# Patient Record
Sex: FEMALE | Race: WHITE | NOT HISPANIC OR LATINO
[De-identification: names, ages, dates, MRNs, and addresses within clinical notes are randomized per-mention and may not be internally consistent; named-entity substitution may affect disease eponyms.]

---

## 2017-01-09 ENCOUNTER — APPOINTMENT (OUTPATIENT)
Dept: DERMATOLOGY | Facility: CLINIC | Age: 32
End: 2017-01-09

## 2017-02-27 ENCOUNTER — APPOINTMENT (OUTPATIENT)
Dept: DERMATOLOGY | Facility: CLINIC | Age: 32
End: 2017-02-27

## 2017-04-24 ENCOUNTER — APPOINTMENT (OUTPATIENT)
Dept: DERMATOLOGY | Facility: CLINIC | Age: 32
End: 2017-04-24

## 2017-04-24 VITALS
DIASTOLIC BLOOD PRESSURE: 78 MMHG | HEIGHT: 60 IN | WEIGHT: 25 LBS | SYSTOLIC BLOOD PRESSURE: 110 MMHG | BODY MASS INDEX: 4.91 KG/M2

## 2017-04-24 DIAGNOSIS — L70.0 ACNE VULGARIS: ICD-10-CM

## 2017-04-24 DIAGNOSIS — L81.4 OTHER MELANIN HYPERPIGMENTATION: ICD-10-CM

## 2017-08-25 ENCOUNTER — APPOINTMENT (OUTPATIENT)
Dept: DERMATOLOGY | Facility: CLINIC | Age: 32
End: 2017-08-25
Payer: COMMERCIAL

## 2017-08-25 VITALS — SYSTOLIC BLOOD PRESSURE: 108 MMHG | DIASTOLIC BLOOD PRESSURE: 64 MMHG

## 2017-08-25 DIAGNOSIS — L73.9 FOLLICULAR DISORDER, UNSPECIFIED: ICD-10-CM

## 2017-08-25 PROCEDURE — 99214 OFFICE O/P EST MOD 30 MIN: CPT

## 2017-11-08 ENCOUNTER — OTHER (OUTPATIENT)
Age: 32
End: 2017-11-08

## 2017-11-08 RX ORDER — METHYLPREDNISOLONE 4 MG/1
4 TABLET ORAL
Qty: 2 | Refills: 0 | Status: COMPLETED | COMMUNITY
Start: 2017-11-08 | End: 2017-11-08

## 2017-11-09 ENCOUNTER — TRANSCRIPTION ENCOUNTER (OUTPATIENT)
Age: 32
End: 2017-11-09

## 2017-12-07 ENCOUNTER — LABORATORY RESULT (OUTPATIENT)
Age: 32
End: 2017-12-07

## 2017-12-07 ENCOUNTER — APPOINTMENT (OUTPATIENT)
Dept: RHEUMATOLOGY | Facility: CLINIC | Age: 32
End: 2017-12-07
Payer: COMMERCIAL

## 2017-12-07 VITALS — OXYGEN SATURATION: 99 % | SYSTOLIC BLOOD PRESSURE: 127 MMHG | HEART RATE: 79 BPM | DIASTOLIC BLOOD PRESSURE: 81 MMHG

## 2017-12-07 DIAGNOSIS — D84.9 IMMUNODEFICIENCY, UNSPECIFIED: ICD-10-CM

## 2017-12-07 DIAGNOSIS — L30.9 DERMATITIS, UNSPECIFIED: ICD-10-CM

## 2017-12-07 PROCEDURE — 99205 OFFICE O/P NEW HI 60 MIN: CPT | Mod: 25

## 2017-12-07 PROCEDURE — 36415 COLL VENOUS BLD VENIPUNCTURE: CPT

## 2017-12-08 PROBLEM — D84.9 IMMUNODEFICIENCY: Status: ACTIVE | Noted: 2017-12-07

## 2017-12-11 LAB
ALBUMIN SERPL ELPH-MCNC: 4.8 G/DL
ALP BLD-CCNC: 41 U/L
ALT SERPL-CCNC: 28 U/L
ANION GAP SERPL CALC-SCNC: 24 MMOL/L
AST SERPL-CCNC: 25 U/L
BAKER'S YEAST AB QL: 44 UNITS
BAKER'S YEAST IGA QL IA: 21.9 UNITS
BAKER'S YEAST IGA QN IA: ABNORMAL
BAKER'S YEAST IGG QN IA: POSITIVE
BASOPHILS # BLD AUTO: 0.22 K/UL
BASOPHILS NFR BLD AUTO: 2.6 %
BILIRUB SERPL-MCNC: 0.3 MG/DL
BUN SERPL-MCNC: 14 MG/DL
C3 SERPL-MCNC: 98 MG/DL
C4 SERPL-MCNC: 20 MG/DL
CALCIUM SERPL-MCNC: 10.2 MG/DL
CHLORIDE SERPL-SCNC: 102 MMOL/L
CO2 SERPL-SCNC: 19 MMOL/L
CREAT SERPL-MCNC: 0.81 MG/DL
CRP SERPL-MCNC: <0.2 MG/DL
ENDOMYSIUM IGA SER QL: NEGATIVE
ENDOMYSIUM IGA TITR SER: NORMAL
EOSINOPHIL # BLD AUTO: 0.08 K/UL
EOSINOPHIL # BLD MANUAL: 190 /UL
EOSINOPHIL NFR BLD AUTO: 0.9
ERYTHROCYTE [SEDIMENTATION RATE] IN BLOOD BY WESTERGREN METHOD: 6 MM/HR
GLUCOSE SERPL-MCNC: 90 MG/DL
HCT VFR BLD CALC: 42.7 %
HGB BLD-MCNC: 14.2 G/DL
IGA SER QL IEP: 143 MG/DL
IGG SER QL IEP: 609 MG/DL
IGM SER QL IEP: 77 MG/DL
LYMPHOCYTES # BLD AUTO: 2.49 K/UL
LYMPHOCYTES NFR BLD AUTO: 28.9 %
MAN DIFF?: NORMAL
MCHC RBC-ENTMCNC: 29.6 PG
MCHC RBC-ENTMCNC: 33.3 GM/DL
MCV RBC AUTO: 89.1 FL
MONOCYTES # BLD AUTO: 0.6 K/UL
MONOCYTES NFR BLD AUTO: 7 %
NEUTROPHILS # BLD AUTO: 4.69 K/UL
NEUTROPHILS NFR BLD AUTO: 52.7 %
PLATELET # BLD AUTO: 262 K/UL
POTASSIUM SERPL-SCNC: 4.6 MMOL/L
PROT SERPL-MCNC: 7.1 G/DL
RBC # BLD: 4.79 M/UL
RBC # FLD: 13.7 %
RHEUMATOID FACT SER QL: 13 IU/ML
SODIUM SERPL-SCNC: 145 MMOL/L
TTG IGA SER IA-ACNC: <5 UNITS
TTG IGA SER-ACNC: NEGATIVE
WBC # FLD AUTO: 8.6 K/UL

## 2017-12-12 LAB — CH50 SERPL-MCNC: 50 U/ML

## 2017-12-14 LAB — ANTI IGA ANTIBODIES: NORMAL

## 2017-12-29 LAB
IGE RECEPTOR AB: NORMAL
IGG4 SER-MCNC: 10 MG/DL

## 2018-02-07 ENCOUNTER — APPOINTMENT (OUTPATIENT)
Dept: OPHTHALMOLOGY | Facility: CLINIC | Age: 33
End: 2018-02-07
Payer: COMMERCIAL

## 2018-02-07 DIAGNOSIS — D31.41 BENIGN NEOPLASM OF RIGHT CILIARY BODY: ICD-10-CM

## 2018-02-07 DIAGNOSIS — H52.12 MYOPIA, LEFT EYE: ICD-10-CM

## 2018-02-07 DIAGNOSIS — H52.11 MYOPIA, RIGHT EYE: ICD-10-CM

## 2018-02-07 DIAGNOSIS — D31.42 BENIGN NEOPLASM OF LEFT CILIARY BODY: ICD-10-CM

## 2018-02-07 PROCEDURE — 92014 COMPRE OPH EXAM EST PT 1/>: CPT

## 2018-02-20 ENCOUNTER — RX RENEWAL (OUTPATIENT)
Age: 33
End: 2018-02-20

## 2018-02-20 ENCOUNTER — APPOINTMENT (OUTPATIENT)
Dept: OTOLARYNGOLOGY | Facility: CLINIC | Age: 33
End: 2018-02-20
Payer: COMMERCIAL

## 2018-02-20 VITALS
BODY MASS INDEX: 20.49 KG/M2 | SYSTOLIC BLOOD PRESSURE: 105 MMHG | DIASTOLIC BLOOD PRESSURE: 68 MMHG | HEIGHT: 64 IN | WEIGHT: 120 LBS | TEMPERATURE: 98.7 F | HEART RATE: 71 BPM

## 2018-02-20 DIAGNOSIS — R04.0 EPISTAXIS: ICD-10-CM

## 2018-02-20 PROCEDURE — 31231 NASAL ENDOSCOPY DX: CPT

## 2018-02-20 PROCEDURE — 99214 OFFICE O/P EST MOD 30 MIN: CPT | Mod: 25

## 2018-02-20 RX ORDER — CLINDAMYCIN PHOSPHATE 10 MG/ML
1 LOTION TOPICAL TWICE DAILY
Qty: 1 | Refills: 3 | Status: COMPLETED | COMMUNITY
Start: 2017-08-25 | End: 2018-02-20

## 2018-02-20 RX ORDER — SPIRONOLACTONE 50 MG/1
50 TABLET ORAL
Qty: 60 | Refills: 3 | Status: COMPLETED | COMMUNITY
Start: 2017-04-24 | End: 2018-02-20

## 2018-02-20 RX ORDER — TRIAMCINOLONE ACETONIDE 1 MG/G
0.1 OINTMENT TOPICAL
Qty: 1 | Refills: 1 | Status: COMPLETED | COMMUNITY
Start: 2017-08-25 | End: 2018-02-20

## 2018-02-20 RX ORDER — METHYLPREDNISOLONE 4 MG/1
4 TABLET ORAL
Qty: 2 | Refills: 0 | Status: COMPLETED | COMMUNITY
Start: 2017-11-08 | End: 2018-02-20

## 2018-02-26 ENCOUNTER — APPOINTMENT (OUTPATIENT)
Dept: OTOLARYNGOLOGY | Facility: CLINIC | Age: 33
End: 2018-02-26

## 2018-03-11 ENCOUNTER — RESULT REVIEW (OUTPATIENT)
Age: 33
End: 2018-03-11

## 2018-03-18 ENCOUNTER — TRANSCRIPTION ENCOUNTER (OUTPATIENT)
Age: 33
End: 2018-03-18

## 2018-04-17 ENCOUNTER — LABORATORY RESULT (OUTPATIENT)
Age: 33
End: 2018-04-17

## 2018-04-17 ENCOUNTER — APPOINTMENT (OUTPATIENT)
Dept: INFECTIOUS DISEASE | Facility: CLINIC | Age: 33
End: 2018-04-17
Payer: COMMERCIAL

## 2018-04-17 VITALS
DIASTOLIC BLOOD PRESSURE: 74 MMHG | TEMPERATURE: 98.2 F | OXYGEN SATURATION: 98 % | SYSTOLIC BLOOD PRESSURE: 112 MMHG | BODY MASS INDEX: 21.34 KG/M2 | RESPIRATION RATE: 16 BRPM | HEART RATE: 73 BPM | WEIGHT: 125 LBS | HEIGHT: 64 IN

## 2018-04-17 DIAGNOSIS — R89.4 ABNORMAL IMMUNOLOGICAL FINDINGS IN SPECIMENS FROM OTHER ORGANS, SYSTEMS AND TISSUES: ICD-10-CM

## 2018-04-17 DIAGNOSIS — Z87.09 PERSONAL HISTORY OF OTHER DISEASES OF THE RESPIRATORY SYSTEM: ICD-10-CM

## 2018-04-17 DIAGNOSIS — B00.1 HERPESVIRAL VESICULAR DERMATITIS: ICD-10-CM

## 2018-04-17 PROCEDURE — 36415 COLL VENOUS BLD VENIPUNCTURE: CPT

## 2018-04-17 PROCEDURE — 99204 OFFICE O/P NEW MOD 45 MIN: CPT | Mod: 25

## 2018-04-17 RX ORDER — AMOXICILLIN 500 MG/1
500 TABLET, FILM COATED ORAL
Qty: 14 | Refills: 0 | Status: COMPLETED | COMMUNITY
Start: 2018-02-20 | End: 2018-04-17

## 2018-04-17 RX ORDER — FLUCONAZOLE 150 MG/1
150 TABLET ORAL
Qty: 13 | Refills: 2 | Status: COMPLETED | COMMUNITY
Start: 2017-08-24 | End: 2018-04-17

## 2018-04-17 RX ORDER — FLUCONAZOLE 150 MG/1
150 TABLET ORAL
Qty: 2 | Refills: 0 | Status: COMPLETED | COMMUNITY
Start: 2018-02-20 | End: 2018-04-17

## 2018-04-17 RX ORDER — VALACYCLOVIR HYDROCHLORIDE 1 G/1
1 TABLET, FILM COATED ORAL DAILY
Refills: 0 | Status: DISCONTINUED | COMMUNITY
End: 2018-04-17

## 2018-04-17 RX ORDER — PREDNISONE 10 MG/1
10 TABLET ORAL
Qty: 25 | Refills: 0 | Status: COMPLETED | COMMUNITY
Start: 2018-02-20 | End: 2018-04-17

## 2018-04-18 LAB
EBV EA AB SER IA-ACNC: <5 U/ML
EBV EA AB TITR SER IF: POSITIVE
EBV EA IGG SER QL IA: 484 U/ML
EBV EA IGG SER-ACNC: NEGATIVE
EBV EA IGM SER IA-ACNC: NEGATIVE
EBV PATRN SPEC IB-IMP: NORMAL
EBV VCA IGG SER IA-ACNC: 120 U/ML
EBV VCA IGM SER QL IA: <10 U/ML
EPSTEIN-BARR VIRUS CAPSID ANTIGEN IGG: POSITIVE

## 2018-04-20 ENCOUNTER — MOBILE ON CALL (OUTPATIENT)
Age: 33
End: 2018-04-20

## 2018-04-22 ENCOUNTER — MOBILE ON CALL (OUTPATIENT)
Age: 33
End: 2018-04-22

## 2018-08-29 ENCOUNTER — TRANSCRIPTION ENCOUNTER (OUTPATIENT)
Age: 33
End: 2018-08-29

## 2018-11-06 ENCOUNTER — RX RENEWAL (OUTPATIENT)
Age: 33
End: 2018-11-06

## 2018-11-06 RX ORDER — RANITIDINE HCL 150 MG
CAPSULE ORAL
Refills: 0 | Status: DISCONTINUED | COMMUNITY
End: 2018-11-06

## 2018-11-06 RX ORDER — FAMCICLOVIR 500 MG/1
500 TABLET, FILM COATED ORAL ONCE
Qty: 30 | Refills: 0 | Status: DISCONTINUED | COMMUNITY
Start: 2018-04-17 | End: 2018-11-06

## 2018-11-06 RX ORDER — MONTELUKAST SODIUM 10 MG/1
TABLET, FILM COATED ORAL
Refills: 0 | Status: DISCONTINUED | COMMUNITY
End: 2018-11-06

## 2019-01-17 ENCOUNTER — EMERGENCY (EMERGENCY)
Facility: HOSPITAL | Age: 34
LOS: 0 days | Discharge: HOME | End: 2019-01-18
Attending: EMERGENCY MEDICINE
Payer: MEDICARE

## 2019-01-17 VITALS
SYSTOLIC BLOOD PRESSURE: 121 MMHG | TEMPERATURE: 103 F | OXYGEN SATURATION: 99 % | HEIGHT: 64 IN | RESPIRATION RATE: 24 BRPM | WEIGHT: 125 LBS | HEART RATE: 120 BPM | DIASTOLIC BLOOD PRESSURE: 77 MMHG

## 2019-01-17 LAB
ALBUMIN SERPL ELPH-MCNC: 4.1 G/DL — SIGNIFICANT CHANGE UP (ref 3.3–5)
ALP SERPL-CCNC: 38 U/L — LOW (ref 40–120)
ALT FLD-CCNC: 16 U/L — SIGNIFICANT CHANGE UP (ref 12–78)
ANION GAP SERPL CALC-SCNC: 13 MMOL/L — SIGNIFICANT CHANGE UP (ref 5–17)
APPEARANCE UR: CLEAR — SIGNIFICANT CHANGE UP
APTT BLD: 27.8 SEC — SIGNIFICANT CHANGE UP (ref 27.5–36.3)
AST SERPL-CCNC: 16 U/L — SIGNIFICANT CHANGE UP (ref 15–37)
BASOPHILS # BLD AUTO: 0.05 K/UL — SIGNIFICANT CHANGE UP (ref 0–0.2)
BASOPHILS NFR BLD AUTO: 0.6 % — SIGNIFICANT CHANGE UP (ref 0–2)
BILIRUB SERPL-MCNC: 0.4 MG/DL — SIGNIFICANT CHANGE UP (ref 0.2–1.2)
BILIRUB UR-MCNC: NEGATIVE — SIGNIFICANT CHANGE UP
BUN SERPL-MCNC: 8 MG/DL — SIGNIFICANT CHANGE UP (ref 7–23)
CALCIUM SERPL-MCNC: 8.8 MG/DL — SIGNIFICANT CHANGE UP (ref 8.5–10.1)
CHLORIDE SERPL-SCNC: 105 MMOL/L — SIGNIFICANT CHANGE UP (ref 96–108)
CO2 SERPL-SCNC: 19 MMOL/L — LOW (ref 22–31)
COLOR SPEC: YELLOW — SIGNIFICANT CHANGE UP
CREAT SERPL-MCNC: 0.9 MG/DL — SIGNIFICANT CHANGE UP (ref 0.5–1.3)
DIFF PNL FLD: ABNORMAL
EOSINOPHIL # BLD AUTO: 0 K/UL — SIGNIFICANT CHANGE UP (ref 0–0.5)
EOSINOPHIL NFR BLD AUTO: 0 % — SIGNIFICANT CHANGE UP (ref 0–6)
GLUCOSE SERPL-MCNC: 111 MG/DL — HIGH (ref 70–99)
GLUCOSE UR QL: NEGATIVE MG/DL — SIGNIFICANT CHANGE UP
HCG SERPL-ACNC: <1 MIU/ML — SIGNIFICANT CHANGE UP
HCT VFR BLD CALC: 43.8 % — SIGNIFICANT CHANGE UP (ref 34.5–45)
HGB BLD-MCNC: 15 G/DL — SIGNIFICANT CHANGE UP (ref 11.5–15.5)
IMM GRANULOCYTES NFR BLD AUTO: 0.2 % — SIGNIFICANT CHANGE UP (ref 0–1.5)
INR BLD: 1.17 RATIO — HIGH (ref 0.88–1.16)
KETONES UR-MCNC: ABNORMAL
LACTATE SERPL-SCNC: 2 MMOL/L — SIGNIFICANT CHANGE UP (ref 0.7–2)
LEUKOCYTE ESTERASE UR-ACNC: ABNORMAL
LIDOCAIN IGE QN: 110 U/L — SIGNIFICANT CHANGE UP (ref 73–393)
LYMPHOCYTES # BLD AUTO: 0.52 K/UL — LOW (ref 1–3.3)
LYMPHOCYTES # BLD AUTO: 5.8 % — LOW (ref 13–44)
MCHC RBC-ENTMCNC: 28.8 PG — SIGNIFICANT CHANGE UP (ref 27–34)
MCHC RBC-ENTMCNC: 34.2 GM/DL — SIGNIFICANT CHANGE UP (ref 32–36)
MCV RBC AUTO: 84.1 FL — SIGNIFICANT CHANGE UP (ref 80–100)
MONOCYTES # BLD AUTO: 0.73 K/UL — SIGNIFICANT CHANGE UP (ref 0–0.9)
MONOCYTES NFR BLD AUTO: 8.1 % — SIGNIFICANT CHANGE UP (ref 2–14)
NEUTROPHILS # BLD AUTO: 7.68 K/UL — HIGH (ref 1.8–7.4)
NEUTROPHILS NFR BLD AUTO: 85.3 % — HIGH (ref 43–77)
NITRITE UR-MCNC: NEGATIVE — SIGNIFICANT CHANGE UP
NRBC # BLD: 0 /100 WBCS — SIGNIFICANT CHANGE UP (ref 0–0)
PH UR: 6 — SIGNIFICANT CHANGE UP (ref 5–8)
PLATELET # BLD AUTO: 228 K/UL — SIGNIFICANT CHANGE UP (ref 150–400)
POTASSIUM SERPL-MCNC: 3.4 MMOL/L — LOW (ref 3.5–5.3)
POTASSIUM SERPL-SCNC: 3.4 MMOL/L — LOW (ref 3.5–5.3)
PROT SERPL-MCNC: 7.3 GM/DL — SIGNIFICANT CHANGE UP (ref 6–8.3)
PROT UR-MCNC: NEGATIVE MG/DL — SIGNIFICANT CHANGE UP
PROTHROM AB SERPL-ACNC: 13.2 SEC — HIGH (ref 10–12.9)
RBC # BLD: 5.21 M/UL — HIGH (ref 3.8–5.2)
RBC # FLD: 12.1 % — SIGNIFICANT CHANGE UP (ref 10.3–14.5)
SODIUM SERPL-SCNC: 137 MMOL/L — SIGNIFICANT CHANGE UP (ref 135–145)
SP GR SPEC: 1.01 — SIGNIFICANT CHANGE UP (ref 1.01–1.02)
UROBILINOGEN FLD QL: NEGATIVE MG/DL — SIGNIFICANT CHANGE UP
WBC # BLD: 9 K/UL — SIGNIFICANT CHANGE UP (ref 3.8–10.5)
WBC # FLD AUTO: 9 K/UL — SIGNIFICANT CHANGE UP (ref 3.8–10.5)

## 2019-01-17 PROCEDURE — 71045 X-RAY EXAM CHEST 1 VIEW: CPT | Mod: 26

## 2019-01-17 PROCEDURE — 99284 EMERGENCY DEPT VISIT MOD MDM: CPT | Mod: 25

## 2019-01-17 RX ORDER — ACETAMINOPHEN 500 MG
650 TABLET ORAL ONCE
Qty: 0 | Refills: 0 | Status: COMPLETED | OUTPATIENT
Start: 2019-01-17 | End: 2019-01-17

## 2019-01-17 RX ORDER — AZITHROMYCIN 500 MG/1
500 TABLET, FILM COATED ORAL ONCE
Qty: 0 | Refills: 0 | Status: COMPLETED | OUTPATIENT
Start: 2019-01-17 | End: 2019-01-17

## 2019-01-17 RX ORDER — SODIUM CHLORIDE 9 MG/ML
1750 INJECTION INTRAMUSCULAR; INTRAVENOUS; SUBCUTANEOUS ONCE
Qty: 0 | Refills: 0 | Status: COMPLETED | OUTPATIENT
Start: 2019-01-17 | End: 2019-01-17

## 2019-01-17 RX ADMIN — AZITHROMYCIN 500 MILLIGRAM(S): 500 TABLET, FILM COATED ORAL at 23:55

## 2019-01-17 RX ADMIN — SODIUM CHLORIDE 1750 MILLILITER(S): 9 INJECTION INTRAMUSCULAR; INTRAVENOUS; SUBCUTANEOUS at 23:33

## 2019-01-17 RX ADMIN — Medication 650 MILLIGRAM(S): at 23:27

## 2019-01-17 RX ADMIN — SODIUM CHLORIDE 1750 MILLILITER(S): 9 INJECTION INTRAMUSCULAR; INTRAVENOUS; SUBCUTANEOUS at 22:33

## 2019-01-17 RX ADMIN — AZITHROMYCIN 255 MILLIGRAM(S): 500 TABLET, FILM COATED ORAL at 22:50

## 2019-01-17 NOTE — ED ADULT NURSE NOTE - PMH
Follow up call placed to patient's mother and a voicemail was left instructing her to call back.    Malignant melanoma, unspecified site  hx melanoma on left chest

## 2019-01-17 NOTE — ED ADULT NURSE NOTE - OBJECTIVE STATEMENT
Pt explains, [place].  While on her flight she was vomiting, coughing, had chills. Pt explains, she was hiking in the mountains yesterday.  She began to feel a fever.  While on her flight today, pt began vomiting, coughing, and had chills.   Pt on cardiac monitor, tachycardic, shaking, tachypneic.

## 2019-01-17 NOTE — ED ADULT TRIAGE NOTE - CHIEF COMPLAINT QUOTE
c/o fever/chills since yesterday c/o cough unproductive c/o difficulty breathing just got off 6 hour airplane flight c/o chest tightness

## 2019-01-17 NOTE — ED ADULT NURSE NOTE - NSIMPLEMENTINTERV_GEN_ALL_ED
Implemented All Universal Safety Interventions:  [place] to call system. Call bell, personal items and telephone within reach. Instruct patient to call for assistance. Room bathroom lighting operational. Non-slip footwear when patient is off stretcher. Physically safe environment: no spills, clutter or unnecessary equipment. Stretcher in lowest position, wheels locked, appropriate side rails in place.

## 2019-01-18 VITALS
TEMPERATURE: 99 F | OXYGEN SATURATION: 96 % | RESPIRATION RATE: 16 BRPM | DIASTOLIC BLOOD PRESSURE: 57 MMHG | SYSTOLIC BLOOD PRESSURE: 99 MMHG | HEART RATE: 88 BPM

## 2019-01-18 DIAGNOSIS — Z90.89 ACQUIRED ABSENCE OF OTHER ORGANS: Chronic | ICD-10-CM

## 2019-01-18 DIAGNOSIS — C43.9 MALIGNANT MELANOMA OF SKIN, UNSPECIFIED: Chronic | ICD-10-CM

## 2019-01-18 LAB
BACTERIA # UR AUTO: ABNORMAL
EPI CELLS # UR: ABNORMAL
WBC UR QL: ABNORMAL

## 2019-01-18 PROCEDURE — 71275 CT ANGIOGRAPHY CHEST: CPT | Mod: 26

## 2019-01-18 RX ORDER — FLUTICASONE PROPIONATE 50 MCG
2 SPRAY, SUSPENSION NASAL ONCE
Qty: 0 | Refills: 0 | Status: COMPLETED | OUTPATIENT
Start: 2019-01-18 | End: 2019-01-18

## 2019-01-18 RX ORDER — CEFTRIAXONE 500 MG/1
1 INJECTION, POWDER, FOR SOLUTION INTRAMUSCULAR; INTRAVENOUS ONCE
Qty: 0 | Refills: 0 | Status: COMPLETED | OUTPATIENT
Start: 2019-01-18 | End: 2019-01-18

## 2019-01-18 RX ORDER — KETOROLAC TROMETHAMINE 30 MG/ML
15 SYRINGE (ML) INJECTION ONCE
Qty: 0 | Refills: 0 | Status: DISCONTINUED | OUTPATIENT
Start: 2019-01-18 | End: 2019-01-18

## 2019-01-18 RX ADMIN — CEFTRIAXONE 100 GRAM(S): 500 INJECTION, POWDER, FOR SOLUTION INTRAMUSCULAR; INTRAVENOUS at 01:41

## 2019-01-18 RX ADMIN — Medication 15 MILLIGRAM(S): at 02:04

## 2019-01-18 RX ADMIN — CEFTRIAXONE 1 GRAM(S): 500 INJECTION, POWDER, FOR SOLUTION INTRAMUSCULAR; INTRAVENOUS at 02:11

## 2019-01-18 RX ADMIN — Medication 15 MILLIGRAM(S): at 01:43

## 2019-01-18 RX ADMIN — Medication 650 MILLIGRAM(S): at 00:27

## 2019-01-18 RX ADMIN — Medication 2 SPRAY(S): at 02:10

## 2019-01-18 NOTE — ED PROVIDER NOTE - ENMT, MLM
Airway patent, nasal congestion Mouth with normal mucosa. Throat has no vesicles, no oropharyngeal exudates and uvula is midline.

## 2019-01-18 NOTE — ED PROVIDER NOTE - MEDICAL DECISION MAKING DETAILS
labs and imaging reviewed. patient received ivfs, abx, toradol, tylenol. she currently feels well and wants to go home. patient discharged with care instructions. she will follow up with pmd. Discussed results and outcome of testing with the patient.  Patient advised to please follow up with their primary care doctor within the next 24 hours and return to the Emergency Department for worsening symptoms or any other concerns.  Patient advised that their doctor may call  to follow up on the specific results of the tests performed today in the emergency department.

## 2019-01-18 NOTE — ED PROVIDER NOTE - OBJECTIVE STATEMENT
Pertinent PMH/PSH/FHx/SHx and Review of Systems contained within:  32 yo f with pmh of chronic sinusitis presents in ED c/o nbnb vomitus associated with nasal congestion, sob and fever. Patient just returned from [place] where she was hiking and got worse on the plane. No fever/chills, No photophobia/eye pain/changes in vision, No ear pain/sore throat/dysphagia, No chest pain/palpitations, no wheeze/stridor, No abdominal pain, No D, no dysuria/frequency/discharge, No neck/back pain, no rash, no changes in neurological status/function.

## 2019-01-18 NOTE — ED PROVIDER NOTE - GASTROINTESTINAL, MLM
Addended by: MENDOZA ESTEVEZ on: 3/20/2018 04:20 PM     Modules accepted: Orders    
Abdomen soft, non-tender, no guarding.

## 2019-01-19 ENCOUNTER — TRANSCRIPTION ENCOUNTER (OUTPATIENT)
Age: 34
End: 2019-01-19

## 2019-01-19 DIAGNOSIS — R06.89 OTHER ABNORMALITIES OF BREATHING: ICD-10-CM

## 2019-01-19 DIAGNOSIS — Z88.5 ALLERGY STATUS TO NARCOTIC AGENT: ICD-10-CM

## 2019-01-19 DIAGNOSIS — R11.2 NAUSEA WITH VOMITING, UNSPECIFIED: ICD-10-CM

## 2019-01-19 DIAGNOSIS — J01.91 ACUTE RECURRENT SINUSITIS, UNSPECIFIED: ICD-10-CM

## 2019-01-19 LAB
CULTURE RESULTS: NO GROWTH — SIGNIFICANT CHANGE UP
SPECIMEN SOURCE: SIGNIFICANT CHANGE UP

## 2019-01-23 LAB
CULTURE RESULTS: SIGNIFICANT CHANGE UP
CULTURE RESULTS: SIGNIFICANT CHANGE UP
SPECIMEN SOURCE: SIGNIFICANT CHANGE UP
SPECIMEN SOURCE: SIGNIFICANT CHANGE UP

## 2019-02-08 PROBLEM — C43.9 MALIGNANT MELANOMA OF SKIN, UNSPECIFIED: Chronic | Status: ACTIVE | Noted: 2019-01-18

## 2019-02-19 ENCOUNTER — APPOINTMENT (OUTPATIENT)
Dept: FAMILY MEDICINE | Facility: CLINIC | Age: 34
End: 2019-02-19
Payer: COMMERCIAL

## 2019-02-19 VITALS
TEMPERATURE: 98.1 F | DIASTOLIC BLOOD PRESSURE: 70 MMHG | HEART RATE: 84 BPM | HEIGHT: 61 IN | BODY MASS INDEX: 23.6 KG/M2 | OXYGEN SATURATION: 99 % | RESPIRATION RATE: 14 BRPM | WEIGHT: 125 LBS | SYSTOLIC BLOOD PRESSURE: 100 MMHG

## 2019-02-19 PROCEDURE — 99385 PREV VISIT NEW AGE 18-39: CPT

## 2019-02-19 RX ORDER — CLINDAMYCIN PHOSPHATE 10 MG/ML
1 LOTION TOPICAL TWICE DAILY
Qty: 1 | Refills: 3 | Status: DISCONTINUED | COMMUNITY
Start: 2018-11-06 | End: 2019-02-19

## 2019-02-19 RX ORDER — CETIRIZINE HCL 10 MG
10 TABLET ORAL
Refills: 0 | Status: DISCONTINUED | COMMUNITY
End: 2019-02-19

## 2019-02-19 NOTE — PHYSICAL EXAM

## 2019-02-19 NOTE — HISTORY OF PRESENT ILLNESS
[de-identified] : 33 y.o. F with PMHx of melanoma s/p excision here as new pt to establish care. Pt has not seen a PCP in several years. A month ago was diagnosed and treated for flu. Felt better but then developed dry cough which is responding to mucinex. 2 days ago had body aches, subjective fevers, vomiting, and diarrhea which is also now improving. Pt has noticed she is getting sick a lot, having difficult to treat rashes on her face and in her ears, had an ear infxn with positive staph culture, recurrent yeast infections. Was evaluated by allergist/immunologist, infectious disease, and rheumatology. 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 Her total IgG is also low. \par

## 2019-02-20 ENCOUNTER — APPOINTMENT (OUTPATIENT)
Dept: PLASTIC SURGERY | Facility: CLINIC | Age: 34
End: 2019-02-20
Payer: COMMERCIAL

## 2019-02-20 VITALS — WEIGHT: 125 LBS | HEIGHT: 64 IN | BODY MASS INDEX: 21.34 KG/M2

## 2019-02-20 LAB
25(OH)D3 SERPL-MCNC: 23.6 NG/ML
ALBUMIN SERPL ELPH-MCNC: 4.6 G/DL
ALP BLD-CCNC: 38 U/L
ALT SERPL-CCNC: 14 U/L
ANION GAP SERPL CALC-SCNC: 20 MMOL/L
AST SERPL-CCNC: 15 U/L
BASOPHILS # BLD AUTO: 0.03 K/UL
BASOPHILS NFR BLD AUTO: 0.5 %
BILIRUB SERPL-MCNC: 0.2 MG/DL
BUN SERPL-MCNC: 15 MG/DL
CALCIUM SERPL-MCNC: 9.6 MG/DL
CHLORIDE SERPL-SCNC: 106 MMOL/L
CHOLEST SERPL-MCNC: 158 MG/DL
CHOLEST/HDLC SERPL: 2.3 RATIO
CO2 SERPL-SCNC: 19 MMOL/L
CREAT SERPL-MCNC: 0.81 MG/DL
EOSINOPHIL # BLD AUTO: 0.14 K/UL
EOSINOPHIL NFR BLD AUTO: 2.2 %
FOLATE SERPL-MCNC: 10.4 NG/ML
GLUCOSE SERPL-MCNC: 85 MG/DL
HBA1C MFR BLD HPLC: 5 %
HCT VFR BLD CALC: 42.9 %
HDLC SERPL-MCNC: 68 MG/DL
HGB BLD-MCNC: 14.2 G/DL
IMM GRANULOCYTES NFR BLD AUTO: 0.3 %
LDLC SERPL CALC-MCNC: 77 MG/DL
LYMPHOCYTES # BLD AUTO: 1.86 K/UL
LYMPHOCYTES NFR BLD AUTO: 29.1 %
MAN DIFF?: NORMAL
MCHC RBC-ENTMCNC: 29.2 PG
MCHC RBC-ENTMCNC: 33.1 GM/DL
MCV RBC AUTO: 88.3 FL
MONOCYTES # BLD AUTO: 0.94 K/UL
MONOCYTES NFR BLD AUTO: 14.7 %
NEUTROPHILS # BLD AUTO: 3.41 K/UL
NEUTROPHILS NFR BLD AUTO: 53.2 %
PLATELET # BLD AUTO: 242 K/UL
POTASSIUM SERPL-SCNC: 4.1 MMOL/L
PROT SERPL-MCNC: 6.8 G/DL
RBC # BLD: 4.86 M/UL
RBC # FLD: 12.7 %
SODIUM SERPL-SCNC: 145 MMOL/L
T3FREE SERPL-MCNC: 2.54 PG/ML
T4 FREE SERPL-MCNC: 1.2 NG/DL
TRIGL SERPL-MCNC: 65 MG/DL
TSH SERPL-ACNC: 1.18 UIU/ML
VIT B12 SERPL-MCNC: 645 PG/ML
WBC # FLD AUTO: 6.4 K/UL

## 2019-02-20 PROCEDURE — 99203 OFFICE O/P NEW LOW 30 MIN: CPT

## 2019-02-23 NOTE — REVIEW OF SYSTEMS
[Negative] : Musculoskeletal [de-identified] : hx of melanoma chest +vertical scar, implants in place

## 2019-02-23 NOTE — REASON FOR VISIT
[Initial Evaluation] : an initial evaluation [FreeTextEntry1] : Pt presents here for Consultation of Possible Removal of Bilateral Breast Implants. Pt states Implants were placed in 2008 following Excision of Melanoma on Chest in 2007. Pt states she is feeling tired, unfocused, experiencing recurring yeast infections, random body rashes, joint inflammation, night sweats, and severe pain.

## 2019-02-23 NOTE — PHYSICAL EXAM
[NI] : Normal [de-identified] : Implants soft bilaterally +vertical scar left chest [de-identified] : some rashes

## 2019-02-23 NOTE — HISTORY OF PRESENT ILLNESS
[FreeTextEntry1] : Pt here to discuss removal of bilateral breast implants.  Pt states the implants were placed in 2008 following excision of melanoma on the chest in 2007. She has had numerous problems since her implants were placed such as rashes, Ebstein Mcconnell, RF slightly elevated, etc. The patient does not know if the implants have anything to do with this but due to some information that is available feels it may be better to remove them.

## 2019-03-12 ENCOUNTER — APPOINTMENT (OUTPATIENT)
Dept: INTERNAL MEDICINE | Facility: CLINIC | Age: 34
End: 2019-03-12

## 2019-04-26 ENCOUNTER — APPOINTMENT (OUTPATIENT)
Dept: FAMILY MEDICINE | Facility: CLINIC | Age: 34
End: 2019-04-26
Payer: COMMERCIAL

## 2019-04-26 VITALS
SYSTOLIC BLOOD PRESSURE: 110 MMHG | HEIGHT: 64 IN | BODY MASS INDEX: 20.83 KG/M2 | WEIGHT: 122 LBS | RESPIRATION RATE: 14 BRPM | HEART RATE: 115 BPM | DIASTOLIC BLOOD PRESSURE: 62 MMHG | OXYGEN SATURATION: 98 % | TEMPERATURE: 98 F

## 2019-04-26 DIAGNOSIS — Z87.09 PERSONAL HISTORY OF OTHER DISEASES OF THE RESPIRATORY SYSTEM: ICD-10-CM

## 2019-04-26 DIAGNOSIS — B37.3 CANDIDIASIS OF VULVA AND VAGINA: ICD-10-CM

## 2019-04-26 PROCEDURE — 99213 OFFICE O/P EST LOW 20 MIN: CPT

## 2019-04-26 NOTE — HISTORY OF PRESENT ILLNESS
[FreeTextEntry8] : Pt presenting with runny nose, sore throat, PND, cough, fatigue, sinus congestion for 1 week. Pt feels like her nose is running but cannot get anything out when she blows. Has been using neti pot. Cannot use flonase because it gives her a cutaneous reaction around her mouth.

## 2019-04-26 NOTE — PHYSICAL EXAM
[Well Developed] : well developed [Well Nourished] : well nourished [No Acute Distress] : no acute distress [Well-Appearing] : well-appearing [Normal Sclera/Conjunctiva] : normal sclera/conjunctiva [PERRL] : pupils equal round and reactive to light [Normal Oropharynx] : the oropharynx was normal [EOMI] : extraocular movements intact [Normal Outer Ear/Nose] : the outer ears and nose were normal in appearance [Supple] : supple [No JVD] : no jugular venous distention [No Lymphadenopathy] : no lymphadenopathy [Thyroid Normal, No Nodules] : the thyroid was normal and there were no nodules present [No Respiratory Distress] : no respiratory distress  [Clear to Auscultation] : lungs were clear to auscultation bilaterally [No Accessory Muscle Use] : no accessory muscle use [Normal Rate] : normal rate  [Regular Rhythm] : with a regular rhythm [Normal S1, S2] : normal S1 and S2 [No Murmur] : no murmur heard [No Carotid Bruits] : no carotid bruits [No Abdominal Bruit] : a ~M bruit was not heard ~T in the abdomen [Pedal Pulses Present] : the pedal pulses are present [No Varicosities] : no varicosities [No Edema] : there was no peripheral edema [No Extremity Clubbing/Cyanosis] : no extremity clubbing/cyanosis [No Palpable Aorta] : no palpable aorta [Non-distended] : non-distended [Soft] : abdomen soft [Non Tender] : non-tender [No HSM] : no HSM [No Masses] : no abdominal mass palpated [Normal Bowel Sounds] : normal bowel sounds [Normal Anterior Cervical Nodes] : no anterior cervical lymphadenopathy [Normal Posterior Cervical Nodes] : no posterior cervical lymphadenopathy [No CVA Tenderness] : no CVA  tenderness [No Spinal Tenderness] : no spinal tenderness [No Joint Swelling] : no joint swelling [Normal Gait] : normal gait [No Rash] : no rash [Grossly Normal Strength/Tone] : grossly normal strength/tone [No Focal Deficits] : no focal deficits [Coordination Grossly Intact] : coordination grossly intact [Deep Tendon Reflexes (DTR)] : deep tendon reflexes were 2+ and symmetric [Normal Insight/Judgement] : insight and judgment were intact [Normal Affect] : the affect was normal

## 2019-06-03 ENCOUNTER — OUTPATIENT (OUTPATIENT)
Dept: OUTPATIENT SERVICES | Facility: HOSPITAL | Age: 34
LOS: 1 days | End: 2019-06-03
Payer: COMMERCIAL

## 2019-06-03 VITALS
RESPIRATION RATE: 16 BRPM | DIASTOLIC BLOOD PRESSURE: 68 MMHG | WEIGHT: 122.14 LBS | TEMPERATURE: 98 F | OXYGEN SATURATION: 100 % | HEIGHT: 63.5 IN | SYSTOLIC BLOOD PRESSURE: 101 MMHG | HEART RATE: 71 BPM

## 2019-06-03 DIAGNOSIS — T85.9XXA UNSPECIFIED COMPLICATION OF INTERNAL PROSTHETIC DEVICE, IMPLANT AND GRAFT, INITIAL ENCOUNTER: ICD-10-CM

## 2019-06-03 DIAGNOSIS — Z01.818 ENCOUNTER FOR OTHER PREPROCEDURAL EXAMINATION: ICD-10-CM

## 2019-06-03 DIAGNOSIS — C43.9 MALIGNANT MELANOMA OF SKIN, UNSPECIFIED: Chronic | ICD-10-CM

## 2019-06-03 DIAGNOSIS — Z90.89 ACQUIRED ABSENCE OF OTHER ORGANS: Chronic | ICD-10-CM

## 2019-06-03 DIAGNOSIS — Z98.890 OTHER SPECIFIED POSTPROCEDURAL STATES: Chronic | ICD-10-CM

## 2019-06-03 LAB
HCT VFR BLD CALC: 43.8 % — SIGNIFICANT CHANGE UP (ref 34.5–45)
HGB BLD-MCNC: 14.8 G/DL — SIGNIFICANT CHANGE UP (ref 11.5–15.5)
MCHC RBC-ENTMCNC: 29.7 PG — SIGNIFICANT CHANGE UP (ref 27–34)
MCHC RBC-ENTMCNC: 33.8 GM/DL — SIGNIFICANT CHANGE UP (ref 32–36)
MCV RBC AUTO: 88 FL — SIGNIFICANT CHANGE UP (ref 80–100)
NRBC # BLD: 0 /100 WBCS — SIGNIFICANT CHANGE UP (ref 0–0)
PLATELET # BLD AUTO: 254 K/UL — SIGNIFICANT CHANGE UP (ref 150–400)
RBC # BLD: 4.98 M/UL — SIGNIFICANT CHANGE UP (ref 3.8–5.2)
RBC # FLD: 12.6 % — SIGNIFICANT CHANGE UP (ref 10.3–14.5)
WBC # BLD: 8.77 K/UL — SIGNIFICANT CHANGE UP (ref 3.8–10.5)
WBC # FLD AUTO: 8.77 K/UL — SIGNIFICANT CHANGE UP (ref 3.8–10.5)

## 2019-06-03 PROCEDURE — G0463: CPT

## 2019-06-03 PROCEDURE — 36415 COLL VENOUS BLD VENIPUNCTURE: CPT

## 2019-06-03 PROCEDURE — 85027 COMPLETE CBC AUTOMATED: CPT

## 2019-06-03 NOTE — H&P PST ADULT - NSANTHOSAYNRD_GEN_A_CORE
13inches/No. ELIZABETH screening performed.  STOP BANG Legend: 0-2 = LOW Risk; 3-4 = INTERMEDIATE Risk; 5-8 = HIGH Risk

## 2019-06-03 NOTE — H&P PST ADULT - NSICDXPROBLEM_GEN_ALL_CORE_FT
PROBLEM DIAGNOSES  Problem: Unspecified complication of internal prosthetic device, implant and graft, initial encounter  Assessment and Plan: Removal Bilateral Breast Implants (No replacement) scheduled for 6/13/2019 PROBLEM DIAGNOSES  Problem: Unspecified complication of internal prosthetic device, implant and graft, initial encounter  Assessment and Plan: Removal Bilateral Breast Implants (No replacement) scheduled for 6/13/2019  Pre-op instructions given. Pt verbalized understanding  Pepcid given for GI prophylaxis  Chlorhexidine wash instructions given  UCG ordered STAT for day of procedure

## 2019-06-03 NOTE — H&P PST ADULT - NEGATIVE BREAST SYMPTOMS
no breast lump L/no breast lump R/no nipple discharge L/no nipple discharge R/no breast tenderness R/no breast tenderness L

## 2019-06-03 NOTE — H&P PST ADULT - HISTORY OF PRESENT ILLNESS
35yo female denies significant medical history reports 11years ago she had breast implants and now desire to have both removed. Pt presents today for presurgical testing for Removal Bilateral Breast Implants (No replacement) scheduled for 6/13/2019

## 2019-06-03 NOTE — H&P PST ADULT - BP NONINVASIVE DIASTOLIC (MM HG)
SUBJECTIVE:                                                    Alec Baires is a 21 year old male who presents to clinic today for the following health issues:        STD       Duration: recheck     Description (location/character/radiation): penis    Intensity:  mild    Accompanying signs and symptoms: chlamydia recheck      History (similar episodes/previous evaluation): None    Precipitating or alleviating factors:zpack    Therapies tried and outcome: zpack           Problem list and histories reviewed & adjusted, as indicated.  Additional history: as documented        ROS:  C: NEGATIVE for fever, chills, change in weight    OBJECTIVE:                                                    /72  Pulse 56  Temp 97.8  F (36.6  C)  Resp 17  Wt 170 lb (77.1 kg)  SpO2 98%  BMI 24.05 kg/m2  Body mass index is 24.05 kg/(m^2).   GENERAL: healthy, alert, well nourished, well hydrated, no distress         ASSESSMENT/PLAN:                                                    (A56.00) Chlamydial infection of lower genitourinary tract  (primary encounter diagnosis)  Comment: no sx nor GF   Plan: Chlamydia trachomatis PCR        Will recheck since last treatment did not work.  Will call with results. If positive - needs culture . Symptomatic treatment was discussed along when patient should call and/or come back into the clinic or go to ER/Urgent care. All questions answered.         See Patient Instructions    Matt Jimenez MD  [facility]       68

## 2019-06-03 NOTE — H&P PST ADULT - NSICDXPASTSURGICALHX_GEN_ALL_CORE_FT
PAST SURGICAL HISTORY:  History of augmentation of both breasts implants placed 11 years ago    History of tonsillectomy     Melanoma of skin 13 years ago

## 2019-06-13 ENCOUNTER — OUTPATIENT (OUTPATIENT)
Dept: OUTPATIENT SERVICES | Facility: HOSPITAL | Age: 34
LOS: 1 days | End: 2019-06-13
Payer: COMMERCIAL

## 2019-06-13 ENCOUNTER — RESULT REVIEW (OUTPATIENT)
Age: 34
End: 2019-06-13

## 2019-06-13 VITALS
TEMPERATURE: 98 F | OXYGEN SATURATION: 99 % | HEART RATE: 61 BPM | SYSTOLIC BLOOD PRESSURE: 97 MMHG | RESPIRATION RATE: 16 BRPM | DIASTOLIC BLOOD PRESSURE: 61 MMHG

## 2019-06-13 VITALS
DIASTOLIC BLOOD PRESSURE: 68 MMHG | WEIGHT: 122.14 LBS | SYSTOLIC BLOOD PRESSURE: 105 MMHG | RESPIRATION RATE: 18 BRPM | OXYGEN SATURATION: 99 % | HEIGHT: 63.5 IN | TEMPERATURE: 98 F | HEART RATE: 56 BPM

## 2019-06-13 DIAGNOSIS — C43.9 MALIGNANT MELANOMA OF SKIN, UNSPECIFIED: Chronic | ICD-10-CM

## 2019-06-13 DIAGNOSIS — Z98.890 OTHER SPECIFIED POSTPROCEDURAL STATES: Chronic | ICD-10-CM

## 2019-06-13 DIAGNOSIS — T85.9XXA UNSPECIFIED COMPLICATION OF INTERNAL PROSTHETIC DEVICE, IMPLANT AND GRAFT, INITIAL ENCOUNTER: ICD-10-CM

## 2019-06-13 DIAGNOSIS — Z90.89 ACQUIRED ABSENCE OF OTHER ORGANS: Chronic | ICD-10-CM

## 2019-06-13 PROCEDURE — 88305 TISSUE EXAM BY PATHOLOGIST: CPT

## 2019-06-13 PROCEDURE — 19328 RMVL INTACT BREAST IMPLANT: CPT | Mod: 50

## 2019-06-13 PROCEDURE — 88300 SURGICAL PATH GROSS: CPT | Mod: 26,59

## 2019-06-13 PROCEDURE — 19328 RMVL INTACT BREAST IMPLANT: CPT | Mod: LT

## 2019-06-13 PROCEDURE — 88304 TISSUE EXAM BY PATHOLOGIST: CPT

## 2019-06-13 PROCEDURE — 87070 CULTURE OTHR SPECIMN AEROBIC: CPT

## 2019-06-13 PROCEDURE — 88305 TISSUE EXAM BY PATHOLOGIST: CPT | Mod: 26

## 2019-06-13 PROCEDURE — 19101 BIOPSY OF BREAST OPEN: CPT | Mod: 50

## 2019-06-13 PROCEDURE — 87075 CULTR BACTERIA EXCEPT BLOOD: CPT

## 2019-06-13 PROCEDURE — 87102 FUNGUS ISOLATION CULTURE: CPT

## 2019-06-13 PROCEDURE — 88304 TISSUE EXAM BY PATHOLOGIST: CPT | Mod: 26

## 2019-06-13 PROCEDURE — 88300 SURGICAL PATH GROSS: CPT

## 2019-06-13 RX ORDER — ONDANSETRON 8 MG/1
4 TABLET, FILM COATED ORAL ONCE
Refills: 0 | Status: DISCONTINUED | OUTPATIENT
Start: 2019-06-13 | End: 2019-06-13

## 2019-06-13 RX ORDER — SODIUM CHLORIDE 9 MG/ML
1000 INJECTION, SOLUTION INTRAVENOUS
Refills: 0 | Status: DISCONTINUED | OUTPATIENT
Start: 2019-06-13 | End: 2019-06-13

## 2019-06-13 RX ORDER — HYDROMORPHONE HYDROCHLORIDE 2 MG/ML
0.5 INJECTION INTRAMUSCULAR; INTRAVENOUS; SUBCUTANEOUS
Refills: 0 | Status: DISCONTINUED | OUTPATIENT
Start: 2019-06-13 | End: 2019-06-13

## 2019-06-13 RX ORDER — AZTREONAM 2 G
1 VIAL (EA) INJECTION
Qty: 6 | Refills: 0
Start: 2019-06-13 | End: 2019-06-15

## 2019-06-13 RX ORDER — ONDANSETRON 8 MG/1
4 TABLET, FILM COATED ORAL EVERY 6 HOURS
Refills: 0 | Status: DISCONTINUED | OUTPATIENT
Start: 2019-06-13 | End: 2019-06-13

## 2019-06-13 RX ADMIN — SODIUM CHLORIDE 50 MILLILITER(S): 9 INJECTION, SOLUTION INTRAVENOUS at 08:24

## 2019-06-13 NOTE — ASU DISCHARGE PLAN (ADULT/PEDIATRIC) - CARE PROVIDER_API CALL
Vlad Mccarthy)  Plastic Surgery  [address] [place] [address], [address]  [place] [address]  Phone: [phone]  Fax: [phone]  Follow Up Time:

## 2019-06-13 NOTE — BRIEF OPERATIVE NOTE - OPERATION/FINDINGS
Removal of bilateral breast implants.  Bacterial and fungal cultures and capsule biopsy bilateral breasts

## 2019-06-13 NOTE — ASU PATIENT PROFILE, ADULT - PSH
History of augmentation of both breasts  implants placed 11 years ago  History of tonsillectomy    Melanoma of skin  13 years ago

## 2019-06-14 ENCOUNTER — EMERGENCY (EMERGENCY)
Facility: HOSPITAL | Age: 34
LOS: 1 days | Discharge: HOME | End: 2019-06-14
Attending: STUDENT IN AN ORGANIZED HEALTH CARE EDUCATION/TRAINING PROGRAM | Admitting: STUDENT IN AN ORGANIZED HEALTH CARE EDUCATION/TRAINING PROGRAM
Payer: COMMERCIAL

## 2019-06-14 VITALS
DIASTOLIC BLOOD PRESSURE: 59 MMHG | SYSTOLIC BLOOD PRESSURE: 113 MMHG | RESPIRATION RATE: 16 BRPM | OXYGEN SATURATION: 100 % | HEART RATE: 66 BPM | TEMPERATURE: 98 F

## 2019-06-14 VITALS
SYSTOLIC BLOOD PRESSURE: 101 MMHG | TEMPERATURE: 98 F | DIASTOLIC BLOOD PRESSURE: 57 MMHG | RESPIRATION RATE: 18 BRPM | OXYGEN SATURATION: 100 % | HEART RATE: 60 BPM

## 2019-06-14 DIAGNOSIS — C43.9 MALIGNANT MELANOMA OF SKIN, UNSPECIFIED: Chronic | ICD-10-CM

## 2019-06-14 DIAGNOSIS — Z90.89 ACQUIRED ABSENCE OF OTHER ORGANS: Chronic | ICD-10-CM

## 2019-06-14 DIAGNOSIS — Z98.890 OTHER SPECIFIED POSTPROCEDURAL STATES: Chronic | ICD-10-CM

## 2019-06-14 LAB
ALBUMIN SERPL ELPH-MCNC: 4.4 G/DL — SIGNIFICANT CHANGE UP (ref 3.3–5)
ALP SERPL-CCNC: 32 U/L — LOW (ref 40–120)
ALT FLD-CCNC: 11 U/L — SIGNIFICANT CHANGE UP (ref 4–33)
ANION GAP SERPL CALC-SCNC: 13 MMO/L — SIGNIFICANT CHANGE UP (ref 7–14)
ANISOCYTOSIS BLD QL: SLIGHT — SIGNIFICANT CHANGE UP
AST SERPL-CCNC: 16 U/L — SIGNIFICANT CHANGE UP (ref 4–32)
BASOPHILS # BLD AUTO: 0.05 K/UL — SIGNIFICANT CHANGE UP (ref 0–0.2)
BASOPHILS NFR BLD AUTO: 0.4 % — SIGNIFICANT CHANGE UP (ref 0–2)
BASOPHILS NFR SPEC: 0 % — SIGNIFICANT CHANGE UP (ref 0–2)
BILIRUB SERPL-MCNC: 0.5 MG/DL — SIGNIFICANT CHANGE UP (ref 0.2–1.2)
BLASTS # FLD: 0 % — SIGNIFICANT CHANGE UP (ref 0–0)
BUN SERPL-MCNC: 8 MG/DL — SIGNIFICANT CHANGE UP (ref 7–23)
CALCIUM SERPL-MCNC: 9.4 MG/DL — SIGNIFICANT CHANGE UP (ref 8.4–10.5)
CHLORIDE SERPL-SCNC: 105 MMOL/L — SIGNIFICANT CHANGE UP (ref 98–107)
CO2 SERPL-SCNC: 24 MMOL/L — SIGNIFICANT CHANGE UP (ref 22–31)
CREAT SERPL-MCNC: 1.02 MG/DL — SIGNIFICANT CHANGE UP (ref 0.5–1.3)
EOSINOPHIL # BLD AUTO: 0.07 K/UL — SIGNIFICANT CHANGE UP (ref 0–0.5)
EOSINOPHIL NFR BLD AUTO: 0.6 % — SIGNIFICANT CHANGE UP (ref 0–6)
EOSINOPHIL NFR FLD: 1.7 % — SIGNIFICANT CHANGE UP (ref 0–6)
GLUCOSE SERPL-MCNC: 92 MG/DL — SIGNIFICANT CHANGE UP (ref 70–99)
HCT VFR BLD CALC: 40.6 % — SIGNIFICANT CHANGE UP (ref 34.5–45)
HGB BLD-MCNC: 13.7 G/DL — SIGNIFICANT CHANGE UP (ref 11.5–15.5)
IMM GRANULOCYTES NFR BLD AUTO: 0.3 % — SIGNIFICANT CHANGE UP (ref 0–1.5)
LYMPHOCYTES # BLD AUTO: 45.1 % — HIGH (ref 13–44)
LYMPHOCYTES # BLD AUTO: 5.04 K/UL — HIGH (ref 1–3.3)
LYMPHOCYTES NFR SPEC AUTO: 38.3 % — SIGNIFICANT CHANGE UP (ref 13–44)
MCHC RBC-ENTMCNC: 29 PG — SIGNIFICANT CHANGE UP (ref 27–34)
MCHC RBC-ENTMCNC: 33.7 % — SIGNIFICANT CHANGE UP (ref 32–36)
MCV RBC AUTO: 85.8 FL — SIGNIFICANT CHANGE UP (ref 80–100)
METAMYELOCYTES # FLD: 0 % — SIGNIFICANT CHANGE UP (ref 0–1)
MONOCYTES # BLD AUTO: 0.74 K/UL — SIGNIFICANT CHANGE UP (ref 0–0.9)
MONOCYTES NFR BLD AUTO: 6.6 % — SIGNIFICANT CHANGE UP (ref 2–14)
MONOCYTES NFR BLD: 7 % — SIGNIFICANT CHANGE UP (ref 2–9)
MYELOCYTES NFR BLD: 0 % — SIGNIFICANT CHANGE UP (ref 0–0)
NEUTROPHIL AB SER-ACNC: 48.7 % — SIGNIFICANT CHANGE UP (ref 43–77)
NEUTROPHILS # BLD AUTO: 5.25 K/UL — SIGNIFICANT CHANGE UP (ref 1.8–7.4)
NEUTROPHILS NFR BLD AUTO: 47 % — SIGNIFICANT CHANGE UP (ref 43–77)
NEUTS BAND # BLD: 0 % — SIGNIFICANT CHANGE UP (ref 0–6)
NRBC # FLD: 0 K/UL — SIGNIFICANT CHANGE UP (ref 0–0)
OTHER - HEMATOLOGY %: 0 — SIGNIFICANT CHANGE UP
PLATELET # BLD AUTO: 245 K/UL — SIGNIFICANT CHANGE UP (ref 150–400)
PLATELET COUNT - ESTIMATE: NORMAL — SIGNIFICANT CHANGE UP
PMV BLD: 9.9 FL — SIGNIFICANT CHANGE UP (ref 7–13)
POTASSIUM SERPL-MCNC: 3.5 MMOL/L — SIGNIFICANT CHANGE UP (ref 3.5–5.3)
POTASSIUM SERPL-SCNC: 3.5 MMOL/L — SIGNIFICANT CHANGE UP (ref 3.5–5.3)
PROMYELOCYTES # FLD: 0 % — SIGNIFICANT CHANGE UP (ref 0–0)
PROT SERPL-MCNC: 6.6 G/DL — SIGNIFICANT CHANGE UP (ref 6–8.3)
RBC # BLD: 4.73 M/UL — SIGNIFICANT CHANGE UP (ref 3.8–5.2)
RBC # FLD: 12.4 % — SIGNIFICANT CHANGE UP (ref 10.3–14.5)
SODIUM SERPL-SCNC: 142 MMOL/L — SIGNIFICANT CHANGE UP (ref 135–145)
VARIANT LYMPHS # BLD: 4.3 % — SIGNIFICANT CHANGE UP
WBC # BLD: 11.18 K/UL — HIGH (ref 3.8–10.5)
WBC # FLD AUTO: 11.18 K/UL — HIGH (ref 3.8–10.5)

## 2019-06-14 PROCEDURE — 93010 ELECTROCARDIOGRAM REPORT: CPT

## 2019-06-14 PROCEDURE — 71275 CT ANGIOGRAPHY CHEST: CPT | Mod: 26

## 2019-06-14 PROCEDURE — 99284 EMERGENCY DEPT VISIT MOD MDM: CPT | Mod: 25

## 2019-06-14 PROCEDURE — 93970 EXTREMITY STUDY: CPT | Mod: 26

## 2019-06-14 NOTE — ED PROVIDER NOTE - NSFOLLOWUPINSTRUCTIONS_ED_ALL_ED_FT
Advance activity as tolerated.  Continue all previously prescribed medications as directed unless otherwise instructed.  Use Incentive Spirometer, performing 10 deep breaths four times every hour as shown to you in the Emergency Department.  This is important in promoting good lung expansion.  Continue until pain improves. Follow up with Dr. Mccarthy on Monday 6/17/19- bring copies of your results.  Return to the ER for worsening or persistent symptoms, including but not limited to worsening/persistent lightheadedness, chest pain, shortness of breath, and/or ANY NEW OR CONCERNING SYMPTOMS. If you have issues obtaining follow up, please call: [phone] to obtain a doctor or specialist who takes your insurance in your area.  You may call [phone] to make an appointment with the internal medicine clinic.

## 2019-06-14 NOTE — ED PROVIDER NOTE - CARE PLAN
Principal Discharge DX:	Shortness of breath  Assessment and plan of treatment:	Advance activity as tolerated.  Continue all previously prescribed medications as directed unless otherwise instructed.  Use Incentive Spirometer, performing 10 deep breaths four times every hour as shown to you in the Emergency Department.  This is important in promoting good lung expansion.  Continue until pain improves. Follow up with Dr. Mccarthy on Monday 6/17/19- bring copies of your results.  Return to the ER for worsening or persistent symptoms, including but not limited to worsening/persistent lightheadedness, chest pain, shortness of breath, and/or ANY NEW OR CONCERNING SYMPTOMS. If you have issues obtaining follow up, please call: [phone] to obtain a doctor or specialist who takes your insurance in your area.  You may call [phone] to make an appointment with the internal medicine clinic.

## 2019-06-14 NOTE — ED ADULT NURSE NOTE - OBJECTIVE STATEMENT
Pt. A&Ox4, c/o sob starting around 10am. Denies cp, dizziness, palpitations or n/v. States she had breast implants removed yesterday with no complications. States #20g IV placed in left AC, labs sent. MD at bedside for eval. VSS, breathing well on RA. Respirations even and unlabored. Will continue to monitor.

## 2019-06-14 NOTE — ED PROVIDER NOTE - CLINICAL SUMMARY MEDICAL DECISION MAKING FREE TEXT BOX
Pt is a 33 y/o F PMHx breast implant removal yesterday p/w shortness of breath today -- r/o PE, r/o DVT, likely post surgical atelectasis -- labs, cta, bilateral lower extremity duplex

## 2019-06-14 NOTE — ED PROVIDER NOTE - ATTENDING CONTRIBUTION TO CARE
33 yo female s/p breast implant removal yesterday, presents o ED for evaluation of SOB today, sentt o ED by surgical team for r/o DVT and PE. Denies any feers, chills, cough, chest pain. Reports her SOB is improved now but not resolved.   Gen: no acute distress, well appearing, awake, alert and oriented x 3  Cardiac: regular rate and rhythm, +S1S2  Pulm: Clear to auscultation bilaterally  Abd: soft, nontender, nondistended, no guarding  Back: neg CVA ttp, nontender spine  Extremity: no edema, no deformity, warm and well perfused, FROM all extremities    MDM  Young female postop with sob and leg pain - Will check labs, EKG, imaging, medicate, reassess

## 2019-06-14 NOTE — PROGRESS NOTE ADULT - SUBJECTIVE AND OBJECTIVE BOX
CC: 34y old Female admitted with a chief complaint of SOB , now    HPI: Patient is a 34F POD1 from removal of breast implants presenting to the ED with SOB.  The SOB began when she woke up this morning.  She has never had SOB like this before.  She underwent removal of breast implants yesterday, tolerated the procedure well and went home same day.  This morning she had increased WOB with talking and walking.  She spoke to her doctor who suggested she come to the ED for evaluation.  She denies fevers, chill, N/V, chest pain, palpitations.        PMHx: Malignant melanoma, unspecified site    PSHx: History of augmentation of both breasts  Melanoma of skin  History of tonsillectomy      Allergies: codeine (Pruritus)    Family Hx: No pertinent family history in first degree relatives      Physical Exam  T(C): 36.7  HR: 60 (60 - 68)  BP: 101/57 (101/57 - 126/67)  RR: 18 (16 - 18)  SpO2: 100% (100% - 100%)  Tmax: T(C): , Max: 36.7 (06-14-19 @ 14:52)      General: well developed, well nourished, NAD  Neuro: alert and oriented, no focal deficits, moves all extremities spontaneously  HEENT: NCAT, EOMI, anicteric, mucosa moist  Respiratory: mild increased WOB  CVS: regular rate and rhythm  Abdomen: soft, nontender, nondistended  Extremities: no edema, sensation and movement grossly intact  Skin: warm, dry, appropriate color      Labs:                        13.7   11.18 )-----------( 245      ( 14 Jun 2019 14:45 )             40.6       06-14    142  |  105  |  8   ----------------------------<  92  3.5   |  24  |  1.02    Ca    9.4      14 Jun 2019 14:45    TPro  6.6  /  Alb  4.4  /  TBili  0.5  /  DBili  x   /  AST  16  /  ALT  11  /  AlkPhos  32<L>  06-14            Imaging and other studies:'  < from: CT Angio Chest w/ IV Cont (06.14.19 @ 15:13) >  IMPRESSION:     No pulmonary embolus.      < end of copied text >    < from: US Duplex Venous Lower Ext Complete, Bilateral (06.14.19 @ 15:49) >  FINDINGS:    There is normal compressibility of the bilateral common femoral, femoral   and popliteal veins.     Doppler examination shows normal spontaneous and phasic flow.    No calf vein thrombosis is detected.    IMPRESSION:     No evidence of bilateral lower extremity deep venous thrombosis.    < end of copied text >

## 2019-06-14 NOTE — ED PROVIDER NOTE - OBJECTIVE STATEMENT
Pt is a 35 y/o F PMHx breast implant removal yesterday p/w shortness of breath today.  Pt states today, she developed mild gradual onset shortness of breath, which has improved since.  Pt also notes mild dull left lower leg pain, which has resolved.  Pt offers no other complaints.  Pt denies fevers, chills, nausea, vomiting, chest pain, lightheadedness, or any other specific complaints.

## 2019-06-14 NOTE — PROGRESS NOTE ADULT - ASSESSMENT
Patient is a 34F with SOB POD 1 from breast implant removal.  No PE or DVT noted  - no acute vascular surgery at this time  - continue post-op care per PRS  - seen and examined with vascular fellow Dr. Cody    [id number]

## 2019-06-14 NOTE — ED PROVIDER NOTE - PLAN OF CARE
Advance activity as tolerated.  Continue all previously prescribed medications as directed unless otherwise instructed.  Use Incentive Spirometer, performing 10 deep breaths four times every hour as shown to you in the Emergency Department.  This is important in promoting good lung expansion.  Continue until pain improves. Follow up with Dr. Mccarthy on Monday 6/17/19- bring copies of your results.  Return to the ER for worsening or persistent symptoms, including but not limited to worsening/persistent lightheadedness, chest pain, shortness of breath, and/or ANY NEW OR CONCERNING SYMPTOMS. If you have issues obtaining follow up, please call: [phone] to obtain a doctor or specialist who takes your insurance in your area.  You may call [phone] to make an appointment with the internal medicine clinic.

## 2019-06-14 NOTE — ED PROVIDER NOTE - PROGRESS NOTE DETAILS
LEON FERMIN:  CTA negative for PE.  Duplex negative for DVT.  Pt medically stable for discharge.  Pt seen by general surgery, vascular surgery and plastic surgery.  Pt was recommended to follow up with Dr. Mccarthy on Monday 6/17/19.  RN to give incentive spirometry education.

## 2019-06-14 NOTE — ED PROVIDER NOTE - PHYSICAL EXAMINATION
No le edema.  No calf tenderness. No palpable cords.  +dressing in place at bilateral inframammary folds, dry, clean, intact, w/o surrounding redness, warmth, tenderness, or swelling

## 2019-06-17 ENCOUNTER — APPOINTMENT (OUTPATIENT)
Dept: PLASTIC SURGERY | Facility: CLINIC | Age: 34
End: 2019-06-17
Payer: COMMERCIAL

## 2019-06-17 DIAGNOSIS — T85.9XXA UNSPECIFIED COMPLICATION OF INTERNAL PROSTHETIC DEVICE, IMPLANT AND GRAFT, INITIAL ENCOUNTER: ICD-10-CM

## 2019-06-17 PROCEDURE — 99024 POSTOP FOLLOW-UP VISIT: CPT

## 2019-06-19 PROBLEM — T85.9XXA COMPLICATION OF BREAST IMPLANT: Status: ACTIVE | Noted: 2019-02-20

## 2019-07-07 NOTE — PHYSICAL EXAM
[NI] : Normal [de-identified] : Breast incisions healing well no sign of infection no erythema no fluid collections no hematoma

## 2019-07-07 NOTE — HISTORY OF PRESENT ILLNESS
[FreeTextEntry1] : Pt s/p removal of bilateral implants.  Pt doing well no complaints.  No SOB or chest pain.

## 2019-11-10 ENCOUNTER — RESULT REVIEW (OUTPATIENT)
Age: 34
End: 2019-11-10

## 2020-01-08 ENCOUNTER — TRANSCRIPTION ENCOUNTER (OUTPATIENT)
Age: 35
End: 2020-01-08

## 2020-03-10 ENCOUNTER — TRANSCRIPTION ENCOUNTER (OUTPATIENT)
Age: 35
End: 2020-03-10

## 2020-03-18 ENCOUNTER — EMERGENCY (EMERGENCY)
Facility: HOSPITAL | Age: 35
LOS: 0 days | Discharge: HOME | End: 2020-03-18
Payer: COMMERCIAL

## 2020-03-18 VITALS
HEART RATE: 139 BPM | RESPIRATION RATE: 16 BRPM | TEMPERATURE: 99 F | OXYGEN SATURATION: 96 % | SYSTOLIC BLOOD PRESSURE: 119 MMHG | DIASTOLIC BLOOD PRESSURE: 91 MMHG

## 2020-03-18 DIAGNOSIS — Z98.890 OTHER SPECIFIED POSTPROCEDURAL STATES: Chronic | ICD-10-CM

## 2020-03-18 DIAGNOSIS — Z90.89 ACQUIRED ABSENCE OF OTHER ORGANS: Chronic | ICD-10-CM

## 2020-03-18 DIAGNOSIS — R05 COUGH: ICD-10-CM

## 2020-03-18 DIAGNOSIS — B97.29 OTHER CORONAVIRUS AS THE CAUSE OF DISEASES CLASSIFIED ELSEWHERE: ICD-10-CM

## 2020-03-18 DIAGNOSIS — Z20.828 CONTACT WITH AND (SUSPECTED) EXPOSURE TO OTHER VIRAL COMMUNICABLE DISEASES: ICD-10-CM

## 2020-03-18 DIAGNOSIS — B34.9 VIRAL INFECTION, UNSPECIFIED: ICD-10-CM

## 2020-03-18 DIAGNOSIS — C43.9 MALIGNANT MELANOMA OF SKIN, UNSPECIFIED: Chronic | ICD-10-CM

## 2020-03-18 DIAGNOSIS — B33.8 OTHER SPECIFIED VIRAL DISEASES: ICD-10-CM

## 2020-03-18 DIAGNOSIS — R50.9 FEVER, UNSPECIFIED: ICD-10-CM

## 2020-03-18 DIAGNOSIS — J02.9 ACUTE PHARYNGITIS, UNSPECIFIED: ICD-10-CM

## 2020-03-18 PROCEDURE — 99283 EMERGENCY DEPT VISIT LOW MDM: CPT

## 2020-03-18 PROCEDURE — U0001: CPT

## 2020-03-18 NOTE — ED ADULT NURSE NOTE - CAS DISCH ACCOMP BY
Discharge instructions given verbally and understood by patient. Self-quarantine and COVID-19 information provided to patient. Unable to sign secondary to COVID-19 PUI.

## 2020-03-18 NOTE — ED STATDOCS - PHYSICAL EXAMINATION
Alert and oriented x 3, NCAT.  Nontoxic appearing. oral mucosa with MMM, no injection of posterior oropharynx. Eyes with EOMI, PEERL.  Neck Supple.  Lungs CTAB, normal respiratory rate, no distress.  Cardiac with regular rate and rhythm.  Abdomen is soft, NT, ND.  No rashes.  bilateral UE and LE with FROM and strength. Sensation intact.

## 2020-03-18 NOTE — ED STATDOCS - NSFOLLOWUPINSTRUCTIONS_ED_ALL_ED_FT
How to get your Coronavirus (COVID-19) Testing Results:   Please be advised that you were tested for the coronavirus (COVID-19) in the Emergency Department at [facility].  You are to maintain self-quarantine procedures for 14 days until instructed otherwise by one of our healthcare agents. Please note that the test may take up to 2-4 days to result.  If you do not hear from us within 72 hours and you'd like to check on your results, you can call on of our coronavirus specialists at [address] [place] [address] (available 24/7).  Please DO NOT call the site where you received the test to obtain your results.

## 2020-03-18 NOTE — ED STATDOCS - OBJECTIVE STATEMENT
Pt presents to ED with fever, cough, runny nose, body aches, sore throat x  days. Pt recently exposed to COVID-19 9 days ago. Pt here for testing.

## 2020-03-18 NOTE — ED STATDOCS - CLINICAL SUMMARY MEDICAL DECISION MAKING FREE TEXT BOX
patient presents with URI symptoms, fever and concern for COVID exposure.  As patient is nontoxic appearing will test for COVID and d/c.  Quarantine reviewed and return precautions reviewed.

## 2020-03-18 NOTE — ED STATDOCS - PATIENT PORTAL LINK FT
You can access the FollowMyHealth Patient Portal offered by [facility] by registering at the following website: [email or web address]. By joining [facility]’s FollowMyHealth portal, you will also be able to view your health information using other applications (apps) compatible with our system.

## 2020-03-18 NOTE — ED STATDOCS - NS ED ROS FT
ROS: Constitutional- +fever, -chills.  Respiratory- +cough, +SOB  Cardiac- no chest pain, no palpitations, ENT- +rhinorrhea, no sore throat, no congestion.  Abdomen- No nausea, no vomiting, no diarrhea.  Urinary- no dysuria, no urgency, no frequency.  Skin- No rashes

## 2020-03-19 LAB — SARS-COV-2 RNA SPEC QL NAA+PROBE: SIGNIFICANT CHANGE UP

## 2020-04-01 ENCOUNTER — NON-APPOINTMENT (OUTPATIENT)
Age: 35
End: 2020-04-01

## 2020-04-02 DIAGNOSIS — J22 UNSPECIFIED ACUTE LOWER RESPIRATORY INFECTION: ICD-10-CM

## 2020-04-25 ENCOUNTER — MESSAGE (OUTPATIENT)
Age: 35
End: 2020-04-25

## 2020-05-06 LAB
SARS-COV-2 IGG SERPL IA-ACNC: <0.1 INDEX
SARS-COV-2 IGG SERPL QL IA: NEGATIVE

## 2020-05-13 ENCOUNTER — APPOINTMENT (OUTPATIENT)
Dept: GASTROENTEROLOGY | Facility: CLINIC | Age: 35
End: 2020-05-13
Payer: COMMERCIAL

## 2020-05-13 VITALS
SYSTOLIC BLOOD PRESSURE: 110 MMHG | OXYGEN SATURATION: 100 % | HEIGHT: 64 IN | TEMPERATURE: 98.4 F | HEART RATE: 72 BPM | WEIGHT: 118 LBS | RESPIRATION RATE: 15 BRPM | DIASTOLIC BLOOD PRESSURE: 70 MMHG | BODY MASS INDEX: 20.14 KG/M2

## 2020-05-13 PROCEDURE — 99204 OFFICE O/P NEW MOD 45 MIN: CPT

## 2020-05-13 RX ORDER — FLUCONAZOLE 150 MG/1
150 TABLET ORAL
Qty: 2 | Refills: 0 | Status: DISCONTINUED | COMMUNITY
Start: 2019-04-26 | End: 2020-05-13

## 2020-05-13 RX ORDER — AMOXICILLIN AND CLAVULANATE POTASSIUM 875; 125 MG/1; MG/1
875-125 TABLET, COATED ORAL
Qty: 20 | Refills: 0 | Status: DISCONTINUED | COMMUNITY
Start: 2019-04-26 | End: 2020-05-13

## 2020-05-13 RX ORDER — AZELASTINE HYDROCHLORIDE 205.5 UG/1
0.15 SPRAY, METERED NASAL TWICE DAILY
Qty: 1 | Refills: 0 | Status: DISCONTINUED | COMMUNITY
Start: 2019-04-26 | End: 2020-05-13

## 2020-05-13 RX ORDER — FLUOCINOLONE ACETONIDE 0.11 MG/ML
0.01 OIL AURICULAR (OTIC)
Qty: 1 | Refills: 0 | Status: DISCONTINUED | COMMUNITY
Start: 2019-02-19 | End: 2020-05-13

## 2020-05-13 RX ORDER — DOXYCYCLINE HYCLATE 100 MG/1
100 TABLET ORAL
Qty: 14 | Refills: 0 | Status: DISCONTINUED | COMMUNITY
Start: 2020-04-02 | End: 2020-05-13

## 2020-05-13 NOTE — PHYSICAL EXAM
[General Appearance - Alert] : alert [General Appearance - In No Acute Distress] : in no acute distress [General Appearance - Well Developed] : well developed [General Appearance - Well Nourished] : well nourished [] : no respiratory distress [Neck Appearance] : the appearance of the neck was normal [Sclera] : the sclera and conjunctiva were normal [Bowel Sounds] : normal bowel sounds [Abdomen Soft] : soft [Respiration, Rhythm And Depth] : normal respiratory rhythm and effort [Abdomen Tenderness] : non-tender [Abnormal Walk] : normal gait [Skin Color & Pigmentation] : normal skin color and pigmentation [Oriented To Time, Place, And Person] : oriented to person, place, and time

## 2020-05-14 LAB
ALBUMIN SERPL ELPH-MCNC: 4.9 G/DL
ALP BLD-CCNC: 36 U/L
ALT SERPL-CCNC: 11 U/L
ANION GAP SERPL CALC-SCNC: 12 MMOL/L
AST SERPL-CCNC: 14 U/L
BASOPHILS # BLD AUTO: 0.08 K/UL
BASOPHILS NFR BLD AUTO: 0.8 %
BILIRUB SERPL-MCNC: 0.5 MG/DL
BUN SERPL-MCNC: 14 MG/DL
CALCIUM SERPL-MCNC: 10.1 MG/DL
CHLORIDE SERPL-SCNC: 101 MMOL/L
CO2 SERPL-SCNC: 27 MMOL/L
CREAT SERPL-MCNC: 0.81 MG/DL
CRP SERPL-MCNC: <0.1 MG/DL
EOSINOPHIL # BLD AUTO: 0.1 K/UL
EOSINOPHIL NFR BLD AUTO: 1 %
GLUCOSE SERPL-MCNC: 84 MG/DL
HCT VFR BLD CALC: 44.7 %
HGB BLD-MCNC: 14.2 G/DL
IMM GRANULOCYTES NFR BLD AUTO: 0.2 %
LYMPHOCYTES # BLD AUTO: 3.24 K/UL
LYMPHOCYTES NFR BLD AUTO: 33.5 %
MAN DIFF?: NORMAL
MCHC RBC-ENTMCNC: 28.8 PG
MCHC RBC-ENTMCNC: 31.8 GM/DL
MCV RBC AUTO: 90.7 FL
MONOCYTES # BLD AUTO: 0.77 K/UL
MONOCYTES NFR BLD AUTO: 8 %
NEUTROPHILS # BLD AUTO: 5.47 K/UL
NEUTROPHILS NFR BLD AUTO: 56.5 %
PLATELET # BLD AUTO: 309 K/UL
POTASSIUM SERPL-SCNC: 4.1 MMOL/L
PROT SERPL-MCNC: 7 G/DL
RBC # BLD: 4.93 M/UL
RBC # FLD: 12.7 %
SODIUM SERPL-SCNC: 140 MMOL/L
WBC # FLD AUTO: 9.68 K/UL

## 2020-05-15 NOTE — CONSULT LETTER
[Dear  ___] : Dear  [unfilled], [Consult Letter:] : I had the pleasure of evaluating your patient, [unfilled]. [Please see my note below.] : Please see my note below. [Consult Closing:] : Thank you very much for allowing me to participate in the care of this patient.  If you have any questions, please do not hesitate to contact me. [Sincerely,] : Sincerely, [FreeTextEntry3] : Lamine Arredondo MD\par Associate Professor of Medicine\par Director IBD Program\par [facility]\par

## 2020-05-15 NOTE — HISTORY OF PRESENT ILLNESS
[de-identified] : 35 Y F, hx tonsillectomy (2006), melanoma excision (2006), IBS-C, presents today to rule out Crohn's Disease due to positive ASCA IgA/IgG antibodies in 2017, referred by Dr. Cortez (she is  at his office). \par \par Pt reports she's had alternating constipation and diarrhea for years. She had a cscope in 2012 for these symptoms; was told she had "spastic" colon. Took bentyl at the time, she got constipated and had pain so she stopped.\par 2018 she began developing hand rashs/edema and stiffness; saw Dr. Billings and her ASCA IgG antibodies were positive. Was told she was allergic to several products and since switching off these topical products she doesn't have the rash anymore; does cont to have had stiffness during illnesses (like the flu this year).\par Now having episodes of diarrhea; about 5x/day on some days; other days BSC 1. No pain. Some episodes of nausea; goes into fetal position to help; no vomiting. No blood in stool. No bloating. \par Mix of formed and loose; no triggers for loose stool. \par No straining, does feel complete after BM.\par No fevers, but does report chills the days she's having diarrhea. 1 week ago had episode of diarrhea, now having tru. More bothered by diarrhea. BSC 6 mostly, on occasion BSC 1; \par Raw garlic causes nausea. \par No heartburn. \par Has tried metamucil in the past with slight improvement. Has eliminated gluten with no change but wasn't very strict. \par Denies apthous ulcer, no rashes recurrent, stiff joints in b/l hands \par \par Fam hx: mother with either UC or infectious colitis or stomach ulcers, grandfather with stomach cancer; her paternal aunt psoriatic arthritis, paternal cousin psoriasis\par Social hx: alcohol use 1-2 drinks/week; no drug use; no tobacco use

## 2020-05-15 NOTE — ASSESSMENT
[FreeTextEntry1] : 35 Y F, hx tonsillectomy (2006), melanoma excision (2006), IBS-C, presents today to rule out Crohn's Disease due to positive ASCA IgA/IgG antibodies in 2017, referred by Dr. Cortez (she is  at his office). \par \par \par Positive ASCA IgA/IgG antibodies\par - rule out IBD with colonoscopy; priority level 2\par - two day prep advised given history of poor prep\par - labs to be done pre-procedure \par \par IBS-C\par - evaluate with abdominal xray extent of constipation \par - will discuss promotility agents/laxatives after cscope; pt reports if she has no chronic inflammatory disease she prefers to not take any medication\par - May need to consider ARM given need for disimpaction in past.\par \par F/U post-procedure

## 2020-06-02 LAB — CALPROTECTIN FECAL: 26 UG/G

## 2020-07-14 ENCOUNTER — APPOINTMENT (OUTPATIENT)
Dept: HUMAN REPRODUCTION | Facility: CLINIC | Age: 35
End: 2020-07-14
Payer: COMMERCIAL

## 2020-07-14 PROCEDURE — 99204 OFFICE O/P NEW MOD 45 MIN: CPT | Mod: 95

## 2020-09-10 ENCOUNTER — APPOINTMENT (OUTPATIENT)
Dept: ANTEPARTUM | Facility: CLINIC | Age: 35
End: 2020-09-10

## 2020-09-10 ENCOUNTER — ASOB RESULT (OUTPATIENT)
Age: 35
End: 2020-09-10

## 2020-09-16 ENCOUNTER — APPOINTMENT (OUTPATIENT)
Dept: ANTEPARTUM | Facility: CLINIC | Age: 35
End: 2020-09-16
Payer: COMMERCIAL

## 2020-09-16 ENCOUNTER — ASOB RESULT (OUTPATIENT)
Age: 35
End: 2020-09-16

## 2020-09-16 ENCOUNTER — OUTPATIENT (OUTPATIENT)
Dept: OUTPATIENT SERVICES | Facility: HOSPITAL | Age: 35
LOS: 1 days | End: 2020-09-16
Payer: COMMERCIAL

## 2020-09-16 ENCOUNTER — LABORATORY RESULT (OUTPATIENT)
Age: 35
End: 2020-09-16

## 2020-09-16 DIAGNOSIS — C43.9 MALIGNANT MELANOMA OF SKIN, UNSPECIFIED: Chronic | ICD-10-CM

## 2020-09-16 DIAGNOSIS — Z98.890 OTHER SPECIFIED POSTPROCEDURAL STATES: Chronic | ICD-10-CM

## 2020-09-16 DIAGNOSIS — Z01.818 ENCOUNTER FOR OTHER PREPROCEDURAL EXAMINATION: ICD-10-CM

## 2020-09-16 DIAGNOSIS — Z90.89 ACQUIRED ABSENCE OF OTHER ORGANS: Chronic | ICD-10-CM

## 2020-09-16 PROCEDURE — 76945 ECHO GUIDE VILLUS SAMPLING: CPT

## 2020-09-16 PROCEDURE — 76945 ECHO GUIDE VILLUS SAMPLING: CPT | Mod: 26

## 2020-09-16 PROCEDURE — 88280 CHROMOSOME KARYOTYPE STUDY: CPT

## 2020-09-16 PROCEDURE — 76801 OB US < 14 WKS SINGLE FETUS: CPT

## 2020-09-16 PROCEDURE — 88267 CHROMOSOME ANALYS PLACENTA: CPT

## 2020-09-16 PROCEDURE — 36415 COLL VENOUS BLD VENIPUNCTURE: CPT

## 2020-09-16 PROCEDURE — 99212 OFFICE O/P EST SF 10 MIN: CPT | Mod: 25

## 2020-09-16 PROCEDURE — 88235 TISSUE CULTURE PLACENTA: CPT

## 2020-09-16 PROCEDURE — 59015 CHORION BIOPSY: CPT

## 2020-09-16 PROCEDURE — 88291 CYTO/MOLECULAR REPORT: CPT

## 2020-09-16 PROCEDURE — 88285 CHROMOSOME COUNT ADDITIONAL: CPT

## 2020-09-16 PROCEDURE — 88275 CYTOGENETICS 100-300: CPT

## 2020-09-16 PROCEDURE — 88271 CYTOGENETICS DNA PROBE: CPT

## 2020-09-19 ENCOUNTER — OUTPATIENT (OUTPATIENT)
Dept: OUTPATIENT SERVICES | Facility: HOSPITAL | Age: 35
LOS: 1 days | End: 2020-09-19
Payer: COMMERCIAL

## 2020-09-19 DIAGNOSIS — Z90.89 ACQUIRED ABSENCE OF OTHER ORGANS: Chronic | ICD-10-CM

## 2020-09-19 DIAGNOSIS — Z11.59 ENCOUNTER FOR SCREENING FOR OTHER VIRAL DISEASES: ICD-10-CM

## 2020-09-19 DIAGNOSIS — Z98.890 OTHER SPECIFIED POSTPROCEDURAL STATES: Chronic | ICD-10-CM

## 2020-09-19 DIAGNOSIS — C43.9 MALIGNANT MELANOMA OF SKIN, UNSPECIFIED: Chronic | ICD-10-CM

## 2020-09-19 LAB — SARS-COV-2 RNA SPEC QL NAA+PROBE: SIGNIFICANT CHANGE UP

## 2020-09-19 PROCEDURE — U0003: CPT

## 2020-09-21 ENCOUNTER — TRANSCRIPTION ENCOUNTER (OUTPATIENT)
Age: 35
End: 2020-09-21

## 2020-09-21 ENCOUNTER — APPOINTMENT (OUTPATIENT)
Dept: OBGYN | Facility: CLINIC | Age: 35
End: 2020-09-21
Payer: COMMERCIAL

## 2020-09-21 VITALS
HEART RATE: 67 BPM | DIASTOLIC BLOOD PRESSURE: 72 MMHG | BODY MASS INDEX: 21.17 KG/M2 | SYSTOLIC BLOOD PRESSURE: 116 MMHG | HEIGHT: 64 IN | WEIGHT: 124 LBS

## 2020-09-21 DIAGNOSIS — O35.1XX0 MATERNAL CARE FOR (SUSPECTED) CHROMOSOMAL ABNORMALITY IN FETUS, NOT APPLICABLE OR UNSPECIFIED: ICD-10-CM

## 2020-09-21 PROCEDURE — 99204 OFFICE O/P NEW MOD 45 MIN: CPT

## 2020-09-22 ENCOUNTER — OUTPATIENT (OUTPATIENT)
Dept: OUTPATIENT SERVICES | Facility: HOSPITAL | Age: 35
LOS: 1 days | End: 2020-09-22
Payer: COMMERCIAL

## 2020-09-22 ENCOUNTER — RESULT REVIEW (OUTPATIENT)
Age: 35
End: 2020-09-22

## 2020-09-22 ENCOUNTER — APPOINTMENT (OUTPATIENT)
Dept: OBGYN | Facility: CLINIC | Age: 35
End: 2020-09-22

## 2020-09-22 VITALS
WEIGHT: 119.93 LBS | RESPIRATION RATE: 16 BRPM | HEIGHT: 64 IN | OXYGEN SATURATION: 100 % | TEMPERATURE: 98 F | DIASTOLIC BLOOD PRESSURE: 80 MMHG | HEART RATE: 69 BPM | SYSTOLIC BLOOD PRESSURE: 114 MMHG

## 2020-09-22 VITALS
OXYGEN SATURATION: 100 % | DIASTOLIC BLOOD PRESSURE: 59 MMHG | TEMPERATURE: 97 F | SYSTOLIC BLOOD PRESSURE: 105 MMHG | HEART RATE: 72 BPM | RESPIRATION RATE: 16 BRPM

## 2020-09-22 DIAGNOSIS — Z98.890 OTHER SPECIFIED POSTPROCEDURAL STATES: Chronic | ICD-10-CM

## 2020-09-22 DIAGNOSIS — C43.9 MALIGNANT MELANOMA OF SKIN, UNSPECIFIED: Chronic | ICD-10-CM

## 2020-09-22 DIAGNOSIS — Z90.89 ACQUIRED ABSENCE OF OTHER ORGANS: Chronic | ICD-10-CM

## 2020-09-22 DIAGNOSIS — Z34.91 ENCOUNTER FOR SUPERVISION OF NORMAL PREGNANCY, UNSPECIFIED, FIRST TRIMESTER: ICD-10-CM

## 2020-09-22 DIAGNOSIS — O35.9XX0 MATERNAL CARE FOR (SUSPECTED) FETAL ABNORMALITY AND DAMAGE, UNSPECIFIED, NOT APPLICABLE OR UNSPECIFIED: ICD-10-CM

## 2020-09-22 LAB
ANION GAP SERPL CALC-SCNC: 12 MMOL/L — SIGNIFICANT CHANGE UP (ref 5–17)
BLD GP AB SCN SERPL QL: NEGATIVE — SIGNIFICANT CHANGE UP
BUN SERPL-MCNC: 13 MG/DL — SIGNIFICANT CHANGE UP (ref 7–23)
CALCIUM SERPL-MCNC: 9.8 MG/DL — SIGNIFICANT CHANGE UP (ref 8.4–10.5)
CHLORIDE SERPL-SCNC: 104 MMOL/L — SIGNIFICANT CHANGE UP (ref 96–108)
CO2 SERPL-SCNC: 21 MMOL/L — LOW (ref 22–31)
CREAT SERPL-MCNC: 0.65 MG/DL — SIGNIFICANT CHANGE UP (ref 0.5–1.3)
GLUCOSE SERPL-MCNC: 84 MG/DL — SIGNIFICANT CHANGE UP (ref 70–99)
HCT VFR BLD CALC: 44.3 % — SIGNIFICANT CHANGE UP (ref 34.5–45)
HGB BLD-MCNC: 15 G/DL — SIGNIFICANT CHANGE UP (ref 11.5–15.5)
MCHC RBC-ENTMCNC: 29.1 PG — SIGNIFICANT CHANGE UP (ref 27–34)
MCHC RBC-ENTMCNC: 33.9 GM/DL — SIGNIFICANT CHANGE UP (ref 32–36)
MCV RBC AUTO: 85.9 FL — SIGNIFICANT CHANGE UP (ref 80–100)
NRBC # BLD: 0 /100 WBCS — SIGNIFICANT CHANGE UP (ref 0–0)
PLATELET # BLD AUTO: 265 K/UL — SIGNIFICANT CHANGE UP (ref 150–400)
POTASSIUM SERPL-MCNC: 3.8 MMOL/L — SIGNIFICANT CHANGE UP (ref 3.5–5.3)
POTASSIUM SERPL-SCNC: 3.8 MMOL/L — SIGNIFICANT CHANGE UP (ref 3.5–5.3)
RBC # BLD: 5.16 M/UL — SIGNIFICANT CHANGE UP (ref 3.8–5.2)
RBC # FLD: 12.1 % — SIGNIFICANT CHANGE UP (ref 10.3–14.5)
RH IG SCN BLD-IMP: POSITIVE — SIGNIFICANT CHANGE UP
RH IG SCN BLD-IMP: POSITIVE — SIGNIFICANT CHANGE UP
SODIUM SERPL-SCNC: 137 MMOL/L — SIGNIFICANT CHANGE UP (ref 135–145)
WBC # BLD: 10.7 K/UL — HIGH (ref 3.8–10.5)
WBC # FLD AUTO: 10.7 K/UL — HIGH (ref 3.8–10.5)

## 2020-09-22 PROCEDURE — 88305 TISSUE EXAM BY PATHOLOGIST: CPT

## 2020-09-22 PROCEDURE — 80048 BASIC METABOLIC PNL TOTAL CA: CPT

## 2020-09-22 PROCEDURE — 76998 US GUIDE INTRAOP: CPT | Mod: 26

## 2020-09-22 PROCEDURE — 88305 TISSUE EXAM BY PATHOLOGIST: CPT | Mod: 26

## 2020-09-22 PROCEDURE — 85027 COMPLETE CBC AUTOMATED: CPT

## 2020-09-22 PROCEDURE — 86901 BLOOD TYPING SEROLOGIC RH(D): CPT

## 2020-09-22 PROCEDURE — 59840 INDUCED ABORTION D&C: CPT

## 2020-09-22 PROCEDURE — 86900 BLOOD TYPING SEROLOGIC ABO: CPT

## 2020-09-22 PROCEDURE — 86850 RBC ANTIBODY SCREEN: CPT

## 2020-09-22 RX ORDER — SODIUM CHLORIDE 9 MG/ML
1000 INJECTION, SOLUTION INTRAVENOUS
Refills: 0 | Status: DISCONTINUED | OUTPATIENT
Start: 2020-09-22 | End: 2020-10-06

## 2020-09-22 RX ORDER — SODIUM CHLORIDE 9 MG/ML
3 INJECTION INTRAMUSCULAR; INTRAVENOUS; SUBCUTANEOUS EVERY 8 HOURS
Refills: 0 | Status: DISCONTINUED | OUTPATIENT
Start: 2020-09-22 | End: 2020-09-22

## 2020-09-22 RX ORDER — ONDANSETRON 8 MG/1
4 TABLET, FILM COATED ORAL ONCE
Refills: 0 | Status: DISCONTINUED | OUTPATIENT
Start: 2020-09-22 | End: 2020-09-22

## 2020-09-22 RX ORDER — HYDROMORPHONE HYDROCHLORIDE 2 MG/ML
0.5 INJECTION INTRAMUSCULAR; INTRAVENOUS; SUBCUTANEOUS
Refills: 0 | Status: DISCONTINUED | OUTPATIENT
Start: 2020-09-22 | End: 2020-09-22

## 2020-09-22 RX ORDER — L.ACIDOPH/B.ANIMALIS/B.LONGUM 15B CELL
1 CAPSULE ORAL
Qty: 0 | Refills: 0 | DISCHARGE

## 2020-09-22 RX ORDER — CETIRIZINE HYDROCHLORIDE 10 MG/1
1 TABLET ORAL
Qty: 0 | Refills: 0 | DISCHARGE

## 2020-09-22 RX ADMIN — SODIUM CHLORIDE 3 MILLILITER(S): 9 INJECTION INTRAMUSCULAR; INTRAVENOUS; SUBCUTANEOUS at 07:41

## 2020-09-22 RX ADMIN — SODIUM CHLORIDE 125 MILLILITER(S): 9 INJECTION, SOLUTION INTRAVENOUS at 11:26

## 2020-09-22 NOTE — H&P ADULT - PROBLEM SELECTOR PLAN 1
- to OR for scheduled case  - NPO, IV Insert  - CBC, BMP, T&S  - Misoprostol 400mg Bucally 3 hours prior    CEryn Lema, PGY-2

## 2020-09-22 NOTE — BRIEF OPERATIVE NOTE - NSICDXBRIEFPOSTOP_GEN_ALL_CORE_FT
POST-OP DIAGNOSIS:  Known or suspected fetal anomaly affecting antepartum care of mother 22-Sep-2020 11:04:19  Lexii Lema

## 2020-09-22 NOTE — H&P ADULT - HISTORY OF PRESENT ILLNESS
MAYANK CAMILO  35y  Female [id number]    HPI: 36yo  at 12w5d by first trimester ultrasound presenting for scheduled termination of pregnancy due to fetal anomaly. Pregnancy complicated by cytic hygroma s/p CVS w/ FISH positive for trisomy 18. Denies vaginal bleeding, denies abdominal pain    Home Medications:  multivitamin: 1  orally once a day (2019 07:36)  Probiotic Formula oral capsule: 1 cap(s) orally once a day (2019 07:36)  ZyrTEC 10 mg oral tablet: 1 tab(s) orally once a day (2019 07:36)

## 2020-09-22 NOTE — ASU DISCHARGE PLAN (ADULT/PEDIATRIC) - ASU DC SPECIAL INSTRUCTIONSFT
Expect abdominal cramping/pain and spotting for the next weeks. Take ibuprofen and Tylenol for cramping. Use a pad as needed. Call your physician or go to the emergency room if you experience any of the following: heavy vaginal bleeding (soaking more than 2 pads in 1 hour for 2 hours), fever, chills, nausea, vomiting, or pain that is not controlled by medication. Follow-up with Dr. Michele in two weeks.

## 2020-09-22 NOTE — ASU DISCHARGE PLAN (ADULT/PEDIATRIC) - CALL YOUR DOCTOR IF YOU HAVE ANY OF THE FOLLOWING:
Nausea and vomiting that does not stop/Unable to urinate/Inability to tolerate liquids or foods/Pain not relieved by Medications/Fever greater than (need to indicate Fahrenheit or Celsius)/Bleeding that does not stop

## 2020-09-22 NOTE — BRIEF OPERATIVE NOTE - NSICDXBRIEFPREOP_GEN_ALL_CORE_FT
PRE-OP DIAGNOSIS:  Known or suspected fetal anomaly affecting antepartum care of mother 22-Sep-2020 11:03:59  Lexii Lema   PRE-OP DIAGNOSIS:  Known or suspected fetal anomaly affecting antepartum care of mother 22-Sep-2020 11:03:59 1. IUP @ 12 5/7 weeks  2. trisomy 18 Diamant, Lexii

## 2020-09-22 NOTE — ASU DISCHARGE PLAN (ADULT/PEDIATRIC) - ACTIVITY LEVEL
No tub baths/No tampons/No intercourse/No douching/Nothing per vagina Nothing per vagina/No douching/No intercourse/No heavy lifting/No tampons/No tub baths

## 2020-09-22 NOTE — BRIEF OPERATIVE NOTE - NSICDXBRIEFPROCEDURE_GEN_ALL_CORE_FT
PROCEDURES:  Dilation and curettage, uterus, with US guidance 22-Sep-2020 11:03:16  Lexii Lema  Exam under anesthesia, pelvic 22-Sep-2020 11:03:00  Lexii Lema   PROCEDURES:  Induction of  by dilation and curettage 29-Sep-2020 10:32:14 1. examination under anesthesia  2. dilation and curettage under ultrasound guidance Courtney Michele  Exam under anesthesia, pelvic 22-Sep-2020 11:03:00  Lexii Lema

## 2020-09-22 NOTE — BRIEF OPERATIVE NOTE - OPERATION/FINDINGS
intrauterine gestation at 12w5d visualized on ultrasoun  exam under anesthesia revealed approximately 12 week size anteverted uterus  entire uterine contents evacuated  thin endometrial stripe at conclusion visualized on sonogram  Blood Type O+: Rhogam not required intrauterine gestation at 12w5d visualized on ultrasoun  exam under anesthesia revealed approximately 12 week size anteverted uterus  entire uterine contents evacuated, all fetal parts accounted for  thin endometrial stripe at conclusion visualized on sonogram  Blood Type O+: Rhogam not required anteverted uterus approximately 12 weeks size. fetus and placenta AGA, all fetal parts accounted for. BT O+

## 2020-09-23 DIAGNOSIS — Z3A.11 11 WEEKS GESTATION OF PREGNANCY: ICD-10-CM

## 2020-09-23 DIAGNOSIS — O35.8XX0 MATERNAL CARE FOR OTHER (SUSPECTED) FETAL ABNORMALITY AND DAMAGE, NOT APPLICABLE OR UNSPECIFIED: ICD-10-CM

## 2020-09-23 DIAGNOSIS — O36.80X0 PREGNANCY WITH INCONCLUSIVE FETAL VIABILITY, NOT APPLICABLE OR UNSPECIFIED: ICD-10-CM

## 2020-09-23 LAB
C TRACH RRNA SPEC QL NAA+PROBE: NOT DETECTED
N GONORRHOEA RRNA SPEC QL NAA+PROBE: NOT DETECTED
SOURCE AMPLIFICATION: NORMAL

## 2020-09-24 LAB
CHROM ANALY OVERALL INTERP SPEC-IMP: SIGNIFICANT CHANGE UP
NYS PRENATAL DIAGNOSIS FOLLOW-UP QUESTIONNAIRE: SIGNIFICANT CHANGE UP

## 2020-09-29 LAB — SURGICAL PATHOLOGY STUDY: SIGNIFICANT CHANGE UP

## 2020-10-07 ENCOUNTER — APPOINTMENT (OUTPATIENT)
Dept: OBGYN | Facility: CLINIC | Age: 35
End: 2020-10-07
Payer: COMMERCIAL

## 2020-10-07 PROCEDURE — 99212 OFFICE O/P EST SF 10 MIN: CPT | Mod: 95

## 2020-10-07 NOTE — HISTORY OF PRESENT ILLNESS
[FreeTextEntry1] : This visist was provided via Telehealth using real-time 2-way audio visual technology. The patient MAYANK CAMILO was located at home [phone] [place] [address], [place] [address] at the time of the visit. The provider Mary Rehman was located at the medical office located in [place], [place] at the time of the visit. The patient MAYANK CAMILO and provider participated in the Telehealth encounter. Verbal consent for Telehealth services was given on Oct 07, 2020 by the patient MAYANK CAMILO.\par \par 34 yo  s/p D+C on  at 12 5/7 weeks due to Fetal anomaly (trisomy 18). Pt has minimal bleeding and some cramping. Pt has had intermittent headaches. Otherwise pt doing well, ambulating, tolerating PO, voiding and ambulating.

## 2020-10-07 NOTE — PLAN
[FreeTextEntry1] : 34 yo  s/p D+E on  at 12 5/7 weeks due to Fetal anomaly (trisomy 18)      \par \par 1.Dilation and Evacuation\par -Patient is recovering well.  No signs/symptoms of infection. \par -Reviewed pathology from procedure; discussed genetic implications of diagnosis as previously discussed by genetic counselor and low risk of recurrence in future pregnancies. \par -Reviewed that first period may be heavier than normal. \par -Patient is cleared to return to all physical activities\par \par 2.Contraception\par - Reviewed contraceptive options.\par - Patient desires pregnancy, discussed waiting at least one menses prior to attempting to conceive\par \par 3.  Psych\par - Discussed normal grieving process, reviewed support people\par \par 4. Follow-up\par - Patient referred back to her primary Ob-gyn, Dr. Serrano for routine care\par - Copies of medical records to be forwarded to Dr. Serrano\par \par Mary Rehman MD

## 2020-10-23 ENCOUNTER — TRANSCRIPTION ENCOUNTER (OUTPATIENT)
Age: 35
End: 2020-10-23

## 2020-12-21 PROBLEM — Z87.09 HISTORY OF ACUTE SINUSITIS: Status: RESOLVED | Noted: 2019-04-26 | Resolved: 2020-12-21

## 2020-12-22 ENCOUNTER — APPOINTMENT (OUTPATIENT)
Dept: DISASTER EMERGENCY | Facility: CLINIC | Age: 35
End: 2020-12-22

## 2020-12-24 LAB — SARS-COV-2 N GENE NPH QL NAA+PROBE: NOT DETECTED

## 2021-03-01 ENCOUNTER — OUTPATIENT (OUTPATIENT)
Dept: OUTPATIENT SERVICES | Facility: HOSPITAL | Age: 36
LOS: 1 days | End: 2021-03-01
Payer: COMMERCIAL

## 2021-03-01 VITALS
TEMPERATURE: 98 F | DIASTOLIC BLOOD PRESSURE: 77 MMHG | HEART RATE: 58 BPM | SYSTOLIC BLOOD PRESSURE: 131 MMHG | RESPIRATION RATE: 16 BRPM | WEIGHT: 121.92 LBS | OXYGEN SATURATION: 99 % | HEIGHT: 64 IN

## 2021-03-01 DIAGNOSIS — C43.9 MALIGNANT MELANOMA OF SKIN, UNSPECIFIED: Chronic | ICD-10-CM

## 2021-03-01 DIAGNOSIS — Z98.890 OTHER SPECIFIED POSTPROCEDURAL STATES: Chronic | ICD-10-CM

## 2021-03-01 DIAGNOSIS — Z01.818 ENCOUNTER FOR OTHER PREPROCEDURAL EXAMINATION: ICD-10-CM

## 2021-03-01 DIAGNOSIS — Z90.89 ACQUIRED ABSENCE OF OTHER ORGANS: Chronic | ICD-10-CM

## 2021-03-01 DIAGNOSIS — N84.0 POLYP OF CORPUS UTERI: ICD-10-CM

## 2021-03-01 DIAGNOSIS — Z11.52 ENCOUNTER FOR SCREENING FOR COVID-19: ICD-10-CM

## 2021-03-01 LAB
HCT VFR BLD CALC: 41.6 % — SIGNIFICANT CHANGE UP (ref 34.5–45)
HGB BLD-MCNC: 13.7 G/DL — SIGNIFICANT CHANGE UP (ref 11.5–15.5)
MCHC RBC-ENTMCNC: 28.9 PG — SIGNIFICANT CHANGE UP (ref 27–34)
MCHC RBC-ENTMCNC: 32.9 GM/DL — SIGNIFICANT CHANGE UP (ref 32–36)
MCV RBC AUTO: 87.8 FL — SIGNIFICANT CHANGE UP (ref 80–100)
NRBC # BLD: 0 /100 WBCS — SIGNIFICANT CHANGE UP (ref 0–0)
PLATELET # BLD AUTO: 290 K/UL — SIGNIFICANT CHANGE UP (ref 150–400)
RBC # BLD: 4.74 M/UL — SIGNIFICANT CHANGE UP (ref 3.8–5.2)
RBC # FLD: 12.7 % — SIGNIFICANT CHANGE UP (ref 10.3–14.5)
SARS-COV-2 RNA SPEC QL NAA+PROBE: DETECTED
WBC # BLD: 9.19 K/UL — SIGNIFICANT CHANGE UP (ref 3.8–10.5)
WBC # FLD AUTO: 9.19 K/UL — SIGNIFICANT CHANGE UP (ref 3.8–10.5)

## 2021-03-01 PROCEDURE — 85027 COMPLETE CBC AUTOMATED: CPT

## 2021-03-01 PROCEDURE — U0005: CPT

## 2021-03-01 PROCEDURE — C9803: CPT

## 2021-03-01 PROCEDURE — U0003: CPT

## 2021-03-01 PROCEDURE — G0463: CPT

## 2021-03-01 RX ORDER — LIDOCAINE HCL 20 MG/ML
0.2 VIAL (ML) INJECTION ONCE
Refills: 0 | Status: DISCONTINUED | OUTPATIENT
Start: 2021-04-01 | End: 2021-04-15

## 2021-03-01 RX ORDER — SODIUM CHLORIDE 9 MG/ML
3 INJECTION INTRAMUSCULAR; INTRAVENOUS; SUBCUTANEOUS EVERY 8 HOURS
Refills: 0 | Status: DISCONTINUED | OUTPATIENT
Start: 2021-04-01 | End: 2021-04-15

## 2021-03-01 NOTE — H&P PST ADULT - NSICDXPASTMEDICALHX_GEN_ALL_CORE_FT
PAST MEDICAL HISTORY:  Uterine polyp      PAST MEDICAL HISTORY:  Malignant melanoma, unspecified site hx melanoma on left chest    Uterine polyp

## 2021-03-01 NOTE — H&P PST ADULT - HISTORY OF PRESENT ILLNESS
35 year old female    ****[facility]  ****Denies any recent covid infections or exposure  35 year old female  s/p D&E for trisomy 2020 now with uterine adhesions planned for D&C, operative hysteroscopy w/Myosure on 3/3/2021.   ***Patient changed her last Name from Constanza to Hubert. ( merged new MRN [id number] to [id number])  ****Covid test today @ Replaced by [facility]  ****Denies any recent covid infections or exposure  35 year old female  s/p D&E for trisomy 2020 now with uterine adhesions planned for D&C, operative hysteroscopy w/Myosure on 3/3/2021.   ***Patient changed her last Name from Constanza to Hubert.   **3/24/2021 addendum:   covid test positive on 3/1, pt c/o fever, myalgia, abd cramping, quarantined, did not required hospitalization nor oxygen supplement.  now presents to PST with above mentioned procedure re-scheduled on .    **pt stated after she recovered from covid and went back to work, a few days ago, she started to have nasal congestion, headache, unproductive cough, pt has h/o seasonal allergies, pt was evaluated by pulmonologist 35 year old female  s/p D&E for trisomy 2020 now with uterine adhesions planned for D&C, operative hysteroscopy w/Myosure on 3/3/2021.   ***Patient changed her last Name from Constanza to Hubert.   **3/24/2021 addendum:   covid test positive on 3/1, pt c/o fever, myalgia, abd cramping, quarantined, did not required hospitalization nor oxygen supplement.  now presents to PST with above mentioned procedure re-scheduled on .    **pt stated after she recovered from covid and went back to work, a few days ago, she started to have nasal congestion, headache, unproductive cough, pt has h/o seasonal allergies, pt was evaluated by pulmonologist (see note on chart), started on symbicort.  advised pt to see PCP for preop evaluation.**  covid test scheduled on 3/29 at [facility]. denies travel history.

## 2021-03-11 ENCOUNTER — APPOINTMENT (OUTPATIENT)
Dept: INTERNAL MEDICINE | Facility: CLINIC | Age: 36
End: 2021-03-11
Payer: COMMERCIAL

## 2021-03-11 PROBLEM — N84.0 POLYP OF CORPUS UTERI: Chronic | Status: ACTIVE | Noted: 2021-03-01

## 2021-03-11 PROCEDURE — 99213 OFFICE O/P EST LOW 20 MIN: CPT | Mod: 95

## 2021-03-11 NOTE — PHYSICAL EXAM
[No Acute Distress] : no acute distress [Well-Appearing] : well-appearing [Normal Voice/Communication] : normal voice/communication [No Respiratory Distress] : no respiratory distress  [No Accessory Muscle Use] : no accessory muscle use [Alert and Oriented x3] : oriented to person, place, and time

## 2021-03-11 NOTE — PLAN
[FreeTextEntry1] : continue with conservative management for now \par BRAT diet, fluids \par if pt continues to spike temps, will consider imaging and labs to r/o additional cause \par

## 2021-03-11 NOTE — HISTORY OF PRESENT ILLNESS
[Home] : at home, [unfilled] , at the time of the visit. [Medical Office: ([facility])___] : at the medical office located in  [Verbal consent obtained from patient] : the patient, [unfilled] [FreeTextEntry8] : Pt diagnosed with covid on 3/1, developed symptoms the night prior which included abdominal cramping, diarrhea, headaches, body aches, and fevers up to 102. Pt began to feel well but then spiked temps again a few days ago and stomach still feels unwell. Pt also feels like chest is tight but does not feel SOB. She was prescribed symbicort by a pulmonologist. Pt is afebrile today and has not taken anything for the fever since yesterday. Her stomach is still uncomfortable but has not had diarrhea as of yet today.

## 2021-03-21 ENCOUNTER — TRANSCRIPTION ENCOUNTER (OUTPATIENT)
Age: 36
End: 2021-03-21

## 2021-03-23 ENCOUNTER — NON-APPOINTMENT (OUTPATIENT)
Age: 36
End: 2021-03-23

## 2021-03-23 DIAGNOSIS — R05 COUGH: ICD-10-CM

## 2021-03-24 ENCOUNTER — OUTPATIENT (OUTPATIENT)
Dept: OUTPATIENT SERVICES | Facility: HOSPITAL | Age: 36
LOS: 1 days | End: 2021-03-24

## 2021-03-24 VITALS
WEIGHT: 121.92 LBS | HEIGHT: 64 IN | OXYGEN SATURATION: 98 % | SYSTOLIC BLOOD PRESSURE: 109 MMHG | TEMPERATURE: 98 F | HEART RATE: 80 BPM | DIASTOLIC BLOOD PRESSURE: 79 MMHG | RESPIRATION RATE: 14 BRPM

## 2021-03-24 DIAGNOSIS — Z90.89 ACQUIRED ABSENCE OF OTHER ORGANS: Chronic | ICD-10-CM

## 2021-03-24 DIAGNOSIS — Z98.890 OTHER SPECIFIED POSTPROCEDURAL STATES: Chronic | ICD-10-CM

## 2021-03-24 DIAGNOSIS — N85.6 INTRAUTERINE SYNECHIAE: ICD-10-CM

## 2021-03-24 DIAGNOSIS — Z01.818 ENCOUNTER FOR OTHER PREPROCEDURAL EXAMINATION: ICD-10-CM

## 2021-03-24 DIAGNOSIS — C43.9 MALIGNANT MELANOMA OF SKIN, UNSPECIFIED: Chronic | ICD-10-CM

## 2021-03-24 RX ORDER — SODIUM CHLORIDE 9 MG/ML
3 INJECTION INTRAMUSCULAR; INTRAVENOUS; SUBCUTANEOUS EVERY 8 HOURS
Refills: 0 | Status: DISCONTINUED | OUTPATIENT
Start: 2021-04-01 | End: 2021-04-15

## 2021-03-24 RX ORDER — LIDOCAINE HCL 20 MG/ML
0.2 VIAL (ML) INJECTION ONCE
Refills: 0 | Status: DISCONTINUED | OUTPATIENT
Start: 2021-04-01 | End: 2021-04-15

## 2021-03-24 NOTE — H&P PST ADULT - NSICDXPASTMEDICALHX_GEN_ALL_CORE_FT
PAST MEDICAL HISTORY:  Malignant melanoma, unspecified site hx melanoma on left chest    Uterine polyp      PAST MEDICAL HISTORY:  COVID-19 tested positive on 3/1/2021, c/o fever, abd cramp, no hospitalization, no oxygen supplement.    Malignant melanoma, unspecified site hx melanoma on left chest    Uterine polyp

## 2021-03-24 NOTE — H&P PST ADULT - ATTENDING COMMENTS
Agree with above  Plan for D+c hysteroscopy for uterine adhesions  All R/B/A discussed    jasson munoz

## 2021-03-24 NOTE — H&P PST ADULT - HISTORY OF PRESENT ILLNESS
35 year old female  s/p D&E for trisomy 2020 now with uterine adhesions planned for D&C, operative hysteroscopy w/Myosure on 3/3/2021.   ***Patient changed her last Name from Constanza to Hubert. ( merged new MRN [id number] to [id number])  ****Covid test today @ [facility]  ****Denies any recent covid infections or exposure  35 year old female  s/p D&E for trisomy 2020 now with uterine adhesions planned for D&C, operative hysteroscopy w/Myosure on 3/3/2021.   ***Patient changed her last Name from Constanza to Hubert. ( merged new MRN [id number] to [id number])    ***3/24/2021 addendum:  pt tested positive for covid 3/1/2021- fever, myalgia, abd cramp, did not require hospitalization nor oxygen supplement, quarantined.  now presents to PST today with above mentioned procedure re-scheduled to .  Pt stated she recovered from covid, returned to work.  However, a few days ago, she started to have nasal congestion, HA, unproductive cough, evaluated by pulmonologist, started on symbicort.  advised pt to see PCP for preop evalution***    covid test scheduled on 3/29 at [facility].

## 2021-03-24 NOTE — H&P PST ADULT - NSICDXPASTSURGICALHX_GEN_ALL_CORE_FT
PAST SURGICAL HISTORY:  History of augmentation of both breasts implants placed 11 years ago----removed 6/2019    History of tonsillectomy     Melanoma of skin 13 years ago    S/P dilatation and curettage D&E 9/2020

## 2021-03-29 ENCOUNTER — OUTPATIENT (OUTPATIENT)
Dept: OUTPATIENT SERVICES | Facility: HOSPITAL | Age: 36
LOS: 1 days | End: 2021-03-29
Payer: COMMERCIAL

## 2021-03-29 DIAGNOSIS — Z11.52 ENCOUNTER FOR SCREENING FOR COVID-19: ICD-10-CM

## 2021-03-29 DIAGNOSIS — Z98.890 OTHER SPECIFIED POSTPROCEDURAL STATES: Chronic | ICD-10-CM

## 2021-03-29 DIAGNOSIS — Z90.89 ACQUIRED ABSENCE OF OTHER ORGANS: Chronic | ICD-10-CM

## 2021-03-29 DIAGNOSIS — C43.9 MALIGNANT MELANOMA OF SKIN, UNSPECIFIED: Chronic | ICD-10-CM

## 2021-03-29 LAB — SARS-COV-2 RNA SPEC QL NAA+PROBE: SIGNIFICANT CHANGE UP

## 2021-03-29 PROCEDURE — C9803: CPT

## 2021-03-29 PROCEDURE — U0005: CPT

## 2021-03-29 PROCEDURE — U0003: CPT

## 2021-03-30 ENCOUNTER — APPOINTMENT (OUTPATIENT)
Dept: INTERNAL MEDICINE | Facility: CLINIC | Age: 36
End: 2021-03-30
Payer: COMMERCIAL

## 2021-03-30 VITALS
OXYGEN SATURATION: 98 % | SYSTOLIC BLOOD PRESSURE: 110 MMHG | HEART RATE: 102 BPM | DIASTOLIC BLOOD PRESSURE: 66 MMHG | TEMPERATURE: 98.2 F | WEIGHT: 118 LBS | BODY MASS INDEX: 20.25 KG/M2 | RESPIRATION RATE: 14 BRPM

## 2021-03-30 DIAGNOSIS — Z01.818 ENCOUNTER FOR OTHER PREPROCEDURAL EXAMINATION: ICD-10-CM

## 2021-03-30 PROBLEM — U07.1 COVID-19: Chronic | Status: ACTIVE | Noted: 2021-03-24

## 2021-03-30 PROCEDURE — 99072 ADDL SUPL MATRL&STAF TM PHE: CPT

## 2021-03-30 PROCEDURE — 99214 OFFICE O/P EST MOD 30 MIN: CPT

## 2021-03-30 RX ORDER — IPRATROPIUM BROMIDE 42 UG/1
0.06 SPRAY NASAL 3 TIMES DAILY
Qty: 1 | Refills: 3 | Status: DISCONTINUED | COMMUNITY
Start: 2021-03-23 | End: 2021-03-30

## 2021-03-30 RX ORDER — BACILLUS COAGULANS/INULIN 1B-250 MG
CAPSULE ORAL
Refills: 0 | Status: DISCONTINUED | COMMUNITY
End: 2021-03-30

## 2021-03-30 RX ORDER — FEXOFENADINE HCL 60 MG
TABLET ORAL
Refills: 0 | Status: ACTIVE | COMMUNITY

## 2021-03-30 NOTE — PHYSICAL EXAM
[No Acute Distress] : no acute distress [Well-Appearing] : well-appearing [Normal Voice/Communication] : normal voice/communication [Normal Oropharynx] : the oropharynx was normal [Thyroid Normal, No Nodules] : the thyroid was normal and there were no nodules present [No Respiratory Distress] : no respiratory distress  [No Accessory Muscle Use] : no accessory muscle use [Clear to Auscultation] : lungs were clear to auscultation bilaterally [Normal Rate] : normal rate  [Regular Rhythm] : with a regular rhythm [No Murmur] : no murmur heard [No Focal Deficits] : no focal deficits [Alert and Oriented x3] : oriented to person, place, and time

## 2021-03-30 NOTE — HISTORY OF PRESENT ILLNESS
[No Pertinent Cardiac History] : no history of aortic stenosis, atrial fibrillation, coronary artery disease, recent myocardial infarction, or implantable device/pacemaker [No Pertinent Pulmonary History] : no history of asthma, COPD, sleep apnea, or smoking [No Adverse Anesthesia Reaction] : no adverse anesthesia reaction in self or family member [(Patient denies any chest pain, claudication, dyspnea on exertion, orthopnea, palpitations or syncope)] : Patient denies any chest pain, claudication, dyspnea on exertion, orthopnea, palpitations or syncope [Excellent (>10 METs)] : Excellent (>10 METs) [Chronic Anticoagulation] : no chronic anticoagulation [Chronic Kidney Disease] : no chronic kidney disease [Diabetes] : no diabetes [FreeTextEntry1] : D&C [FreeTextEntry2] : 4/1 [FreeTextEntry4] : Pt here for MCA. Had covid earlier this month, still feels fatigued but no SOB.  [FreeTextEntry3] : Dr. Mustafa

## 2021-03-31 ENCOUNTER — TRANSCRIPTION ENCOUNTER (OUTPATIENT)
Age: 36
End: 2021-03-31

## 2021-04-01 ENCOUNTER — RESULT REVIEW (OUTPATIENT)
Age: 36
End: 2021-04-01

## 2021-04-01 ENCOUNTER — OUTPATIENT (OUTPATIENT)
Dept: OUTPATIENT SERVICES | Facility: HOSPITAL | Age: 36
LOS: 1 days | End: 2021-04-01
Payer: COMMERCIAL

## 2021-04-01 VITALS
OXYGEN SATURATION: 100 % | RESPIRATION RATE: 18 BRPM | HEART RATE: 64 BPM | SYSTOLIC BLOOD PRESSURE: 91 MMHG | DIASTOLIC BLOOD PRESSURE: 62 MMHG | HEIGHT: 64 IN | WEIGHT: 121.92 LBS | TEMPERATURE: 98 F

## 2021-04-01 VITALS
SYSTOLIC BLOOD PRESSURE: 100 MMHG | DIASTOLIC BLOOD PRESSURE: 56 MMHG | HEART RATE: 61 BPM | OXYGEN SATURATION: 100 % | RESPIRATION RATE: 18 BRPM

## 2021-04-01 DIAGNOSIS — Z01.818 ENCOUNTER FOR OTHER PREPROCEDURAL EXAMINATION: ICD-10-CM

## 2021-04-01 DIAGNOSIS — Z90.89 ACQUIRED ABSENCE OF OTHER ORGANS: Chronic | ICD-10-CM

## 2021-04-01 DIAGNOSIS — C43.9 MALIGNANT MELANOMA OF SKIN, UNSPECIFIED: Chronic | ICD-10-CM

## 2021-04-01 DIAGNOSIS — N84.0 POLYP OF CORPUS UTERI: ICD-10-CM

## 2021-04-01 DIAGNOSIS — N85.6 INTRAUTERINE SYNECHIAE: ICD-10-CM

## 2021-04-01 DIAGNOSIS — Z98.890 OTHER SPECIFIED POSTPROCEDURAL STATES: Chronic | ICD-10-CM

## 2021-04-01 PROCEDURE — 58559 HYSTEROSCOPY LYSIS: CPT

## 2021-04-01 PROCEDURE — 88305 TISSUE EXAM BY PATHOLOGIST: CPT

## 2021-04-01 PROCEDURE — 88305 TISSUE EXAM BY PATHOLOGIST: CPT | Mod: 26

## 2021-04-01 RX ORDER — ALBUTEROL 90 UG/1
2 AEROSOL, METERED ORAL
Qty: 0 | Refills: 0 | DISCHARGE

## 2021-04-01 RX ORDER — IBUPROFEN 200 MG
1 TABLET ORAL
Qty: 0 | Refills: 0 | DISCHARGE

## 2021-04-01 RX ORDER — CHLORPHEN/PSEUDOEPH/IBUPROFEN 2-30-200MG
1 TABLET ORAL
Qty: 0 | Refills: 0 | DISCHARGE

## 2021-04-01 RX ORDER — ACETAMINOPHEN 500 MG
2 TABLET ORAL
Qty: 0 | Refills: 0 | DISCHARGE

## 2021-04-01 RX ORDER — FENTANYL CITRATE 50 UG/ML
25 INJECTION INTRAVENOUS
Refills: 0 | Status: DISCONTINUED | OUTPATIENT
Start: 2021-04-01 | End: 2021-04-01

## 2021-04-01 RX ORDER — BUDESONIDE AND FORMOTEROL FUMARATE DIHYDRATE 160; 4.5 UG/1; UG/1
2 AEROSOL RESPIRATORY (INHALATION)
Qty: 0 | Refills: 0 | DISCHARGE

## 2021-04-01 NOTE — ASU PATIENT PROFILE, ADULT - PMH
COVID-19  tested positive on 3/1/2021, c/o fever, abd cramp, no hospitalization, no oxygen supplement.  Malignant melanoma, unspecified site  hx melanoma on left chest  Uterine polyp
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
## 2021-04-01 NOTE — BRIEF OPERATIVE NOTE - OPERATION/FINDINGS
EUA revealed anteverted uterus, ovaries nonpalpable b/l  Cervix gently dilated to 7mm  hysteroscope revealed adhesive tissue posteriorly removed with grasped   Excellent hemostasis noted, count correct x2

## 2021-04-01 NOTE — ASU PATIENT PROFILE, ADULT - PSH
History of augmentation of both breasts  implants placed 11 years ago----removed 6/2019  History of tonsillectomy    Melanoma of skin  13 years ago  S/P dilatation and curettage  D&E 9/2020

## 2021-04-01 NOTE — ASU DISCHARGE PLAN (ADULT/PEDIATRIC) - CARE PROVIDER_API CALL
Sharon Mustafa)  [facility]  [address] [place] [address], [address]  [place] [address]  Phone: [phone]  Fax: [phone]  Follow Up Time:

## 2021-04-01 NOTE — BRIEF OPERATIVE NOTE - NSICDXBRIEFPROCEDURE_GEN_ALL_CORE_FT
PROCEDURES:  Dilation and curettage, uterus 01-Apr-2021 07:40:27  Farida Newman  Hysteroscopic lysis, intrauterine adhesions 01-Apr-2021 07:40:36  Farida Newman

## 2021-09-02 ENCOUNTER — APPOINTMENT (OUTPATIENT)
Dept: RADIOLOGY | Facility: IMAGING CENTER | Age: 36
End: 2021-09-02
Payer: COMMERCIAL

## 2021-09-02 ENCOUNTER — OUTPATIENT (OUTPATIENT)
Dept: OUTPATIENT SERVICES | Facility: HOSPITAL | Age: 36
LOS: 1 days | End: 2021-09-02
Payer: COMMERCIAL

## 2021-09-02 DIAGNOSIS — Z98.890 OTHER SPECIFIED POSTPROCEDURAL STATES: Chronic | ICD-10-CM

## 2021-09-02 DIAGNOSIS — Z90.89 ACQUIRED ABSENCE OF OTHER ORGANS: Chronic | ICD-10-CM

## 2021-09-02 DIAGNOSIS — C43.59 MALIGNANT MELANOMA OF OTHER PART OF TRUNK: ICD-10-CM

## 2021-09-02 DIAGNOSIS — C43.9 MALIGNANT MELANOMA OF SKIN, UNSPECIFIED: Chronic | ICD-10-CM

## 2021-09-02 PROCEDURE — 71046 X-RAY EXAM CHEST 2 VIEWS: CPT | Mod: 26

## 2021-09-02 PROCEDURE — 71046 X-RAY EXAM CHEST 2 VIEWS: CPT

## 2021-10-29 ENCOUNTER — APPOINTMENT (OUTPATIENT)
Dept: DERMATOLOGY | Facility: CLINIC | Age: 36
End: 2021-10-29
Payer: COMMERCIAL

## 2021-10-29 ENCOUNTER — NON-APPOINTMENT (OUTPATIENT)
Age: 36
End: 2021-10-29

## 2021-10-29 VITALS — BODY MASS INDEX: 21.46 KG/M2 | WEIGHT: 125 LBS

## 2021-10-29 DIAGNOSIS — L21.9 SEBORRHEIC DERMATITIS, UNSPECIFIED: ICD-10-CM

## 2021-10-29 DIAGNOSIS — Z12.83 ENCOUNTER FOR SCREENING FOR MALIGNANT NEOPLASM OF SKIN: ICD-10-CM

## 2021-10-29 PROCEDURE — 99204 OFFICE O/P NEW MOD 45 MIN: CPT

## 2021-10-29 RX ORDER — KETOCONAZOLE 20 MG/G
2 CREAM TOPICAL
Qty: 1 | Refills: 3 | Status: ACTIVE | COMMUNITY
Start: 2021-10-29 | End: 1900-01-01

## 2021-11-11 ENCOUNTER — APPOINTMENT (OUTPATIENT)
Dept: HUMAN REPRODUCTION | Facility: CLINIC | Age: 36
End: 2021-11-11
Payer: COMMERCIAL

## 2021-11-11 PROCEDURE — 99215 OFFICE O/P EST HI 40 MIN: CPT

## 2021-11-12 ENCOUNTER — APPOINTMENT (OUTPATIENT)
Dept: HUMAN REPRODUCTION | Facility: CLINIC | Age: 36
End: 2021-11-12
Payer: COMMERCIAL

## 2021-11-12 ENCOUNTER — OUTPATIENT (OUTPATIENT)
Dept: OUTPATIENT SERVICES | Facility: HOSPITAL | Age: 36
LOS: 1 days | End: 2021-11-12
Payer: COMMERCIAL

## 2021-11-12 ENCOUNTER — APPOINTMENT (OUTPATIENT)
Dept: RADIOLOGY | Facility: HOSPITAL | Age: 36
End: 2021-11-12

## 2021-11-12 ENCOUNTER — RESULT REVIEW (OUTPATIENT)
Age: 36
End: 2021-11-12

## 2021-11-12 DIAGNOSIS — C43.9 MALIGNANT MELANOMA OF SKIN, UNSPECIFIED: Chronic | ICD-10-CM

## 2021-11-12 DIAGNOSIS — Z98.890 OTHER SPECIFIED POSTPROCEDURAL STATES: Chronic | ICD-10-CM

## 2021-11-12 DIAGNOSIS — Z90.89 ACQUIRED ABSENCE OF OTHER ORGANS: Chronic | ICD-10-CM

## 2021-11-12 DIAGNOSIS — Z31.41 ENCOUNTER FOR FERTILITY TESTING: ICD-10-CM

## 2021-11-12 PROCEDURE — 58340 CATHETER FOR HYSTEROGRAPHY: CPT

## 2021-11-12 PROCEDURE — 74740 X-RAY FEMALE GENITAL TRACT: CPT

## 2021-11-12 PROCEDURE — 99214 OFFICE O/P EST MOD 30 MIN: CPT | Mod: 25

## 2021-11-12 PROCEDURE — 58340 CATHETER FOR HYSTEROGRAPHY: CPT | Mod: 59

## 2021-11-17 ENCOUNTER — APPOINTMENT (OUTPATIENT)
Dept: HUMAN REPRODUCTION | Facility: CLINIC | Age: 36
End: 2021-11-17
Payer: COMMERCIAL

## 2021-11-17 PROCEDURE — 76831 ECHO EXAM UTERUS: CPT

## 2021-11-17 PROCEDURE — 58340 CATHETER FOR HYSTEROGRAPHY: CPT

## 2021-11-24 ENCOUNTER — APPOINTMENT (OUTPATIENT)
Dept: HUMAN REPRODUCTION | Facility: CLINIC | Age: 36
End: 2021-11-24

## 2021-11-24 ENCOUNTER — APPOINTMENT (OUTPATIENT)
Dept: HUMAN REPRODUCTION | Facility: CLINIC | Age: 36
End: 2021-11-24
Payer: COMMERCIAL

## 2021-11-24 PROCEDURE — XXXXX: CPT

## 2021-12-09 ENCOUNTER — APPOINTMENT (OUTPATIENT)
Dept: HUMAN REPRODUCTION | Facility: CLINIC | Age: 36
End: 2021-12-09
Payer: COMMERCIAL

## 2021-12-09 PROCEDURE — 36415 COLL VENOUS BLD VENIPUNCTURE: CPT

## 2021-12-09 PROCEDURE — 76830 TRANSVAGINAL US NON-OB: CPT

## 2021-12-09 PROCEDURE — 99213 OFFICE O/P EST LOW 20 MIN: CPT | Mod: 25

## 2021-12-14 ENCOUNTER — APPOINTMENT (OUTPATIENT)
Dept: HUMAN REPRODUCTION | Facility: CLINIC | Age: 36
End: 2021-12-14

## 2021-12-16 ENCOUNTER — APPOINTMENT (OUTPATIENT)
Dept: HUMAN REPRODUCTION | Facility: CLINIC | Age: 36
End: 2021-12-16
Payer: COMMERCIAL

## 2021-12-16 PROCEDURE — 99213 OFFICE O/P EST LOW 20 MIN: CPT | Mod: 25

## 2021-12-16 PROCEDURE — 36415 COLL VENOUS BLD VENIPUNCTURE: CPT

## 2021-12-16 PROCEDURE — 76830 TRANSVAGINAL US NON-OB: CPT

## 2021-12-17 ENCOUNTER — APPOINTMENT (OUTPATIENT)
Dept: HUMAN REPRODUCTION | Facility: CLINIC | Age: 36
End: 2021-12-17
Payer: COMMERCIAL

## 2021-12-17 PROCEDURE — 99213 OFFICE O/P EST LOW 20 MIN: CPT | Mod: 25

## 2021-12-17 PROCEDURE — 58322 ARTIFICIAL INSEMINATION: CPT

## 2021-12-17 PROCEDURE — 89260 SPERM ISOLATION SIMPLE: CPT

## 2021-12-20 ENCOUNTER — APPOINTMENT (OUTPATIENT)
Dept: DISASTER EMERGENCY | Facility: CLINIC | Age: 36
End: 2021-12-20

## 2021-12-30 ENCOUNTER — APPOINTMENT (OUTPATIENT)
Dept: HUMAN REPRODUCTION | Facility: CLINIC | Age: 36
End: 2021-12-30
Payer: COMMERCIAL

## 2021-12-30 PROCEDURE — 76830 TRANSVAGINAL US NON-OB: CPT

## 2021-12-30 PROCEDURE — 99213 OFFICE O/P EST LOW 20 MIN: CPT | Mod: 25

## 2021-12-30 PROCEDURE — 36415 COLL VENOUS BLD VENIPUNCTURE: CPT

## 2022-01-03 ENCOUNTER — APPOINTMENT (OUTPATIENT)
Dept: HUMAN REPRODUCTION | Facility: CLINIC | Age: 37
End: 2022-01-03
Payer: COMMERCIAL

## 2022-01-03 PROCEDURE — 36415 COLL VENOUS BLD VENIPUNCTURE: CPT

## 2022-01-10 ENCOUNTER — APPOINTMENT (OUTPATIENT)
Dept: HUMAN REPRODUCTION | Facility: CLINIC | Age: 37
End: 2022-01-10
Payer: COMMERCIAL

## 2022-01-10 PROCEDURE — 76817 TRANSVAGINAL US OBSTETRIC: CPT

## 2022-01-10 PROCEDURE — 99213 OFFICE O/P EST LOW 20 MIN: CPT | Mod: 25

## 2022-01-10 PROCEDURE — 36415 COLL VENOUS BLD VENIPUNCTURE: CPT

## 2022-01-18 ENCOUNTER — APPOINTMENT (OUTPATIENT)
Dept: HUMAN REPRODUCTION | Facility: CLINIC | Age: 37
End: 2022-01-18
Payer: COMMERCIAL

## 2022-01-18 PROCEDURE — 76817 TRANSVAGINAL US OBSTETRIC: CPT

## 2022-01-18 PROCEDURE — 36415 COLL VENOUS BLD VENIPUNCTURE: CPT

## 2022-01-18 PROCEDURE — 99213 OFFICE O/P EST LOW 20 MIN: CPT | Mod: 25

## 2022-01-31 ENCOUNTER — RESULT REVIEW (OUTPATIENT)
Age: 37
End: 2022-01-31

## 2022-02-28 ENCOUNTER — EMERGENCY (EMERGENCY)
Facility: HOSPITAL | Age: 37
LOS: 1 days | Discharge: HOME | End: 2022-02-28
Attending: EMERGENCY MEDICINE | Admitting: EMERGENCY MEDICINE
Payer: COMMERCIAL

## 2022-02-28 VITALS
HEART RATE: 91 BPM | HEIGHT: 64 IN | SYSTOLIC BLOOD PRESSURE: 162 MMHG | RESPIRATION RATE: 18 BRPM | WEIGHT: 126.99 LBS | TEMPERATURE: 98 F | DIASTOLIC BLOOD PRESSURE: 81 MMHG | OXYGEN SATURATION: 100 %

## 2022-02-28 VITALS — TEMPERATURE: 98 F | HEART RATE: 90 BPM | DIASTOLIC BLOOD PRESSURE: 73 MMHG | SYSTOLIC BLOOD PRESSURE: 103 MMHG

## 2022-02-28 DIAGNOSIS — C43.9 MALIGNANT MELANOMA OF SKIN, UNSPECIFIED: Chronic | ICD-10-CM

## 2022-02-28 DIAGNOSIS — Z98.890 OTHER SPECIFIED POSTPROCEDURAL STATES: Chronic | ICD-10-CM

## 2022-02-28 DIAGNOSIS — Z90.89 ACQUIRED ABSENCE OF OTHER ORGANS: Chronic | ICD-10-CM

## 2022-02-28 LAB
ALBUMIN SERPL ELPH-MCNC: 4.5 G/DL — SIGNIFICANT CHANGE UP (ref 3.3–5)
ALP SERPL-CCNC: 39 U/L — LOW (ref 40–120)
ALT FLD-CCNC: 9 U/L — LOW (ref 10–45)
ANION GAP SERPL CALC-SCNC: 13 MMOL/L — SIGNIFICANT CHANGE UP (ref 5–17)
APPEARANCE UR: CLEAR — SIGNIFICANT CHANGE UP
AST SERPL-CCNC: 11 U/L — SIGNIFICANT CHANGE UP (ref 10–40)
BACTERIA # UR AUTO: NEGATIVE — SIGNIFICANT CHANGE UP
BASOPHILS # BLD AUTO: 0.04 K/UL — SIGNIFICANT CHANGE UP (ref 0–0.2)
BASOPHILS NFR BLD AUTO: 0.4 % — SIGNIFICANT CHANGE UP (ref 0–2)
BILIRUB SERPL-MCNC: 0.2 MG/DL — SIGNIFICANT CHANGE UP (ref 0.2–1.2)
BILIRUB UR-MCNC: NEGATIVE — SIGNIFICANT CHANGE UP
BUN SERPL-MCNC: 12 MG/DL — SIGNIFICANT CHANGE UP (ref 7–23)
CALCIUM SERPL-MCNC: 9.5 MG/DL — SIGNIFICANT CHANGE UP (ref 8.4–10.5)
CHLORIDE SERPL-SCNC: 102 MMOL/L — SIGNIFICANT CHANGE UP (ref 96–108)
CO2 SERPL-SCNC: 22 MMOL/L — SIGNIFICANT CHANGE UP (ref 22–31)
COLOR SPEC: SIGNIFICANT CHANGE UP
CREAT SERPL-MCNC: 0.6 MG/DL — SIGNIFICANT CHANGE UP (ref 0.5–1.3)
DIFF PNL FLD: ABNORMAL
EOSINOPHIL # BLD AUTO: 0.21 K/UL — SIGNIFICANT CHANGE UP (ref 0–0.5)
EOSINOPHIL NFR BLD AUTO: 2 % — SIGNIFICANT CHANGE UP (ref 0–6)
EPI CELLS # UR: 1 /HPF — SIGNIFICANT CHANGE UP
GLUCOSE SERPL-MCNC: 95 MG/DL — SIGNIFICANT CHANGE UP (ref 70–99)
GLUCOSE UR QL: NEGATIVE — SIGNIFICANT CHANGE UP
HCG SERPL-ACNC: HIGH MIU/ML
HCT VFR BLD CALC: 41.2 % — SIGNIFICANT CHANGE UP (ref 34.5–45)
HGB BLD-MCNC: 14.1 G/DL — SIGNIFICANT CHANGE UP (ref 11.5–15.5)
HYALINE CASTS # UR AUTO: 1 /LPF — SIGNIFICANT CHANGE UP (ref 0–2)
IMM GRANULOCYTES NFR BLD AUTO: 0.3 % — SIGNIFICANT CHANGE UP (ref 0–1.5)
KETONES UR-MCNC: NEGATIVE — SIGNIFICANT CHANGE UP
LEUKOCYTE ESTERASE UR-ACNC: NEGATIVE — SIGNIFICANT CHANGE UP
LYMPHOCYTES # BLD AUTO: 3.37 K/UL — HIGH (ref 1–3.3)
LYMPHOCYTES # BLD AUTO: 32.3 % — SIGNIFICANT CHANGE UP (ref 13–44)
MCHC RBC-ENTMCNC: 29.4 PG — SIGNIFICANT CHANGE UP (ref 27–34)
MCHC RBC-ENTMCNC: 34.2 GM/DL — SIGNIFICANT CHANGE UP (ref 32–36)
MCV RBC AUTO: 85.8 FL — SIGNIFICANT CHANGE UP (ref 80–100)
MONOCYTES # BLD AUTO: 0.74 K/UL — SIGNIFICANT CHANGE UP (ref 0–0.9)
MONOCYTES NFR BLD AUTO: 7.1 % — SIGNIFICANT CHANGE UP (ref 2–14)
NEUTROPHILS # BLD AUTO: 6.04 K/UL — SIGNIFICANT CHANGE UP (ref 1.8–7.4)
NEUTROPHILS NFR BLD AUTO: 57.9 % — SIGNIFICANT CHANGE UP (ref 43–77)
NITRITE UR-MCNC: NEGATIVE — SIGNIFICANT CHANGE UP
NRBC # BLD: 0 /100 WBCS — SIGNIFICANT CHANGE UP (ref 0–0)
PH UR: 6 — SIGNIFICANT CHANGE UP (ref 5–8)
PLATELET # BLD AUTO: 308 K/UL — SIGNIFICANT CHANGE UP (ref 150–400)
POTASSIUM SERPL-MCNC: 3.4 MMOL/L — LOW (ref 3.5–5.3)
POTASSIUM SERPL-SCNC: 3.4 MMOL/L — LOW (ref 3.5–5.3)
PROT SERPL-MCNC: 6.9 G/DL — SIGNIFICANT CHANGE UP (ref 6–8.3)
PROT UR-MCNC: NEGATIVE — SIGNIFICANT CHANGE UP
RBC # BLD: 4.8 M/UL — SIGNIFICANT CHANGE UP (ref 3.8–5.2)
RBC # FLD: 12.6 % — SIGNIFICANT CHANGE UP (ref 10.3–14.5)
RBC CASTS # UR COMP ASSIST: 1 /HPF — SIGNIFICANT CHANGE UP (ref 0–4)
SODIUM SERPL-SCNC: 137 MMOL/L — SIGNIFICANT CHANGE UP (ref 135–145)
SP GR SPEC: 1.02 — SIGNIFICANT CHANGE UP (ref 1.01–1.02)
UROBILINOGEN FLD QL: NEGATIVE — SIGNIFICANT CHANGE UP
WBC # BLD: 10.43 K/UL — SIGNIFICANT CHANGE UP (ref 3.8–10.5)
WBC # FLD AUTO: 10.43 K/UL — SIGNIFICANT CHANGE UP (ref 3.8–10.5)
WBC UR QL: 1 /HPF — SIGNIFICANT CHANGE UP (ref 0–5)

## 2022-02-28 PROCEDURE — 76801 OB US < 14 WKS SINGLE FETUS: CPT | Mod: 26

## 2022-02-28 PROCEDURE — 76817 TRANSVAGINAL US OBSTETRIC: CPT

## 2022-02-28 PROCEDURE — 80053 COMPREHEN METABOLIC PANEL: CPT

## 2022-02-28 PROCEDURE — 99284 EMERGENCY DEPT VISIT MOD MDM: CPT | Mod: 25

## 2022-02-28 PROCEDURE — 84702 CHORIONIC GONADOTROPIN TEST: CPT

## 2022-02-28 PROCEDURE — 86850 RBC ANTIBODY SCREEN: CPT

## 2022-02-28 PROCEDURE — 85025 COMPLETE CBC W/AUTO DIFF WBC: CPT

## 2022-02-28 PROCEDURE — 76802 OB US < 14 WKS ADDL FETUS: CPT

## 2022-02-28 PROCEDURE — 86900 BLOOD TYPING SEROLOGIC ABO: CPT

## 2022-02-28 PROCEDURE — 99285 EMERGENCY DEPT VISIT HI MDM: CPT

## 2022-02-28 PROCEDURE — 86901 BLOOD TYPING SEROLOGIC RH(D): CPT

## 2022-02-28 PROCEDURE — 81001 URINALYSIS AUTO W/SCOPE: CPT

## 2022-02-28 PROCEDURE — 76817 TRANSVAGINAL US OBSTETRIC: CPT | Mod: 26

## 2022-02-28 PROCEDURE — 76801 OB US < 14 WKS SINGLE FETUS: CPT

## 2022-02-28 RX ORDER — SODIUM CHLORIDE 9 MG/ML
1000 INJECTION, SOLUTION INTRAVENOUS ONCE
Refills: 0 | Status: DISCONTINUED | OUTPATIENT
Start: 2022-02-28 | End: 2022-02-28

## 2022-02-28 RX ORDER — METOCLOPRAMIDE HCL 10 MG
10 TABLET ORAL ONCE
Refills: 0 | Status: DISCONTINUED | OUTPATIENT
Start: 2022-02-28 | End: 2022-02-28

## 2022-02-28 NOTE — CONSULT NOTE ADULT - ASSESSMENT
35y/o  @12w3d (DALLAS 22) presenting to the ED complaining of vaginal discharge and bleeding. Patient in stable condition, VSS, exam with dark blood in the vault but no active bleeding or leakage from the os.   - No acute OB intervention  - Difficult to categorize   - Bleeding precautions reviewed. Pt advised to return to the ED is she is soaking through 1 large pad/hour for > 2 hours, clot passage larger than fist-sized, experiences lightheadedness, dizziness, vomiting, inability to tolerate PO.   - Return precautions reviewed  - Pt to resume routine prenatal care should OB US be wnl    D/W Dr. Maggie Maher, PGY2

## 2022-02-28 NOTE — ED PROVIDER NOTE - NSICDXPASTMEDICALHX_GEN_ALL_CORE_FT
PAST MEDICAL HISTORY:  COVID-19 tested positive on 3/1/2021, c/o fever, abd cramp, no hospitalization, no oxygen supplement.    Malignant melanoma, unspecified site hx melanoma on left chest    Uterine polyp

## 2022-02-28 NOTE — ED ADULT TRIAGE NOTE - CHIEF COMPLAINT QUOTE
12 weeks pregnant with bloody/watery vag discharge x about 1 hour; sent by ob gyn; mild abd discomfort 12 weeks pregnant with bloody/watery vag discharge x about 1 hour; sent by ob gyn for concern for miscarriage; mild abd discomfort

## 2022-02-28 NOTE — ED PROVIDER NOTE - PATIENT PORTAL LINK FT
You can access the FollowMyHealth Patient Portal offered by [facility] by registering at the following website: [email or web address]. By joining [facility]’s FollowMyHealth portal, you will also be able to view your health information using other applications (apps) compatible with our system.

## 2022-02-28 NOTE — ED PROVIDER NOTE - NSFOLLOWUPINSTRUCTIONS_ED_ALL_ED_FT
We evaluated you for vaginal bleeding today. We checked your hemoglobin and you do not need a blood transfusion at this time. Your situation can change quickly. If you develop bleeding that you are soaking through more than 2 pads per hour for 2 hours, if you develop lightheadedness/dizziness/feeling like you will pass out, chest pain, shortness of breath please return to the emergency room. We recommend that you make an appointment with your obstetrician-gynecologist for repeat evaluation and to ensure the symptoms are improving.

## 2022-02-28 NOTE — ED PROVIDER NOTE - CLINICAL SUMMARY MEDICAL DECISION MAKING FREE TEXT BOX
37 y/o female G2A1 w/ PMH IVF and D&C at 16 weeks gestation for the first pregnancy c/o 1 hour history of vaginal watery and bloody discharge. Concerning for miscarriage. Will screen w/ TVUS and reassess.

## 2022-02-28 NOTE — ED PROVIDER NOTE - ATTENDING CONTRIBUTION TO CARE
Ward White MD:   I personally saw the patient and performed a substantive portion of the visit including all aspects of the medical decision making.    MDM: Work up for first trimester vaginal bleeding with quantitative HCG, H/H, T&S with Rh, U/A and Transvaginal Ultrasound. Will evaluate for threatened or spontaneous , ectopic pregnancy, and subchorionic hematoma. Will evaluate for asymptomatic bacteriuria and Rh status. Stable vitals, benign abdominal examination. Ward White MD:   I personally saw the patient and performed a substantive portion of the visit including all aspects of the medical decision making.    MDM: Work up for first trimester vaginal bleeding with quantitative HCG, H/H, T&S with Rh, U/A and Transvaginal Ultrasound. Will evaluate for threatened or spontaneous , ectopic pregnancy, and subchorionic hematoma. Will evaluate for asymptomatic bacteriuria and Rh status. Stable vitals, benign abdominal examination. Signed out to Dr. Peck at 0700 pending TVUS results and OBGYN consultation.

## 2022-02-28 NOTE — ED ADULT NURSE NOTE - CHIEF COMPLAINT QUOTE
12 weeks pregnant with bloody/watery vag discharge x about 1 hour; sent by ob gyn for concern for miscarriage; mild abd discomfort

## 2022-02-28 NOTE — ED PROVIDER NOTE - OBJECTIVE STATEMENT
37 y/o female G2A1 w/ PMH IVF and D&C at 16 weeks gestation for the first pregnancy c/o 1 hour history of vaginal watery and bloody discharge. Admits to mild, local, constant abdominal discomfort s/p vaginal discharge. Denies fevers, chills, nausea, vomiting, dizziness, chest pain, SOB, dysuria, hematuria.

## 2022-02-28 NOTE — ED ADULT NURSE NOTE - OBJECTIVE STATEMENT
Pt is a 36 yr old female with no pmh coming from home for watery discharge from her vagina. Pt states she is 12 weeks pregnant (2nd pregnancy- 1st was d&c at 16 weeks). Pt states an hour ago she woke up and had "watery like discharge- but bloody" from her vagina. Pt has no associated abd cramping or symptoms with the bleeding. Pt is a/ox 3- vitals stable.

## 2022-02-28 NOTE — ED PROVIDER NOTE - PROGRESS NOTE DETAILS
Patient evaluated by OBGYN, cleared for discharge. US with small subchorionic hemorrhage. Will discharge to follow up with her GYN. Domingo Keith, DO PGY3

## 2022-02-28 NOTE — ED ADULT NURSE REASSESSMENT NOTE - NS ED NURSE REASSESS COMMENT FT1
Report received from MJ Guevara. Pt reports to ED c/o LLQ. Pt is A&Ox3 and VSS. Pt awaiting CT scan and urine. Pt complaining of 7/10 pain, MD Meyer aware, morphine to be given. Pt aware of POC. Report received from MJ Guevara. Pt reports to ED c/o vaginal bleeding. Pt is A&O x3 and VSS. awaiting US results. Pt is complaining on nausea and headache, MD Meyer aware, Reglan to be given. Pt aware of POC. Report received from MJ Colon. Pt reports to ED c/o vaginal bleeding. Pt is A&O x3 and VSS. awaiting US results. Pt is complaining on nausea and headache, MD Meeyr aware, Reglan to be given. Pt aware of POC.

## 2022-02-28 NOTE — ED PROVIDER NOTE - PHYSICAL EXAMINATION
GENERAL: well appearing in no acute distress, non-toxic appearing  HEAD: normocephalic, atraumatic  HEENT: normal conjunctiva, oral mucosa moist, uvula midline, no tonsilar exudates, neck supple, no JVD  CARDIAC: regular rate and rhythm, normal S1S2, no appreciable murmurs  PULM: normal breath sounds, clear to ascultation bilaterally, no rales, rhonchi, wheezing  GI: abdomen nondistended, soft, nontender, no guarding, rebound tenderness  : no CVA tenderness b/l, no suprapubic tenderness  NEURO: no focal motor or sensory deficits, CN2-12 intact, normal speech, PERRLA, EOMI, normal gait, AAOx3  MSK: no peripheral edema, no calf tenderness b/l  SKIN: extremities warm, no visible rashes  PSYCH: appropriate mood and affect   - Chaperone Misa Mccallum RN, No bleeding on pad. External labia wnl. Speculum exam with no vaginal lesions, minimal amount of blood visualized. Bimanual exam with no cervical motion tenderness, uterus wnl, adnexa non palpable b/l. GENERAL: well appearing in no acute distress, non-toxic appearing  HEAD: normocephalic, atraumatic  HEENT: normal conjunctiva, oral mucosa moist, uvula midline, no tonsilar exudates, neck supple, no JVD  CARDIAC: regular rate and rhythm, normal S1S2, no appreciable murmurs  PULM: normal breath sounds, clear to ascultation bilaterally, no rales, rhonchi, wheezing  GI: abdomen nondistended, soft, nontender, no guarding, rebound tenderness  : no CVA tenderness b/l, no suprapubic tenderness  NEURO: no focal motor or sensory deficits, CN2-12 intact, normal speech, PERRLA, EOMI, normal gait, AAOx3  MSK: no peripheral edema, no calf tenderness b/l  SKIN: extremities warm, no visible rashes  PSYCH: appropriate mood and affect   - Chaperone Misa Mccallum RN,  bleeding present on pad. External labia wnl. Speculum exam with no vaginal lesions, moderate amount of blood visualized. Bimanual exam with no cervical motion tenderness, uterus wnl, adnexa non palpable b/l.

## 2022-02-28 NOTE — ED PROVIDER NOTE - NS ED ROS FT
CONSTITUTIONAL: No fever, weight loss, or fatigue  EYES: No eye pain, visual disturbances, or discharge  ENMT:  No difficulty hearing, tinnitus, vertigo; No sinus or throat pain  NECK: No pain or stiffness  RESPIRATORY: No cough, wheezing, chills or hemoptysis; No shortness of breath  CARDIOVASCULAR: No chest pain, palpitations, dizziness, or leg swelling  GASTROINTESTINAL: +Lower abd discomfort. No nausea, vomiting, or hematemesis; No diarrhea or constipation. No melena or hematochezia.  GENITOURINARY: No dysuria, frequency, hematuria, or incontinence  NEUROLOGICAL: No headaches, memory loss, loss of strength, numbness, or tremors  SKIN: No itching, burning, rashes, or lesions   Vagina: +Discharge watery and bloody

## 2022-02-28 NOTE — ED ADULT NURSE NOTE - ED STAT RN HAND OFF
OUTPATIENT INFUSION CENTER    DISCHARGE INSTRUCTIONS FOR:    IRON INFUSIONS - INCLUDING VENOFER, FERRLECIT, AND INFED    You should continue to take your usual home medications unless otherwise instructed by your physician. Drink plenty of fluids and eat your usual diet. All medications have the potential to cause side effects. Your physician can instruct you regarding any necessary treatment for side effects. Some possible side effects of iron infusions may include the following:     - Urinary changes;   - Mild muscle cramping;   - Mild nausea, stomach pain, diarrhea or constipation;   - Mild skin itching, mild pain at IV site. Signs/Symptoms of an allergic reaction may require immediate medical attention. These may include one or more of the following:       Skin redness, itching, swelling, blistering, weeping, crusting, rash or hives. Wheezing, chest tightness, cough, or shortness of breath;   Swelling of the face, eyelids, lips, tongue, or throat;  Severe headache, seizures or tremor;  Stuffy nose, runny nose, sneezing;   Red (bloodshot), itchy, swollen, or watery eyes;  Stomach  pain, nausea, vomiting, diarrhea or bloody diarrhea; Chest pain or tightness, increased heart rate, palpitations, changes in blood pressure which can cause dizziness, unusual feelings of weakness or fatigue;  Back ache or pain around waist;  Painful urination, increase or decrease in amount of urine, blood in urine. Contact your physicians office with any questions or concerns regarding your treatment.     Manuela Young, Signature: _____________________________________ 2/1/2018  Thiago Hilton RN Handoff

## 2022-02-28 NOTE — CONSULT NOTE ADULT - SUBJECTIVE AND OBJECTIVE BOX
Gyn Consult Note  MAYANK BARROSO  36y  Female [id number]    HPI: 37y/o  @12w3d ([place] 22) presenting to the ED complaining of vaginal discharge and bleeding. GYN consulted for vaginal bleeding and discharge.  Patient reports bleeding that started at 230 this morning. She denies any prior bleeding episodes this pregnancy no placental or cervical issues. She denies lightheadedness, dizziness, HA, CP, palpitations, N/V, urinary symptoms, and changes in bowel habits.    Name of GYN Physician: Dr. Sharon Mustafa    OBHx:    - TOP @16wks s/p D&E  GYNHx: Denies fibroids, cysts, endometriosis, STI's  - LSIL (prior to rpegnancy)  PMHx: Denies  PSHx: MOHs (chest wall), D&E  Meds: PNVs  Allergies: NKDA      Vital Signs Last 24 Hrs  T(C): 37.1 (2022 05:00), Max: 37.1 (2022 05:00)  T(F): 98.8 (2022 05:00), Max: 98.8 (2022 05:00)  HR: 86 (2022 05:00) (86 - 91)  BP: 107/74 (2022 05:00) (107/74 - 162/81)  BP(mean): 83 (2022 05:00) (83 - 83)  RR: 17 (2022 05:00) (17 - 18)  SpO2: 100% (2022 05:00) (100% - 100%)    Physical Exam:   General: sitting comfortably in bed, NAD   CV: RRR  Lungs: CTAB  Abd: Soft, non-tender, non-distended.  Bowel sounds present.    : Minimal bleeding on pad. External labia wnl. Uterus wnl, nontender. Adnexa non palpable b/l. No CMT. Cervix closed.   Speculum Exam: No active bleeding from os. Dark red blood in vault.     Ext: non-tender b/l, no edema     LABS:             14.1   10.43 )-----------( 308      ( 2022 04:46 )             41.2     02-  137  |  102  |  12  ----------------------------<  95  3.4<L>   |  22  |  0.60    Ca    9.5      2022 04:46    TPro  6.9  /  Alb  4.5  /  TBili  0.2  /  DBili  x   /  AST  11  /  ALT  9<L>  /  AlkPhos  39<L>        RADIOLOGY & ADDITIONAL STUDIES:  *TVUS pending*

## 2022-04-25 ENCOUNTER — ASOB RESULT (OUTPATIENT)
Age: 37
End: 2022-04-25

## 2022-04-25 ENCOUNTER — APPOINTMENT (OUTPATIENT)
Dept: ANTEPARTUM | Facility: CLINIC | Age: 37
End: 2022-04-25
Payer: COMMERCIAL

## 2022-04-25 PROCEDURE — 76811 OB US DETAILED SNGL FETUS: CPT

## 2022-09-04 ENCOUNTER — INPATIENT (INPATIENT)
Facility: HOSPITAL | Age: 37
LOS: 1 days | Discharge: HOME | End: 2022-09-06
Attending: OBSTETRICS & GYNECOLOGY | Admitting: OBSTETRICS & GYNECOLOGY
Payer: COMMERCIAL

## 2022-09-04 VITALS
DIASTOLIC BLOOD PRESSURE: 66 MMHG | OXYGEN SATURATION: 95 % | HEART RATE: 76 BPM | SYSTOLIC BLOOD PRESSURE: 116 MMHG | RESPIRATION RATE: 18 BRPM | TEMPERATURE: 98 F

## 2022-09-04 DIAGNOSIS — Z90.89 ACQUIRED ABSENCE OF OTHER ORGANS: Chronic | ICD-10-CM

## 2022-09-04 DIAGNOSIS — Z3A.00 WEEKS OF GESTATION OF PREGNANCY NOT SPECIFIED: ICD-10-CM

## 2022-09-04 DIAGNOSIS — Z98.890 OTHER SPECIFIED POSTPROCEDURAL STATES: Chronic | ICD-10-CM

## 2022-09-04 DIAGNOSIS — O26.899 OTHER SPECIFIED PREGNANCY RELATED CONDITIONS, UNSPECIFIED TRIMESTER: ICD-10-CM

## 2022-09-04 DIAGNOSIS — C43.9 MALIGNANT MELANOMA OF SKIN, UNSPECIFIED: Chronic | ICD-10-CM

## 2022-09-04 DIAGNOSIS — Z34.80 ENCOUNTER FOR SUPERVISION OF OTHER NORMAL PREGNANCY, UNSPECIFIED TRIMESTER: ICD-10-CM

## 2022-09-04 LAB
BASOPHILS # BLD AUTO: 0.06 K/UL — SIGNIFICANT CHANGE UP (ref 0–0.2)
BASOPHILS NFR BLD AUTO: 0.4 % — SIGNIFICANT CHANGE UP (ref 0–2)
COVID-19 SPIKE DOMAIN AB INTERP: POSITIVE
COVID-19 SPIKE DOMAIN ANTIBODY RESULT: >250 U/ML — HIGH
EOSINOPHIL # BLD AUTO: 0.09 K/UL — SIGNIFICANT CHANGE UP (ref 0–0.5)
EOSINOPHIL NFR BLD AUTO: 0.6 % — SIGNIFICANT CHANGE UP (ref 0–6)
HCT VFR BLD CALC: 37.1 % — SIGNIFICANT CHANGE UP (ref 34.5–45)
HGB BLD-MCNC: 12.7 G/DL — SIGNIFICANT CHANGE UP (ref 11.5–15.5)
IMM GRANULOCYTES NFR BLD AUTO: 1.5 % — SIGNIFICANT CHANGE UP (ref 0–1.5)
LYMPHOCYTES # BLD AUTO: 19.1 % — SIGNIFICANT CHANGE UP (ref 13–44)
LYMPHOCYTES # BLD AUTO: 3.05 K/UL — SIGNIFICANT CHANGE UP (ref 1–3.3)
MCHC RBC-ENTMCNC: 30.1 PG — SIGNIFICANT CHANGE UP (ref 27–34)
MCHC RBC-ENTMCNC: 34.2 GM/DL — SIGNIFICANT CHANGE UP (ref 32–36)
MCV RBC AUTO: 87.9 FL — SIGNIFICANT CHANGE UP (ref 80–100)
MONOCYTES # BLD AUTO: 1.4 K/UL — HIGH (ref 0–0.9)
MONOCYTES NFR BLD AUTO: 8.8 % — SIGNIFICANT CHANGE UP (ref 2–14)
NEUTROPHILS # BLD AUTO: 11.14 K/UL — HIGH (ref 1.8–7.4)
NEUTROPHILS NFR BLD AUTO: 69.6 % — SIGNIFICANT CHANGE UP (ref 43–77)
NRBC # BLD: 0 /100 WBCS — SIGNIFICANT CHANGE UP (ref 0–0)
PLATELET # BLD AUTO: 218 K/UL — SIGNIFICANT CHANGE UP (ref 150–400)
RBC # BLD: 4.22 M/UL — SIGNIFICANT CHANGE UP (ref 3.8–5.2)
RBC # FLD: 13.7 % — SIGNIFICANT CHANGE UP (ref 10.3–14.5)
SARS-COV-2 IGG+IGM SERPL QL IA: >250 U/ML — HIGH
SARS-COV-2 IGG+IGM SERPL QL IA: POSITIVE
SARS-COV-2 RNA SPEC QL NAA+PROBE: SIGNIFICANT CHANGE UP
T PALLIDUM AB TITR SER: NEGATIVE — SIGNIFICANT CHANGE UP
WBC # BLD: 15.98 K/UL — HIGH (ref 3.8–10.5)
WBC # FLD AUTO: 15.98 K/UL — HIGH (ref 3.8–10.5)

## 2022-09-04 RX ORDER — SODIUM CHLORIDE 9 MG/ML
500 INJECTION, SOLUTION INTRAVENOUS ONCE
Refills: 0 | Status: DISCONTINUED | OUTPATIENT
Start: 2022-09-04 | End: 2022-09-04

## 2022-09-04 RX ORDER — DIPHENHYDRAMINE HCL 50 MG
25 CAPSULE ORAL EVERY 6 HOURS
Refills: 0 | Status: DISCONTINUED | OUTPATIENT
Start: 2022-09-04 | End: 2022-09-06

## 2022-09-04 RX ORDER — MAGNESIUM HYDROXIDE 400 MG/1
30 TABLET, CHEWABLE ORAL
Refills: 0 | Status: DISCONTINUED | OUTPATIENT
Start: 2022-09-04 | End: 2022-09-06

## 2022-09-04 RX ORDER — PRAMOXINE HYDROCHLORIDE 150 MG/15G
1 AEROSOL, FOAM RECTAL EVERY 4 HOURS
Refills: 0 | Status: DISCONTINUED | OUTPATIENT
Start: 2022-09-04 | End: 2022-09-06

## 2022-09-04 RX ORDER — SODIUM CHLORIDE 9 MG/ML
3 INJECTION INTRAMUSCULAR; INTRAVENOUS; SUBCUTANEOUS EVERY 8 HOURS
Refills: 0 | Status: DISCONTINUED | OUTPATIENT
Start: 2022-09-04 | End: 2022-09-06

## 2022-09-04 RX ORDER — INFLUENZA VIRUS VACCINE 15; 15; 15; 15 UG/.5ML; UG/.5ML; UG/.5ML; UG/.5ML
0.5 SUSPENSION INTRAMUSCULAR ONCE
Refills: 0 | Status: COMPLETED | OUTPATIENT
Start: 2022-09-04 | End: 2022-09-04

## 2022-09-04 RX ORDER — SODIUM CHLORIDE 9 MG/ML
1000 INJECTION, SOLUTION INTRAVENOUS
Refills: 0 | Status: DISCONTINUED | OUTPATIENT
Start: 2022-09-04 | End: 2022-09-04

## 2022-09-04 RX ORDER — TETANUS TOXOID, REDUCED DIPHTHERIA TOXOID AND ACELLULAR PERTUSSIS VACCINE, ADSORBED 5; 2.5; 8; 8; 2.5 [IU]/.5ML; [IU]/.5ML; UG/.5ML; UG/.5ML; UG/.5ML
0.5 SUSPENSION INTRAMUSCULAR ONCE
Refills: 0 | Status: DISCONTINUED | OUTPATIENT
Start: 2022-09-04 | End: 2022-09-06

## 2022-09-04 RX ORDER — HYDROCORTISONE 1 %
1 OINTMENT (GRAM) TOPICAL EVERY 6 HOURS
Refills: 0 | Status: DISCONTINUED | OUTPATIENT
Start: 2022-09-04 | End: 2022-09-06

## 2022-09-04 RX ORDER — KETOROLAC TROMETHAMINE 30 MG/ML
30 SYRINGE (ML) INJECTION ONCE
Refills: 0 | Status: DISCONTINUED | OUTPATIENT
Start: 2022-09-04 | End: 2022-09-04

## 2022-09-04 RX ORDER — SODIUM CHLORIDE 9 MG/ML
1000 INJECTION, SOLUTION INTRAVENOUS ONCE
Refills: 0 | Status: DISCONTINUED | OUTPATIENT
Start: 2022-09-04 | End: 2022-09-04

## 2022-09-04 RX ORDER — LANOLIN
1 OINTMENT (GRAM) TOPICAL EVERY 6 HOURS
Refills: 0 | Status: DISCONTINUED | OUTPATIENT
Start: 2022-09-04 | End: 2022-09-06

## 2022-09-04 RX ORDER — IBUPROFEN 200 MG
600 TABLET ORAL EVERY 6 HOURS
Refills: 0 | Status: DISCONTINUED | OUTPATIENT
Start: 2022-09-04 | End: 2022-09-06

## 2022-09-04 RX ORDER — OXYTOCIN 10 UNIT/ML
333.33 VIAL (ML) INJECTION
Qty: 20 | Refills: 0 | Status: COMPLETED | OUTPATIENT
Start: 2022-09-04 | End: 2022-09-04

## 2022-09-04 RX ORDER — IBUPROFEN 200 MG
600 TABLET ORAL EVERY 6 HOURS
Refills: 0 | Status: COMPLETED | OUTPATIENT
Start: 2022-09-04 | End: 2023-08-03

## 2022-09-04 RX ORDER — CITRIC ACID/SODIUM CITRATE 300-500 MG
15 SOLUTION, ORAL ORAL EVERY 6 HOURS
Refills: 0 | Status: DISCONTINUED | OUTPATIENT
Start: 2022-09-04 | End: 2022-09-04

## 2022-09-04 RX ORDER — CHLORHEXIDINE GLUCONATE 213 G/1000ML
1 SOLUTION TOPICAL ONCE
Refills: 0 | Status: DISCONTINUED | OUTPATIENT
Start: 2022-09-04 | End: 2022-09-04

## 2022-09-04 RX ORDER — BENZOCAINE 10 %
1 GEL (GRAM) MUCOUS MEMBRANE EVERY 6 HOURS
Refills: 0 | Status: DISCONTINUED | OUTPATIENT
Start: 2022-09-04 | End: 2022-09-06

## 2022-09-04 RX ORDER — OXYTOCIN 10 UNIT/ML
333.33 VIAL (ML) INJECTION
Qty: 20 | Refills: 0 | Status: DISCONTINUED | OUTPATIENT
Start: 2022-09-04 | End: 2022-09-06

## 2022-09-04 RX ORDER — SIMETHICONE 80 MG/1
80 TABLET, CHEWABLE ORAL EVERY 4 HOURS
Refills: 0 | Status: DISCONTINUED | OUTPATIENT
Start: 2022-09-04 | End: 2022-09-06

## 2022-09-04 RX ORDER — ACETAMINOPHEN 500 MG
975 TABLET ORAL
Refills: 0 | Status: DISCONTINUED | OUTPATIENT
Start: 2022-09-04 | End: 2022-09-06

## 2022-09-04 RX ORDER — DIBUCAINE 1 %
1 OINTMENT (GRAM) RECTAL EVERY 6 HOURS
Refills: 0 | Status: DISCONTINUED | OUTPATIENT
Start: 2022-09-04 | End: 2022-09-06

## 2022-09-04 RX ORDER — AER TRAVELER 0.5 G/1
1 SOLUTION RECTAL; TOPICAL EVERY 4 HOURS
Refills: 0 | Status: DISCONTINUED | OUTPATIENT
Start: 2022-09-04 | End: 2022-09-06

## 2022-09-04 RX ORDER — OXYTOCIN 10 UNIT/ML
4 VIAL (ML) INJECTION
Qty: 30 | Refills: 0 | Status: DISCONTINUED | OUTPATIENT
Start: 2022-09-04 | End: 2022-09-04

## 2022-09-04 RX ADMIN — SODIUM CHLORIDE 125 MILLILITER(S): 9 INJECTION, SOLUTION INTRAVENOUS at 02:10

## 2022-09-04 RX ADMIN — Medication 975 MILLIGRAM(S): at 14:21

## 2022-09-04 RX ADMIN — Medication 600 MILLIGRAM(S): at 18:40

## 2022-09-04 RX ADMIN — Medication 1000 MILLIUNIT(S)/MIN: at 10:00

## 2022-09-04 RX ADMIN — Medication 600 MILLIGRAM(S): at 23:31

## 2022-09-04 RX ADMIN — Medication 975 MILLIGRAM(S): at 15:00

## 2022-09-04 RX ADMIN — Medication 975 MILLIGRAM(S): at 20:59

## 2022-09-04 RX ADMIN — Medication 30 MILLIGRAM(S): at 10:15

## 2022-09-04 RX ADMIN — SODIUM CHLORIDE 125 MILLILITER(S): 9 INJECTION, SOLUTION INTRAVENOUS at 03:16

## 2022-09-04 RX ADMIN — Medication 975 MILLIGRAM(S): at 21:29

## 2022-09-04 RX ADMIN — Medication 4 MILLIUNIT(S)/MIN: at 06:03

## 2022-09-04 RX ADMIN — Medication 600 MILLIGRAM(S): at 18:09

## 2022-09-04 NOTE — OB RN DELIVERY SUMMARY - NSSELHIDDEN_OBGYN_ALL_OB_FT
[NS_DeliveryAttending1_OBGYN_ALL_OB_FT:[id number]],[NS_DeliveryAssist1_OBGYN_ALL_OB_FT:[id number]],[NS_DeliveryRN_OBGYN_ALL_OB_FT:[id number]=]

## 2022-09-04 NOTE — OB PROVIDER DELIVERY SUMMARY - NSSELHIDDEN_OBGYN_ALL_OB_FT
[NS_DeliveryAttending1_OBGYN_ALL_OB_FT:[id number]],[NS_DeliveryAssist1_OBGYN_ALL_OB_FT:[id number]=]

## 2022-09-04 NOTE — OB PROVIDER H&P - HISTORY OF PRESENT ILLNESS
36yo  @ 39w2d ([place] 22) presenting w/LOF @ 1040pm. Pt reports "feeling a pop" followed by 3-4 gushes of clear fluid after completing voiding. She reports continued leakage and soaking through pants and vaginal pressure. +FM. Denies feeling CTX. Was checked in office 4 days ago and reportedly .    PNC: Maggie. Subchorionic hematoma, resolved. No reported genetic/sono abnl. GBS neg. EFW 3400.    OBHx:  G1 2020 D+C  T18  G2 current    GYNHx: abnl Pap in pregnancy, plan to repeat postpartum  - denies fibroids, ov cysts, STIs    PMH: denies  PSH: D+C x2 ( T1,  adhesions); breast augmentation and removal (); wide skin excision and SLNB for atypical dysplastic melanocytic lesion on chest well (); tonsillectomy    Meds: PNV  All: NKDA    Soc: denies T/E/D  Psych: denies    Will accept blood products.

## 2022-09-04 NOTE — OB PROVIDER H&P - NSICDXPASTSURGICALHX_GEN_ALL_CORE_FT
PAST SURGICAL HISTORY:  History of augmentation of both breasts implants placed 11 years ago----removed 6/2019    History of tonsillectomy     Melanoma of skin 13 years ago    S/P dilatation and curettage

## 2022-09-04 NOTE — OB PROVIDER H&P - NSHPPHYSICALEXAM_GEN_ALL_CORE
Vital Signs Last 24 Hrs  T(C): 36.7 (04 Sep 2022 00:51), Max: 36.7 (04 Sep 2022 00:18)  T(F): 98.1 (04 Sep 2022 00:51), Max: 98.1 (04 Sep 2022 00:18)  HR: 81 (04 Sep 2022 01:25) (70 - 108)  BP: 116/66 (04 Sep 2022 00:51) (116/66 - 116/66)  BP(mean): --  RR: 17 (04 Sep 2022 00:51) (17 - 18)  SpO2: 96% (04 Sep 2022 01:25) (92% - 98%)    Parameters below as of 04 Sep 2022 00:18  Patient On (Oxygen Delivery Method): room air    /mod/+accel/-decel  [place] q10min  SSE +pooling/nitrazine/ferning  SVE 2/60/-3  Sono VTX    Gen: awake, alert, NAD  Chest: nonlabored breathing  Abd: soft, gravid, nontender  : NEFG  LE: nontender

## 2022-09-04 NOTE — OB RN DELIVERY SUMMARY - NS_SEPSISRSKCALC_OBGYN_ALL_OB_FT
EOS calculated successfully. EOS Risk Factor: 0.5/1000 live births (Formerly named [facility] national incidence); GA=39w2d; Temp=98.1; ROM=1.333; GBS='Negative'; Antibiotics='No antibiotics or any antibiotics < 2 hrs prior to birth'

## 2022-09-04 NOTE — OB PROVIDER H&P - ASSESSMENT
36yo  @ 39w2d ([place] 22) admitted with PROM.     - Admit to L+D. Routine labs. IV fluids.  - IOL w/PO cytotec and cervical balloon  - Fetus cat 1.  - GBS neg. HIV NR.  - epi PRN    d/w Dr. Maggie Dixon R4

## 2022-09-04 NOTE — OB PROVIDER LABOR PROGRESS NOTE - NS_SUBJECTIVE/OBJECTIVE_OBGYN_ALL_OB_FT
Pt seen and examined for late decelerations
Pt evaluated to place cervical balloon. Pt has been will too quickly to get her cytotec.

## 2022-09-04 NOTE — OB RN PATIENT PROFILE - NS_PARA_OBGYN_ALL_OB_NU
Department of Anesthesiology  Postprocedure Note    Patient: Vera Ramos  MRN: [id number]  [place]: 10/12/1926  Date of evaluation: 12/20/2017  Time:  5:02 PM     Procedure Summary     Date:  12/20/17 Room / Location:  [facility] OR  / [facility] OR    Anesthesia Start:  1233 Anesthesia Stop:  5506    Procedures:       ENDOSCOPIC ULTRASOUND EGD - GI UNIT (N/A )      ERCP ENDOSCOPIC RETROGRADE CHOLANGIOPANCREATOGRAPHY   C-ARM, GI UNIT (N/A )      EGD BIOPSY      ERCP SPINCTER/PAPILLOTOMY Diagnosis:  (ABDOMINAL PAIN )    Surgeon:  Phillips Curling, MD Responsible Provider:  Refugio Damon MD    Anesthesia Type:  general ASA Status:  3          Anesthesia Type: general    Lula Phase I: Lula Score: 10    Lula Phase II: Lula Score: 10    Last vitals: Reviewed and per EMR flowsheets. Anesthesia Post Evaluation    Patient location during evaluation: PACU  Patient participation: complete - patient participated  Level of consciousness: awake and alert  Pain score: 0  Airway patency: patent  Nausea & Vomiting: no nausea and no vomiting  Complications: no  Cardiovascular status: blood pressure returned to baseline and hemodynamically stable  Respiratory status: acceptable and nonlabored ventilation  Hydration status: euvolemic    Patient alternates between tachycardic (115+/-) and 50-60's. He feels that he is at his baseline and is comfortable going home.
0

## 2022-09-04 NOTE — OB PROVIDER DELIVERY SUMMARY - NSPROVIDERDELIVERYNOTE_OBGYN_ALL_OB_FT
Spontaneous vaginal delivery of liveborn infant from FILIPPO position. MLE cut and head, shoulders, and body delivered easily. Infant was suctioned. No mec. Delayed cord clamping, cord was cut and infant was passed to mother. Placenta delivered intact with a 3 vessel cord. Fundal massage was given and uterine fundus was found to be firm. Vaginal exam revealed an intact cervix, vaginal walls and sulci. MLE repaired with 2.0 vicryl rapide suture with reinforcing stich of external anal sphincter and subcuticular closure of skin. Excellent hemostasis was noted. Patient was stable. Count was correct x 2. Delivery with Dr. Serrano.    Alex Garcia, PGY-1

## 2022-09-04 NOTE — OB PROVIDER LABOR PROGRESS NOTE - ASSESSMENT
Cervical balloon placed successfully. Continue current management.  - PO cytotec when able    D/w Dr. Maggie Galvan PGY1
38 yo  at 39+2 from PROM   - CB out  - c/w pitocin     D/w Dr. Maggie Palmer, PGY2

## 2022-09-04 NOTE — OB RN PATIENT PROFILE - STEPS TO INITIATE SKIN TO SKIN CONTACT DISCUSSED, INCLUDING INITIATING FATHER SKIN TO SKIN IF POSSIBLE.
Subjective   Patient ID: Kiet is a 76 year old male.    Chief Complaint   Patient presents with   • Office Visit     cardiology   • Follow-up     last visit 01/11/22         HPI:   ====    Patient is not very happy with his rheumatologist so far  Is still having some joint pain but no synovitis  Cardiac wise doing well  Exertional dyspnea: Mild, chronic, unchanged in severity and frequency as well as duration  PND and orthopnea: None  Angina and chest pain: None   Dizziness, palpitation and syncope: None  Side effect: None      Past Medical History:   Diagnosis Date   • BPH (benign prostatic hyperplasia)    • CAD (coronary artery disease)    • CHF (congestive heart failure) (CMS/HCC)    • Dyslipidemia    • Essential (primary) hypertension    • Gout    • Malignant neoplasm of bladder (CMS/HCC)    • Rheumatoid aortitis    • Type 2 diabetes mellitus (CMS/[facility])        ALLERGIES:  No Known Allergies     Past Surgical History:   Procedure Laterality Date   • Bladder surgery  08/2013   • Cholecystectomy  2009   • Colonoscopy  2014    at [facility]   • Coronary angioplasty with stent placement  03/2013    PCI   • Coronary artery bypass graft  1993   • Cystoscopy  01/18/2019; 05/12/2017       Family History   Problem Relation Age of Onset   • Cancer Mother    • Heart disease Father        Social History     Tobacco Use   • Smoking status: Former Smoker   • Smokeless tobacco: Never Used   Vaping Use   • Vaping Use: never used   Substance Use Topics   • Alcohol use: Not Currently   • Drug use: Never        Recent hospitalization:  ====================   None     Review of Systems:   ================    Constitutional:  No chills, and no malaise  Eyes:  No change in eyesight, no pain  Ears, nose, mouth, throat, and face:  No pain, no swelling   Respiratory:  No hemoptysis  Cardiovascular:  No palpitation  Gastrointestinal:  No melena  Genitourinary:  No hematuria  Musculoskeletal:  No muscle pain  Neurological:  No change in  speech  Behavioral/Psych:  No change in mood  Skin : no itching   =============================================================================    Objective  =========  Vitals:    03/08/22 1006 03/08/22 1010   BP: (!) 141/77 (!) 148/64   BP Location: LUE - Left upper extremity RUE - Right upper extremity   Patient Position: Sitting Sitting   Cuff Size: Regular Regular   Pulse: 62    Temp: 98 °F (36.7 °C)    TempSrc: Temporal    SpO2: 93%    Weight: 95.1 kg (209 lb 10.5 oz)    Height: 5' 3\" (1.6 m)    PainSc:  0         Physical Exam:  =============  General: Alert, cooperative, no distress.  Obese  Head: No obvious abnormality  Eyes: Normal, no jaundice.  Throat: Lips, mucosa, moist and normal.  Neck: Supple.  Back: Symmetric.  Lungs: Clear, no wheezing, no rhonchi and no rales.  Heart: Regular rate and rhythm, S1, S2 normal.  Systolic murmur  Abdomen: Soft, non-tender.  Extremities: No edema.  Skin: Normal texture  Neurologic: No clear deficit.    =============================================================================    Data  =====  Recent Labs   Lab 09/08/21  0928   CHOLESTEROL 92   HDL 39*   TRIGLYCERIDE 112   CALCULATED LDL 31       Recent Labs   Lab 12/09/21  0915 09/08/21 0928   Sodium  --  141   Chloride  --  111*   BUN 19 67*   GFR Estimate, African American  --  37*   BUN/Creatinine Ratio  --  32*   BUN/ Creatinine Ratio 18  --    Potassium  --  4.6   Glucose  --  93   Creatinine 1.04 2.12*   GFR Estimate, Non   --  31*   CALCIUM  --  8.9          Hemoglobin A1C (%)   Date Value   09/08/2021 6.6 (H)   .    Recent Labs   Lab 12/09/21  0915 09/08/21 0928   WBC 10.6 7.6   RBC 3.89* 3.7*   HGB 11.9* 11.7*   HCT 38.7* 35.2*   MCV 99.5 96   MCHC 30.7* 33.2   RDW-CV 14.2 18.8*    314   LYMPH  --  19.8*   Lymphocytes, Percent 12  --          ECHO 06/2021  STUDY CONCLUSIONS  SUMMARY:   Left ventricle: The cavity size is normal. Wall thickness is  normal. There is is a mild hypokinesis  of the mid and distal anteroseptal  segments The ejection fraction was measured by biplane method of disks.  Doppler parameters are consistent with abnormal left ventricular  relaxation (grade 1 diastolic dysfunction). The ejection fraction is  55-60%.        ==============================================================================     Assessment :  ============    Coronary artery disease involving native coronary artery of native heart without angina pectoris  (primary encounter diagnosis)  Type 2 diabetes mellitus without complication, without long-term current use of insulin (CMS/HCC)  Essential (primary) hypertension  Rheumatoid aortitis  Gout, unspecified cause, unspecified chronicity, unspecified site  Congestive heart failure, unspecified HF chronicity, unspecified heart failure type (CMS/[facility])  Malignant neoplasm of urinary bladder, unspecified site (CMS/[facility])  Mixed hyperlipidemia  Rheumatoid arthritis, involving unspecified site, unspecified whether rheumatoid factor present (CMS/[facility])    Plan:  =====    Coronary artery disease  --------------------------  No chest pain no angina  Continue Statin  Keep Aspirin      Old CVA  -------  No significant residual continue on aspirin and statin        Hypertension  ---------------   not well controlled  Lower salt diet  Patient was encouraged to be compliant with his medication at all time   lisinopril  Amlodipine  Lasix      Hyperlipidemia  -------------------  Low-cholesterol diet  Continue with Statin/Zocor and Zetia.      Last LDL was 41 triglyceride is 111    check lipid profile      Diabetes  ------------  Low-calorie diet  Low carbohydrate diet  Glipizide  Prior A1c was 7.9  Check A1c next time     BPH  -------  Symptoms is fairly well controlled  On finasteride and tamsulosin    Gout  ------  No attack  On allopurinol    Rheumatoid arthritis  -------------------------  Following a rheumatologist        Duration : 30 minutes  Return in 2 months    Current  Outpatient Medications   Medication Sig Dispense Refill   • tamsulosin (FLOMAX) 0.4 MG Cap TAKE 1 CAPSULE BY MOUTH DAILY AFTER A MEAL 90 capsule 3   • allopurinol (ZYLOPRIM) 100 MG tablet TAKE 2 TABLETS BY MOUTH EVERY  tablet 3   • methotrexate 2.5 MG Tab Take 6 tablets by mouth 1 day a week. 24 tablet 0   • folic acid (FOLATE) 1 MG tablet Take 1 tablet by mouth daily. 90 tablet 3   • finasteride (PROSCAR) 5 MG tablet Take 1 tablet by mouth daily. 90 tablet 3   • amLODIPine (NORVASC) 2.5 MG tablet Take 1 tablet by mouth daily. 30 tablet 3   • simvastatin (ZOCOR) 40 MG tablet Take 1 tablet by mouth every evening. 90 tablet 3   • metoPROLOL succinate (TOPROL-XL) 100 MG 24 hr tablet TAKE 1 TABLET BY MOUTH EVERY DAY 90 tablet 3   • ezetimibe (ZETIA) 10 MG tablet Take 1 tablet by mouth daily. 90 tablet 3   • furosemide (LASIX) 40 MG tablet Take 1 tablet by mouth daily. 90 tablet 3   • glipiZIDE (GLUCOTROL) 5 MG tablet Take 1 tablet by mouth 2 times daily (before meals). 180 tablet 3   • lisinopril (ZESTRIL) 40 MG tablet Take 1 tablet by mouth daily. 90 tablet 3   • aspirin (ECOTRIN) 81 MG EC tablet Take 81 mg by mouth daily.     • hydroxychloroquine (PLAQUENIL) 200 MG tablet Take 200 mg by mouth daily.       No current facility-administered medications for this visit.          Electronically signed by:  Grayson Soriano MD, 3/8/2022    Statement Selected

## 2022-09-04 NOTE — OB PROVIDER H&P - NS_OBGYNHISTORY_OBGYN_ALL_OB_FT
PNC: Maggie. Subchorionic hematoma, resolved. No reported genetic/sono abnl. GBS neg. EFW 3400.    OBHx:  G1 9/2020 D+C 2/2 T18  G2 current    GYNHx: abnl Pap in pregnancy, plan to repeat postpartum  - denies fibroids, ov cysts, STIs

## 2022-09-04 NOTE — OB RN PATIENT PROFILE - FALL HARM RISK - UNIVERSAL INTERVENTIONS
Bed in lowest position, wheels locked, appropriate side rails in place/Call bell, personal items and telephone in reach/Instruct patient to call for assistance before getting out of bed or chair/Non-slip footwear when patient is out of bed/[place] to call system/Physically safe environment - no spills, clutter or unnecessary equipment/Purposeful Proactive Rounding/Room/bathroom lighting operational, light cord in reach

## 2022-09-04 NOTE — OB PROVIDER H&P - NS_FINALEDD_OBGYN_ALL_OB_DT
Patient Seen in: [facility] AND Tyler CPT=71045) TIME: 1500 hours  COMPARISON: [facility], CT CHEST PAIN/PE (IV ONLY) EM, 1/01/2018, 2:01 PM.  [facility], XR CHEST AP PORTABLE (CPT=71045), 09-Sep-2022

## 2022-09-04 NOTE — PRE-ANESTHESIA EVALUATION ADULT - NSANTHPMHFT_GEN_ALL_CORE
39.2 weeks gestation; D+C x2 (2020 2/2 T18, 2021 2/2 adhesions); breast augmentation and removal (2019); wide skin excision and SLNB for atypical dysplastic melanocytic lesion on chest well (2009); tonsillectomy

## 2022-09-04 NOTE — OB PROVIDER H&P - NSTRANFUSIONOBJECTION_GEN_ALL_CORE_SIUH
Patient has no objection to blood transfusions.
spoke with pts sister (Nathalia [phone]) pt has been experiencing left leg heaviness and weakness x 2 weeks as well as not eating x 2-3 days. pt noted to be febrile and tachy in triage.

## 2022-09-05 RX ADMIN — Medication 600 MILLIGRAM(S): at 17:37

## 2022-09-05 RX ADMIN — Medication 975 MILLIGRAM(S): at 09:03

## 2022-09-05 RX ADMIN — Medication 975 MILLIGRAM(S): at 20:53

## 2022-09-05 RX ADMIN — Medication 975 MILLIGRAM(S): at 03:11

## 2022-09-05 RX ADMIN — Medication 975 MILLIGRAM(S): at 15:20

## 2022-09-05 RX ADMIN — Medication 975 MILLIGRAM(S): at 21:30

## 2022-09-05 RX ADMIN — Medication 1 TABLET(S): at 11:18

## 2022-09-05 RX ADMIN — Medication 600 MILLIGRAM(S): at 06:12

## 2022-09-05 RX ADMIN — Medication 600 MILLIGRAM(S): at 11:18

## 2022-09-05 RX ADMIN — Medication 600 MILLIGRAM(S): at 12:00

## 2022-09-05 RX ADMIN — Medication 600 MILLIGRAM(S): at 23:25

## 2022-09-05 RX ADMIN — Medication 975 MILLIGRAM(S): at 03:41

## 2022-09-05 RX ADMIN — Medication 600 MILLIGRAM(S): at 00:01

## 2022-09-05 RX ADMIN — Medication 600 MILLIGRAM(S): at 18:26

## 2022-09-05 RX ADMIN — Medication 975 MILLIGRAM(S): at 14:25

## 2022-09-05 RX ADMIN — Medication 975 MILLIGRAM(S): at 10:00

## 2022-09-05 NOTE — PROGRESS NOTE ADULT - ASSESSMENT
A/P: 38yo PPD#1 s/p .  Patient is stable and doing well post-partum.   - Pain well controlled, continue current pain regimen  - Increase ambulation  - Continue regular diet    Tyrell Gomez, PGY-1  Ob/Gyn

## 2022-09-05 NOTE — PROGRESS NOTE ADULT - ATTENDING COMMENTS
Patient seen and examined.  Agree with above.    Regular diet  ambulate  po pain meds    Michelle Ogden MD  OB attg

## 2022-09-06 ENCOUNTER — TRANSCRIPTION ENCOUNTER (OUTPATIENT)
Age: 37
End: 2022-09-06

## 2022-09-06 VITALS
OXYGEN SATURATION: 96 % | RESPIRATION RATE: 18 BRPM | DIASTOLIC BLOOD PRESSURE: 59 MMHG | TEMPERATURE: 98 F | HEART RATE: 72 BPM | SYSTOLIC BLOOD PRESSURE: 97 MMHG

## 2022-09-06 PROCEDURE — 86901 BLOOD TYPING SEROLOGIC RH(D): CPT

## 2022-09-06 PROCEDURE — 87635 SARS-COV-2 COVID-19 AMP PRB: CPT

## 2022-09-06 PROCEDURE — 86900 BLOOD TYPING SEROLOGIC ABO: CPT

## 2022-09-06 PROCEDURE — 59025 FETAL NON-STRESS TEST: CPT

## 2022-09-06 PROCEDURE — 59050 FETAL MONITOR W/REPORT: CPT

## 2022-09-06 PROCEDURE — 86769 SARS-COV-2 COVID-19 ANTIBODY: CPT

## 2022-09-06 PROCEDURE — 86780 TREPONEMA PALLIDUM: CPT

## 2022-09-06 PROCEDURE — 36415 COLL VENOUS BLD VENIPUNCTURE: CPT

## 2022-09-06 PROCEDURE — 85025 COMPLETE CBC W/AUTO DIFF WBC: CPT

## 2022-09-06 PROCEDURE — G0463: CPT

## 2022-09-06 PROCEDURE — 86850 RBC ANTIBODY SCREEN: CPT

## 2022-09-06 RX ORDER — FEXOFENADINE HCL AND PSEUDOEPHEDRINE HCI 60; 120 MG/1; MG/1
1 TABLET, EXTENDED RELEASE ORAL
Qty: 0 | Refills: 0 | DISCHARGE

## 2022-09-06 RX ORDER — ERGOCALCIFEROL 1.25 MG/1
0 CAPSULE ORAL
Qty: 0 | Refills: 0 | DISCHARGE

## 2022-09-06 RX ADMIN — Medication 975 MILLIGRAM(S): at 09:40

## 2022-09-06 RX ADMIN — Medication 975 MILLIGRAM(S): at 03:12

## 2022-09-06 RX ADMIN — Medication 975 MILLIGRAM(S): at 08:42

## 2022-09-06 RX ADMIN — Medication 600 MILLIGRAM(S): at 06:26

## 2022-09-06 RX ADMIN — Medication 1 TABLET(S): at 11:18

## 2022-09-06 RX ADMIN — Medication 600 MILLIGRAM(S): at 12:00

## 2022-09-06 RX ADMIN — Medication 975 MILLIGRAM(S): at 04:00

## 2022-09-06 RX ADMIN — Medication 600 MILLIGRAM(S): at 00:30

## 2022-09-06 RX ADMIN — Medication 600 MILLIGRAM(S): at 05:39

## 2022-09-06 RX ADMIN — Medication 600 MILLIGRAM(S): at 11:19

## 2022-09-06 NOTE — PROGRESS NOTE ADULT - SUBJECTIVE AND OBJECTIVE BOX
OB Progress Note:  PPD#1    S: 38yo PPD#1 s/p . Pain controlled. Patient tolerating regular diet, voiding spontaneously, and ambulating without difficulty. Denies significant VB, chest pain, shortness of breath, n/v, light-headedness/dizziness.       O:  Vitals:  Vital Signs Last 24 Hrs  T(C): 36.7 (04 Sep 2022 22:00), Max: 37.2 (04 Sep 2022 12:30)  T(F): 98.1 (04 Sep 2022 22:00), Max: 99 (04 Sep 2022 12:30)  HR: 83 (04 Sep 2022 22:00) (68 - 125)  BP: 109/70 (04 Sep 2022 22:00) (97/54 - 136/70)  BP(mean): 83 (04 Sep 2022 22:00) (83 - 83)  RR: 18 (04 Sep 2022 22:) (16 - 18)  SpO2: 96% (04 Sep 2022 22:) (82% - 100%)    Parameters below as of 04 Sep 2022 13:48  Patient On (Oxygen Delivery Method): room air        MEDICATIONS  (STANDING):  acetaminophen     Tablet .. 975 milliGRAM(s) Oral <User Schedule>  diphtheria/tetanus/pertussis (acellular) Vaccine (ADAcel) 0.5 milliLiter(s) IntraMuscular once  ibuprofen  Tablet. 600 milliGRAM(s) Oral every 6 hours  influenza   Vaccine 0.5 milliLiter(s) IntraMuscular once  oxytocin Infusion 333.333 milliUNIT(s)/Min (1000 mL/Hr) IV Continuous <Continuous>  prenatal multivitamin 1 Tablet(s) Oral daily  sodium chloride 0.9% lock flush 3 milliLiter(s) IV Push every 8 hours      Labs:  Blood type: AB Positive  Rubella IgG: RPR: Negative                          12.7   15.98<H> >-----------< 218    (  @ 01:44 )             37.1                  Physical Exam:  General: No acute distress. Lying in bed, resting   Respiratory: No respiratory distress. Unlabored breathing   Abdomen: Soft. Non-tender. Non-distended. Fundus is firm  Extremities: No calf tenderness bilaterally    
S: Patient is doing well without complaints. Tolerates regular diet. She states lochia is in WNL. Ambulating without difficulty. Denies N/V. Voiding freely. Passing flatus. Pain well controlled with oral pain medications. Denies any HA/vision changes, CP/SOB, F/C/S.    O: Vital Signs Last 24 Hrs  T(C): 36.9 (06 Sep 2022 09:46), Max: 37 (05 Sep 2022 17:24)  T(F): 98.4 (06 Sep 2022 09:46), Max: 98.6 (05 Sep 2022 17:24)  HR: 72 (06 Sep 2022 09:46) (72 - 90)  BP: 97/59 (06 Sep 2022 09:46) (97/59 - 111/68)  BP(mean): --  RR: 18 (06 Sep 2022 09:46) (18 - 18)  SpO2: 96% (06 Sep 2022 09:46) (96% - 97%)    Parameters below as of 06 Sep 2022 09:46  Patient On (Oxygen Delivery Method): room air        Physical Exam:  General: NAD  Abdomen: soft, non-tender, non-distended, fundus firm  Vaginal: deferred  Ext: NTBL    Labs:                MEDICATIONS  (STANDING):  acetaminophen     Tablet .. 975 milliGRAM(s) Oral <User Schedule>  diphtheria/tetanus/pertussis (acellular) Vaccine (ADAcel) 0.5 milliLiter(s) IntraMuscular once  ibuprofen  Tablet. 600 milliGRAM(s) Oral every 6 hours  oxytocin Infusion 333.333 milliUNIT(s)/Min (1000 mL/Hr) IV Continuous <Continuous>  prenatal multivitamin 1 Tablet(s) Oral daily  sodium chloride 0.9% lock flush 3 milliLiter(s) IV Push every 8 hours

## 2022-09-06 NOTE — DISCHARGE NOTE OB - PATIENT PORTAL LINK FT
You can access the FollowMyHealth Patient Portal offered by [facility] by registering at the following website: [email or web address]. By joining [facility]’s FollowMyHealth portal, you will also be able to view your health information using other applications (apps) compatible with our system.

## 2022-09-06 NOTE — DISCHARGE NOTE OB - MEDICATION SUMMARY - MEDICATIONS TO TAKE
I will START or STAY ON the medications listed below when I get home from the hospital:    prenatal vitamin  -- once a day  -- Indication: For vitamin    Motrin 600 mg oral tablet  -- 1 tab(s) by mouth every 6 hours  -- Indication: For pain    Tylenol 500 mg oral tablet  -- 2 tab(s) by mouth every 6 hours  -- Indication: For pain    Ventolin HFA 90 mcg/inh inhalation aerosol  -- 2 puff(s) inhaled every 6 hours, As Needed  -- Indication: For asthma    Symbicort 160 mcg-4.5 mcg/inh inhalation aerosol  -- 2 puff(s) inhaled 2 times a day  -- Indication: For asthma

## 2022-09-06 NOTE — DISCHARGE NOTE OB - NS MD DC FALL RISK RISK
For information on Fall & Injury Prevention, visit: [email or web address] OR  [email or web address] OR  https://www.cdc.gov/steadi/patient.html

## 2022-09-06 NOTE — DISCHARGE NOTE OB - CARE PROVIDER_API CALL
Tyrell Serrano)  Obstetrics and Gynecology  [address] [place] [address], [address]  [place] [address]  Phone: [phone]  Fax: [phone]  Follow Up Time:

## 2023-02-04 NOTE — ASU DISCHARGE PLAN (ADULT/PEDIATRIC) - CARE PROVIDER_API CALL
Courtney Michele)  OBSN  General  [address] [place] [id number]  Phone: [phone]  Fax: [phone]  Follow Up Time:    no

## 2023-02-21 NOTE — OB RN PATIENT PROFILE - POST PARTUM DEPRESSION SCREEN OB 3
Alternative sent to pharmacy
Alternatives: Albuterol HFA, Pro Air respiclick and lebalbuterol HFA  
Patient states that she received a letter from her insurance company that they will no longer cover the albuterol inhaler she has been using. The letter does list three others that she can use. She does not need a refill until March, but wanted to talk to someone about this.
no

## 2023-02-24 NOTE — ASU PATIENT PROFILE, ADULT - NSALCOHOLPROBLEMSRELYN_GEN_A_CORE_SD
CHIEF COMPLAINT  Skin examination, history of skin cancer    HPI    Royce Magallon returns to Dermatology for skin examination.    History of NMSC: SCC  History of melanoma: No    History of AK's treated with LN and PDT (11/2021)  Patient stats that the procedure went okay, but some of the spot did not clear up. She treated her face with efudex in January with improvement.     Area(s) of concern:  1. None    The patient does wear sunscreen.    Skin cancer history:  -SCC Right forehead 05/2021 s/p Mohs Dr. Johnson   -SCC - left arm s/p  ED&C 02/2016     Current Outpatient Medications   Medication Sig Dispense Refill   • triamcinolone (ARISTOCORT) 0.1 % ointment Apply topically 2 times daily. Apply to thighs as needed for rash. 30 g 3   • clonazePAM (KlonoPIN) 1 MG tablet Take 1 tablet by mouth nightly as needed (sleep). 30 tablet 0   • tiZANidine (ZANAFLEX) 2 MG tablet TAKE ONE TABLET BY MOUTH EVERY SIX HOURS AS NEEDED for tension headaches 30 tablet 0   • QUEtiapine (SEROquel) 50 MG tablet TAKE 1 TABLET BY MOUTH DAILY 90 tablet 0   • pantoprazole (PROTONIX) 40 MG tablet TAKE 1 TABLET BY MOUTH  TWICE DAILY 180 tablet 3   • buPROPion XL (WELLBUTRIN XL) 300 MG 24 hr tablet Take 1 tablet by mouth every morning. 90 tablet 1   • fluorouracil (Efudex) 5 % cream Apply topically 2 times daily. Apply to nose, upper lip, left cheek, and left eyebrow for 2 weeks 40 g 0   • gabapentin (NEURONTIN) 100 MG capsule Take 1 capsule by mouth nightly. 90 capsule 3   • sumatriptan (IMITREX) 100 MG tablet TAKE 1 TABLET BY MOUTH AT  HEADACHE ONSET AND AGAIN 2  HOURS LATER IF NEEDED 27 tablet 0   • acetaminophen (TYLENOL) 500 MG tablet Take 500 mg by mouth as needed for Pain.     • Milk Thistle 1000 MG Cap Take 1,500 mg by mouth.     • cholecalciferol (VITAMIN D3) 1000 UNITS tablet Take 6,000 Units by mouth daily.     • CALCIUM-MAGNESIUM-ZINC PO Take 1 tablet by mouth daily.      • MAGNESIUM GLYCINATE PLUS PO Take 400 mg by mouth daily.        No current facility-administered medications for this visit.     ALLERGIES:   Allergen Reactions   • Aleve HIVES   • Flagyl [Metronidazole Hcl] VOMITING   • Ibuprofen HIVES       PHYSICAL EXAMINATION   Well developed, well nourished female in no acute distress.    The scalp, face, eyelids/conjunctiva, neck, chest, abdomen, back, upper and lower extremities, palms, soles and buttocks were examined.    -Previous site(s) of skin cancer examined and without evidence of recurrence  -tan macules scattered on sun exposed face, trunk and extremities  -Scaly red macule(s) on the proximal dorsal hands   -Eczematous pink plaques on the thighs     The remainder of the examination is normal.    IMPRESSION/PLAN:  1. History of non-melanoma skin cancer - no evidence of recurrence  -Continue monitoring for new or changing lesions  -Sun protection was reviewed (SPF 30 or higher and reapply every 2 hours)     2. Solar lentigines - discussed these are benign sun spots  -if they change shape or color they should be re-evaluated  -Sun protection was reviewed and it was recommended SPF 30 or higher; reapply every 2 hours, sooner if swimming or sweating.  Recommended a wide brimmed hat and sun protective clothing.    3. Actinic keratoses - Diagnosis discussed.  These are precancerous growths, that have a 1-10% chance of evolving in to SCC skin cancer, therefore treatment was recommended.    -For the more diffuse actinic keratoses,   Rx Efudex to use BID for 2 weeks on the hands.  Discussed expected redness, crusting, possible open sores and need for strict sun protection during treatment and while the area is healing.  If the area gets too irritated, discontinue and use vaseline until feeling better, then resume treatment.     4. Dermatitis - diagnosis discussed  -Rx triamcinolone ointment to use BID until clear then as needed for flares. Discussed risk of skin atrophy if used many months without a break. If it is not effective after  2 weeks the patient was advised to call me.    -Recommended moisturizing daily after showering and a list of moisturizers was given. May need to moisturize 2x/day. Recommended Dove sensitive bar soap, Cetaphil or CeraVe cleanser or Vanicream cleanser or bar soap.  -Discussed this rash will likely come and go.    Follow-up 1 year for skin exam, sooner if any concerns    On 2/24/2023, IDeepti MA scribed the services personally performed by Lisa K Muchard, MD  The documentation recorded by the scribe accurately and completely reflects the service(s) I personally performed and the decisions made by me.        no

## 2023-03-02 ENCOUNTER — APPOINTMENT (OUTPATIENT)
Dept: DERMATOLOGY | Facility: CLINIC | Age: 38
End: 2023-03-02
Payer: COMMERCIAL

## 2023-03-02 VITALS — WEIGHT: 138 LBS | BODY MASS INDEX: 23.69 KG/M2

## 2023-03-02 DIAGNOSIS — D22.9 MELANOCYTIC NEVI, UNSPECIFIED: ICD-10-CM

## 2023-03-02 DIAGNOSIS — C43.59 MALIGNANT MELANOMA OF OTHER PART OF TRUNK: ICD-10-CM

## 2023-03-02 PROCEDURE — 99214 OFFICE O/P EST MOD 30 MIN: CPT

## 2023-03-02 NOTE — HISTORY OF PRESENT ILLNESS
[FreeTextEntry1] : followup [de-identified] : PMH hx melanoma s/p WLE and negative SN biopsy (pt estimates, 15 y/ ago 2006?) \par \par Here for evaluation of moles - has one on abdomen she would like evaluated \par Also has psoriasis on back of scalp - uses clobetasol solution with good control on scalp and fluocinolone oil around ears\par \par Also with rash around mouth \par

## 2023-03-02 NOTE — PHYSICAL EXAM
[FreeTextEntry3] : several brown macules on trunk >> extremities with no concerning features on dermoscopy \par stuck on brown papule on mid abdomen\par scaly pink patches near oral commissures; no fissuring\par minimal scaly pink patch on posterior scalp

## 2023-03-02 NOTE — ASSESSMENT
[External notes review: [ enter provider(s) name(s) ] :____] : As part of my evaluation, I have reviewed prior clinical note(s) from provider(s) outside of my group practice. The name(s) are: [unfilled] [FreeTextEntry1] : Personal hx of melanoma s/p WLE and neg SLNB ~15 years ago\par - MAYA\par - recommend yearly skin checks\par \par Favor angular cheilitis (over perioral dermatitis)\par - New diagnosis, likely chronic, uncertain clinical course\par - elidel cream BID (given concern for perioral dermatitis)\par - add nystatin cream BID if develops fissuring in oral commissures\par \par Psoriasis\par Chronic, stable \par - continue clobetasol solution 1-2x/day with active flares\par - ADD ketoconazole 2% shampoo 2-3x/week\par - discussed other systemic options if worsening \par \par Benign appearing nevi\par - benign, reassurance, no intervention needed unless irritated\par - TBSE performed today - no concerning findings \par - TBSE every year with dermatologist\par - Recommend regular gynecologic and dental evaluations  \par - Photoprotection reviewed including sun-protective behaviors, protective clothing, and the use of OTC broad-spectrum SPF 30+ sunscreens was advised\par - RTC if develops lesions that are new, symptomatic (bleeding/itching), changing in size/color/shape\par

## 2023-03-03 RX ORDER — CLOBETASOL PROPIONATE 0.5 MG/ML
0.05 SOLUTION TOPICAL
Qty: 1 | Refills: 2 | Status: ACTIVE | COMMUNITY
Start: 2021-10-29 | End: 1900-01-01

## 2023-03-03 RX ORDER — FLUOCINOLONE ACETONIDE 0.11 MG/ML
0.01 OIL AURICULAR (OTIC)
Qty: 1 | Refills: 0 | Status: ACTIVE | COMMUNITY
Start: 2021-10-29 | End: 1900-01-01

## 2023-03-27 ENCOUNTER — APPOINTMENT (OUTPATIENT)
Dept: OTOLARYNGOLOGY | Facility: CLINIC | Age: 38
End: 2023-03-27
Payer: COMMERCIAL

## 2023-03-27 ENCOUNTER — NON-APPOINTMENT (OUTPATIENT)
Age: 38
End: 2023-03-27

## 2023-03-27 VITALS
WEIGHT: 134 LBS | BODY MASS INDEX: 22.88 KG/M2 | DIASTOLIC BLOOD PRESSURE: 78 MMHG | TEMPERATURE: 97.9 F | HEIGHT: 64 IN | SYSTOLIC BLOOD PRESSURE: 118 MMHG | HEART RATE: 71 BPM

## 2023-03-27 DIAGNOSIS — J30.9 ALLERGIC RHINITIS, UNSPECIFIED: ICD-10-CM

## 2023-03-27 DIAGNOSIS — H92.09 OTALGIA, UNSPECIFIED EAR: ICD-10-CM

## 2023-03-27 DIAGNOSIS — H92.10 OTORRHEA, UNSPECIFIED EAR: ICD-10-CM

## 2023-03-27 DIAGNOSIS — H61.22 IMPACTED CERUMEN, LEFT EAR: ICD-10-CM

## 2023-03-27 PROCEDURE — 69210 REMOVE IMPACTED EAR WAX UNI: CPT

## 2023-03-27 PROCEDURE — 99203 OFFICE O/P NEW LOW 30 MIN: CPT | Mod: 25

## 2023-03-27 RX ORDER — HYDROCORTISONE 10 MG/G
1 OINTMENT TOPICAL TWICE DAILY
Qty: 1 | Refills: 3 | Status: ACTIVE | COMMUNITY
Start: 2023-03-27 | End: 1900-01-01

## 2023-03-27 RX ORDER — MUPIROCIN 20 MG/G
2 OINTMENT TOPICAL TWICE DAILY
Qty: 1 | Refills: 3 | Status: ACTIVE | COMMUNITY
Start: 2023-03-27 | End: 1900-01-01

## 2023-03-27 NOTE — END OF VISIT
[FreeTextEntry3] : I personally saw and examined  the patient in detail.  I spoke to CLAIRE Crawford regarding the assessment and plan of care. I performed the procedures and relevant physical exam.  I have reviewed the above assessment and plan of care and I agree.  I have made changes to the body of the note wherever necessary and appropriate\par

## 2023-03-27 NOTE — HISTORY OF PRESENT ILLNESS
[de-identified] : pt with Hx psoriasis in the ears b/l - ears hurt when sleep on side, itching, does scratch them frequently \par uses a steroid oil drop which helps\par hearing is good\par no Vertigo, tinnitus, drainage or facial weakness.\par having some bleeding from right ear,feels a bump behind the ear as well \par

## 2023-03-27 NOTE — ASSESSMENT
[FreeTextEntry1] : 37 y/o F pt presents with ear itching and bleeding and hx of psoriasis in the ears b/l.\par On exam pt presents with L cerumen impaction and pustule in back of ear. cerumen removed with curette and suction\par -mupirocin for pustule in back of ear, hydrocortisone for back of ear - has psoriasis in crease \par -ear hygiene\par -discussed preventive measures and signs of accumulation\par

## 2023-03-27 NOTE — CONSULT LETTER
[Please see my note below.] : Please see my note below. [FreeTextEntry1] : Dear Dr. SESAR HA \par I had the pleasure of evaluating your patient MAYANK BRAROSO, thank you for allowing us to participate in their care. please see full note detailing our visit below.\par If you have any questions, please do not hesitate to call me and I would be happy to discuss further. \par \par Darren Stearns M.D.\par Attending Physician,  \par Department of Otolaryngology - Head and Neck Surgery\par [facility] \par Office: [phone]\par Fax: [phone]\par \par

## 2023-03-27 NOTE — PHYSICAL EXAM
[Normal] : mucosa is normal [Midline] : trachea located in midline position [de-identified] : L CI, cartilage on the back of external ear

## 2023-04-03 ENCOUNTER — APPOINTMENT (OUTPATIENT)
Dept: INTERNAL MEDICINE | Facility: CLINIC | Age: 38
End: 2023-04-03
Payer: COMMERCIAL

## 2023-04-03 VITALS
RESPIRATION RATE: 14 BRPM | WEIGHT: 135 LBS | BODY MASS INDEX: 23.05 KG/M2 | TEMPERATURE: 98.2 F | DIASTOLIC BLOOD PRESSURE: 76 MMHG | OXYGEN SATURATION: 99 % | HEIGHT: 64 IN | SYSTOLIC BLOOD PRESSURE: 104 MMHG | HEART RATE: 63 BPM

## 2023-04-03 DIAGNOSIS — Z80.0 FAMILY HISTORY OF MALIGNANT NEOPLASM OF DIGESTIVE ORGANS: ICD-10-CM

## 2023-04-03 DIAGNOSIS — Z00.00 ENCOUNTER FOR GENERAL ADULT MEDICAL EXAMINATION W/OUT ABNORMAL FINDINGS: ICD-10-CM

## 2023-04-03 DIAGNOSIS — K13.0 DISEASES OF LIPS: ICD-10-CM

## 2023-04-03 DIAGNOSIS — L40.9 PSORIASIS, UNSPECIFIED: ICD-10-CM

## 2023-04-03 PROCEDURE — 99395 PREV VISIT EST AGE 18-39: CPT

## 2023-04-03 RX ORDER — ALBUTEROL SULFATE 90 UG/1
108 (90 BASE) AEROSOL, METERED RESPIRATORY (INHALATION)
Qty: 1 | Refills: 3 | Status: DISCONTINUED | COMMUNITY
Start: 2021-03-23 | End: 2023-04-03

## 2023-04-03 RX ORDER — NYSTATIN 100000 [USP'U]/G
100000 CREAM TOPICAL
Qty: 1 | Refills: 1 | Status: DISCONTINUED | COMMUNITY
Start: 2023-03-02 | End: 2023-04-03

## 2023-04-03 RX ORDER — BUDESONIDE AND FORMOTEROL FUMARATE DIHYDRATE 160; 4.5 UG/1; UG/1
160-4.5 AEROSOL RESPIRATORY (INHALATION) TWICE DAILY
Qty: 1 | Refills: 3 | Status: DISCONTINUED | COMMUNITY
Start: 2021-03-10 | End: 2023-04-03

## 2023-04-03 RX ORDER — KETOCONAZOLE 20.5 MG/ML
2 SHAMPOO, SUSPENSION TOPICAL
Qty: 1 | Refills: 1 | Status: DISCONTINUED | COMMUNITY
Start: 2023-03-02 | End: 2023-04-03

## 2023-04-03 RX ORDER — PIMECROLIMUS 10 MG/G
1 CREAM TOPICAL
Qty: 2 | Refills: 1 | Status: DISCONTINUED | COMMUNITY
Start: 2023-03-02 | End: 2023-04-03

## 2023-04-03 NOTE — PAST MEDICAL HISTORY
[Menstruating] : menstruating [Definite ___ (Date)] : the last menstrual period was [unfilled] [Normal Amount/Duration] : it was of a normal amount and duration [Normal Duration] : the duration was normal [Regular Cycle Intervals] : have been regular [Total Preg ___] : G[unfilled] [Live Births ___] : P[unfilled]  [Full Term ___] : Full Term: [unfilled] [Premature ___] : Premature: [unfilled] [Abortions ___] : Abortions:[unfilled] [Living ___] : Living: [unfilled]

## 2023-04-03 NOTE — PHYSICAL EXAM
[No Acute Distress] : no acute distress [Well Nourished] : well nourished [Well Developed] : well developed [Well-Appearing] : well-appearing [Normal Sclera/Conjunctiva] : normal sclera/conjunctiva [PERRL] : pupils equal round and reactive to light [EOMI] : extraocular movements intact [Normal Outer Ear/Nose] : the outer ears and nose were normal in appearance [Normal Oropharynx] : the oropharynx was normal [No JVD] : no jugular venous distention [No Lymphadenopathy] : no lymphadenopathy [Supple] : supple [Thyroid Normal, No Nodules] : the thyroid was normal and there were no nodules present [No Respiratory Distress] : no respiratory distress  [No Accessory Muscle Use] : no accessory muscle use [Clear to Auscultation] : lungs were clear to auscultation bilaterally [Normal Rate] : normal rate  [Regular Rhythm] : with a regular rhythm [Normal S1, S2] : normal S1 and S2 [No Murmur] : no murmur heard [No Carotid Bruits] : no carotid bruits [No Abdominal Bruit] : a ~M bruit was not heard ~T in the abdomen [No Varicosities] : no varicosities [Pedal Pulses Present] : the pedal pulses are present [No Palpable Aorta] : no palpable aorta [No Edema] : there was no peripheral edema [No Extremity Clubbing/Cyanosis] : no extremity clubbing/cyanosis [Soft] : abdomen soft [Non Tender] : non-tender [Non-distended] : non-distended [No Masses] : no abdominal mass palpated [No HSM] : no HSM [Normal Bowel Sounds] : normal bowel sounds [Normal Posterior Cervical Nodes] : no posterior cervical lymphadenopathy [Normal Anterior Cervical Nodes] : no anterior cervical lymphadenopathy [No CVA Tenderness] : no CVA  tenderness [No Spinal Tenderness] : no spinal tenderness [No Joint Swelling] : no joint swelling [Grossly Normal Strength/Tone] : grossly normal strength/tone [Coordination Grossly Intact] : coordination grossly intact [No Focal Deficits] : no focal deficits [Normal Gait] : normal gait [Deep Tendon Reflexes (DTR)] : deep tendon reflexes were 2+ and symmetric [Normal Affect] : the affect was normal [Normal Insight/Judgement] : insight and judgment were intact [de-identified] : + psoriasis left ear, angular chelitis

## 2023-04-03 NOTE — HEALTH RISK ASSESSMENT
[Patient reported PAP Smear was abnormal] : Patient reported PAP Smear was abnormal [Good] : ~his/her~  mood as  good [Yes] : Yes [Monthly or less (1 pt)] : Monthly or less (1 point) [No falls in past year] : Patient reported no falls in the past year [0] : 2) Feeling down, depressed, or hopeless: Not at all (0) [None] : None [Employed] : employed [] :  [# Of Children ___] : has [unfilled] children [Fully functional (bathing, dressing, toileting, transferring, walking, feeding)] : Fully functional (bathing, dressing, toileting, transferring, walking, feeding) [Never] : Never [[id number]] : 0 [PapSmearDate] : 02/2022 [PapSmearComments] : to see GYN for repeat

## 2023-04-03 NOTE — PLAN
[FreeTextEntry1] : # HCM \par - routine blood work\par - pap UTD 2022 to follow GYN appt next week \par - flu - 2022 UTD \par - tdap - 2022\par - COVID x 2 no boosters \par \par # Angular Chelitis\par  - check vit b12 levels \par \par # Psoriasis, ear \par - may use topical steroids \par - advised to use no more than 2 weeks at a time\par

## 2023-04-03 NOTE — HISTORY OF PRESENT ILLNESS
[FreeTextEntry1] : CPE  [de-identified] : 39 yo F presenting for CPE. Patient is approx 4 months post partum. Full term . She stopped breast feeding 2 months post partum. Her menstrual periods are now regular. Her pregnancy was complicated by subchorionic hematoma, otherwise no other acute complications. She feels well has no complaints today other than some cracking/fissuring on corners of mouth. Has been using OTC topical steroid and nystatin but not helping. \par personal history of melanoma - saw DERM 3/2023 \par follows ENT for psoriasis of ears, improving with otic steroid drops\par follows GYN for hx fo abnormal pap\par \par \par \par \par

## 2023-04-04 LAB
25(OH)D3 SERPL-MCNC: 24.3 NG/ML
ALBUMIN SERPL ELPH-MCNC: 4.6 G/DL
ALP BLD-CCNC: 45 U/L
ALT SERPL-CCNC: 13 U/L
ANION GAP SERPL CALC-SCNC: 11 MMOL/L
AST SERPL-CCNC: 15 U/L
BASOPHILS # BLD AUTO: 0.08 K/UL
BASOPHILS NFR BLD AUTO: 1.2 %
BILIRUB SERPL-MCNC: 0.3 MG/DL
BUN SERPL-MCNC: 19 MG/DL
CALCIUM SERPL-MCNC: 10.2 MG/DL
CHLORIDE SERPL-SCNC: 104 MMOL/L
CHOLEST SERPL-MCNC: 188 MG/DL
CO2 SERPL-SCNC: 25 MMOL/L
CREAT SERPL-MCNC: 0.75 MG/DL
EGFR: 104 ML/MIN/1.73M2
EOSINOPHIL # BLD AUTO: 0.08 K/UL
EOSINOPHIL NFR BLD AUTO: 1.2 %
ESTIMATED AVERAGE GLUCOSE: 103 MG/DL
FOLATE SERPL-MCNC: 11.3 NG/ML
GLUCOSE SERPL-MCNC: 69 MG/DL
HBA1C MFR BLD HPLC: 5.2 %
HCT VFR BLD CALC: 42 %
HDLC SERPL-MCNC: 66 MG/DL
HGB BLD-MCNC: 14.1 G/DL
IMM GRANULOCYTES NFR BLD AUTO: 0.3 %
LDLC SERPL CALC-MCNC: 111 MG/DL
LYMPHOCYTES # BLD AUTO: 1.76 K/UL
LYMPHOCYTES NFR BLD AUTO: 27.4 %
MAN DIFF?: NORMAL
MCHC RBC-ENTMCNC: 29.4 PG
MCHC RBC-ENTMCNC: 33.6 GM/DL
MCV RBC AUTO: 87.7 FL
MONOCYTES # BLD AUTO: 0.57 K/UL
MONOCYTES NFR BLD AUTO: 8.9 %
NEUTROPHILS # BLD AUTO: 3.92 K/UL
NEUTROPHILS NFR BLD AUTO: 61 %
NONHDLC SERPL-MCNC: 122 MG/DL
PLATELET # BLD AUTO: 293 K/UL
POTASSIUM SERPL-SCNC: 4.2 MMOL/L
PROT SERPL-MCNC: 6.5 G/DL
RBC # BLD: 4.79 M/UL
RBC # FLD: 12.6 %
SODIUM SERPL-SCNC: 140 MMOL/L
TRIGL SERPL-MCNC: 54 MG/DL
TSH SERPL-ACNC: 0.97 UIU/ML
VIT B12 SERPL-MCNC: 433 PG/ML
WBC # FLD AUTO: 6.43 K/UL

## 2023-07-21 ENCOUNTER — NON-APPOINTMENT (OUTPATIENT)
Age: 38
End: 2023-07-21

## 2023-07-22 NOTE — H&P PST ADULT - PRO PAIN LIFE ADAPT
This encounter was created for [facility] orders only . decreased activity level/inability or reluctance to perform ADLs

## 2023-12-27 ENCOUNTER — APPOINTMENT (OUTPATIENT)
Dept: INTERNAL MEDICINE | Facility: CLINIC | Age: 38
End: 2023-12-27
Payer: COMMERCIAL

## 2023-12-27 DIAGNOSIS — U07.1 COVID-19: ICD-10-CM

## 2023-12-27 PROCEDURE — 99213 OFFICE O/P EST LOW 20 MIN: CPT | Mod: 95

## 2023-12-27 RX ORDER — BROMPHENIRAMINE MALEATE, PSEUDOEPHEDRINE HYDROCHLORIDE AND DEXTROMETHORPHAN HYDROBROMIDE 2; 10; 30 MG/5ML; MG/5ML; MG/5ML
2-30-10 SYRUP ORAL
Qty: 1 | Refills: 0 | Status: ACTIVE | COMMUNITY
Start: 2023-12-27 | End: 1900-01-01

## 2023-12-27 NOTE — PLAN
[FreeTextEntry1] : COVID19:  - Discussed treatment options, patient will continue supportive measures, including rest, fluids, Tylenol PRN.  Start bromfed DM for cough PRN  Quarantine measures discussed.

## 2023-12-27 NOTE — HISTORY OF PRESENT ILLNESS
[FreeTextEntry8] : This visit was provided via telehealth using real time 2-way audio/visual technology. The patient, MAYANK BARROSO was located at home, [address] [place] [address], at the time of the visit. The patient, MAYANK BARROSO and Provider participated in the telehealth encounter. The patient MAYANK BARROSO  provided verbal consent for this telehealth encounter.  38 year old female who tested positive for COVID yesterday.  Symptoms started yesterday. She has bidy aches, nasal congestion, and cough. She has had a dry lingering cough since last month, but since she was diagnosed with COVID yesterday, it has been slightly more productive. Denies any SOB or chest pain.

## 2023-12-31 ENCOUNTER — NON-APPOINTMENT (OUTPATIENT)
Age: 38
End: 2023-12-31

## 2023-12-31 DIAGNOSIS — J45.41 MODERATE PERSISTENT ASTHMA WITH (ACUTE) EXACERBATION: ICD-10-CM

## 2023-12-31 RX ORDER — BUDESONIDE AND FORMOTEROL FUMARATE DIHYDRATE 160; 4.5 UG/1; UG/1
160-4.5 AEROSOL RESPIRATORY (INHALATION) TWICE DAILY
Qty: 1 | Refills: 5 | Status: ACTIVE | COMMUNITY
Start: 2023-12-31 | End: 1900-01-01

## 2023-12-31 RX ORDER — PREDNISONE 10 MG/1
10 TABLET ORAL
Qty: 50 | Refills: 0 | Status: ACTIVE | COMMUNITY
Start: 2023-12-31 | End: 1900-01-01

## 2023-12-31 RX ORDER — AZELASTINE HYDROCHLORIDE 137 UG/1
0.1 SPRAY, METERED NASAL TWICE DAILY
Qty: 1 | Refills: 2 | Status: ACTIVE | COMMUNITY
Start: 2023-12-31 | End: 1900-01-01

## 2023-12-31 RX ORDER — BENZONATATE 200 MG/1
200 CAPSULE ORAL 3 TIMES DAILY
Qty: 90 | Refills: 5 | Status: ACTIVE | COMMUNITY
Start: 2023-12-31 | End: 1900-01-01

## 2024-01-04 ENCOUNTER — APPOINTMENT (OUTPATIENT)
Dept: HUMAN REPRODUCTION | Facility: CLINIC | Age: 39
End: 2024-01-04
Payer: COMMERCIAL

## 2024-01-04 PROCEDURE — 99215 OFFICE O/P EST HI 40 MIN: CPT

## 2024-01-04 PROCEDURE — 36415 COLL VENOUS BLD VENIPUNCTURE: CPT

## 2024-01-09 ENCOUNTER — APPOINTMENT (OUTPATIENT)
Dept: HUMAN REPRODUCTION | Facility: CLINIC | Age: 39
End: 2024-01-09
Payer: COMMERCIAL

## 2024-01-09 PROCEDURE — 76830 TRANSVAGINAL US NON-OB: CPT

## 2024-01-09 PROCEDURE — 36415 COLL VENOUS BLD VENIPUNCTURE: CPT

## 2024-01-09 PROCEDURE — S4042: CPT

## 2024-01-09 PROCEDURE — 99213 OFFICE O/P EST LOW 20 MIN: CPT | Mod: 25

## 2024-01-12 ENCOUNTER — APPOINTMENT (OUTPATIENT)
Dept: HUMAN REPRODUCTION | Facility: CLINIC | Age: 39
End: 2024-01-12
Payer: COMMERCIAL

## 2024-01-12 PROCEDURE — 76831 ECHO EXAM UTERUS: CPT

## 2024-01-12 PROCEDURE — 58999I: CUSTOM

## 2024-01-12 PROCEDURE — 58340 CATHETER FOR HYSTEROGRAPHY: CPT

## 2024-01-12 PROCEDURE — 99214 OFFICE O/P EST MOD 30 MIN: CPT | Mod: 25

## 2024-01-14 ENCOUNTER — APPOINTMENT (OUTPATIENT)
Dept: HUMAN REPRODUCTION | Facility: CLINIC | Age: 39
End: 2024-01-14
Payer: COMMERCIAL

## 2024-01-14 PROCEDURE — 99213 OFFICE O/P EST LOW 20 MIN: CPT | Mod: 25

## 2024-01-14 PROCEDURE — 76857 US EXAM PELVIC LIMITED: CPT

## 2024-01-14 PROCEDURE — 36415 COLL VENOUS BLD VENIPUNCTURE: CPT

## 2024-01-16 ENCOUNTER — APPOINTMENT (OUTPATIENT)
Dept: HUMAN REPRODUCTION | Facility: CLINIC | Age: 39
End: 2024-01-16
Payer: COMMERCIAL

## 2024-01-16 PROCEDURE — 76857 US EXAM PELVIC LIMITED: CPT

## 2024-01-16 PROCEDURE — 36415 COLL VENOUS BLD VENIPUNCTURE: CPT

## 2024-01-16 PROCEDURE — 99213 OFFICE O/P EST LOW 20 MIN: CPT | Mod: 25

## 2024-01-17 ENCOUNTER — APPOINTMENT (OUTPATIENT)
Dept: HUMAN REPRODUCTION | Facility: CLINIC | Age: 39
End: 2024-01-17

## 2024-01-18 ENCOUNTER — APPOINTMENT (OUTPATIENT)
Dept: HUMAN REPRODUCTION | Facility: CLINIC | Age: 39
End: 2024-01-18
Payer: COMMERCIAL

## 2024-01-18 PROCEDURE — 89260 SPERM ISOLATION SIMPLE: CPT

## 2024-01-18 PROCEDURE — 58322 ARTIFICIAL INSEMINATION: CPT

## 2024-02-01 ENCOUNTER — APPOINTMENT (OUTPATIENT)
Dept: HUMAN REPRODUCTION | Facility: CLINIC | Age: 39
End: 2024-02-01
Payer: COMMERCIAL

## 2024-02-01 PROCEDURE — 36415 COLL VENOUS BLD VENIPUNCTURE: CPT

## 2024-02-01 PROCEDURE — 99213 OFFICE O/P EST LOW 20 MIN: CPT | Mod: 25

## 2024-02-01 PROCEDURE — 76830 TRANSVAGINAL US NON-OB: CPT

## 2024-02-08 ENCOUNTER — APPOINTMENT (OUTPATIENT)
Dept: HUMAN REPRODUCTION | Facility: CLINIC | Age: 39
End: 2024-02-08
Payer: COMMERCIAL

## 2024-02-08 PROCEDURE — 36415 COLL VENOUS BLD VENIPUNCTURE: CPT

## 2024-02-08 PROCEDURE — 76817 TRANSVAGINAL US OBSTETRIC: CPT

## 2024-02-08 PROCEDURE — 99213 OFFICE O/P EST LOW 20 MIN: CPT | Mod: 25

## 2024-02-15 ENCOUNTER — APPOINTMENT (OUTPATIENT)
Dept: HUMAN REPRODUCTION | Facility: CLINIC | Age: 39
End: 2024-02-15
Payer: COMMERCIAL

## 2024-02-15 PROCEDURE — 36415 COLL VENOUS BLD VENIPUNCTURE: CPT

## 2024-02-15 PROCEDURE — 76817 TRANSVAGINAL US OBSTETRIC: CPT

## 2024-02-15 PROCEDURE — 99213 OFFICE O/P EST LOW 20 MIN: CPT | Mod: 25

## 2024-02-22 ENCOUNTER — APPOINTMENT (OUTPATIENT)
Dept: HUMAN REPRODUCTION | Facility: CLINIC | Age: 39
End: 2024-02-22
Payer: COMMERCIAL

## 2024-02-22 PROCEDURE — 76817 TRANSVAGINAL US OBSTETRIC: CPT

## 2024-02-22 PROCEDURE — 99213 OFFICE O/P EST LOW 20 MIN: CPT | Mod: 25

## 2024-02-29 ENCOUNTER — APPOINTMENT (OUTPATIENT)
Dept: HUMAN REPRODUCTION | Facility: CLINIC | Age: 39
End: 2024-02-29
Payer: COMMERCIAL

## 2024-02-29 PROCEDURE — 99213 OFFICE O/P EST LOW 20 MIN: CPT | Mod: 25

## 2024-02-29 PROCEDURE — 76817 TRANSVAGINAL US OBSTETRIC: CPT

## 2024-03-11 ENCOUNTER — APPOINTMENT (OUTPATIENT)
Dept: HUMAN REPRODUCTION | Facility: CLINIC | Age: 39
End: 2024-03-11
Payer: COMMERCIAL

## 2024-03-11 PROCEDURE — 36415 COLL VENOUS BLD VENIPUNCTURE: CPT

## 2024-03-11 PROCEDURE — 99213 OFFICE O/P EST LOW 20 MIN: CPT | Mod: 25

## 2024-03-11 PROCEDURE — 76817 TRANSVAGINAL US OBSTETRIC: CPT

## 2024-03-12 ENCOUNTER — RESULT REVIEW (OUTPATIENT)
Age: 39
End: 2024-03-12

## 2024-03-12 ENCOUNTER — APPOINTMENT (OUTPATIENT)
Dept: HUMAN REPRODUCTION | Facility: CLINIC | Age: 39
End: 2024-03-12
Payer: COMMERCIAL

## 2024-03-12 ENCOUNTER — APPOINTMENT (OUTPATIENT)
Dept: OBGYN | Facility: CLINIC | Age: 39
End: 2024-03-12

## 2024-03-12 PROCEDURE — 99215 OFFICE O/P EST HI 40 MIN: CPT | Mod: 25

## 2024-03-12 PROCEDURE — 36415 COLL VENOUS BLD VENIPUNCTURE: CPT

## 2024-03-12 PROCEDURE — 76817 TRANSVAGINAL US OBSTETRIC: CPT

## 2024-03-13 ENCOUNTER — APPOINTMENT (OUTPATIENT)
Dept: HUMAN REPRODUCTION | Facility: CLINIC | Age: 39
End: 2024-03-13

## 2024-04-02 ENCOUNTER — APPOINTMENT (OUTPATIENT)
Dept: HUMAN REPRODUCTION | Facility: CLINIC | Age: 39
End: 2024-04-02
Payer: COMMERCIAL

## 2024-04-02 PROCEDURE — 36415 COLL VENOUS BLD VENIPUNCTURE: CPT

## 2024-04-12 ENCOUNTER — APPOINTMENT (OUTPATIENT)
Dept: HUMAN REPRODUCTION | Facility: CLINIC | Age: 39
End: 2024-04-12

## 2024-04-12 ENCOUNTER — APPOINTMENT (OUTPATIENT)
Dept: HUMAN REPRODUCTION | Facility: CLINIC | Age: 39
End: 2024-04-12
Payer: COMMERCIAL

## 2024-04-12 PROCEDURE — 36415 COLL VENOUS BLD VENIPUNCTURE: CPT

## 2024-04-15 ENCOUNTER — APPOINTMENT (OUTPATIENT)
Dept: HUMAN REPRODUCTION | Facility: CLINIC | Age: 39
End: 2024-04-15
Payer: COMMERCIAL

## 2024-04-15 PROCEDURE — 99214 OFFICE O/P EST MOD 30 MIN: CPT | Mod: 25

## 2024-04-15 PROCEDURE — 58340 CATHETER FOR HYSTEROGRAPHY: CPT

## 2024-04-15 PROCEDURE — 58999I: CUSTOM

## 2024-04-15 PROCEDURE — 76831 ECHO EXAM UTERUS: CPT

## 2024-04-16 ENCOUNTER — APPOINTMENT (OUTPATIENT)
Dept: HUMAN REPRODUCTION | Facility: CLINIC | Age: 39
End: 2024-04-16

## 2024-04-29 ENCOUNTER — APPOINTMENT (OUTPATIENT)
Dept: HUMAN REPRODUCTION | Facility: CLINIC | Age: 39
End: 2024-04-29
Payer: COMMERCIAL

## 2024-04-29 PROCEDURE — 76830 TRANSVAGINAL US NON-OB: CPT

## 2024-04-29 PROCEDURE — 36415 COLL VENOUS BLD VENIPUNCTURE: CPT

## 2024-04-29 PROCEDURE — 99213 OFFICE O/P EST LOW 20 MIN: CPT | Mod: 25

## 2024-05-11 ENCOUNTER — APPOINTMENT (OUTPATIENT)
Dept: HUMAN REPRODUCTION | Facility: CLINIC | Age: 39
End: 2024-05-11
Payer: COMMERCIAL

## 2024-05-11 PROCEDURE — 36415 COLL VENOUS BLD VENIPUNCTURE: CPT

## 2024-05-11 PROCEDURE — 99213 OFFICE O/P EST LOW 20 MIN: CPT | Mod: 25

## 2024-05-11 PROCEDURE — 76830 TRANSVAGINAL US NON-OB: CPT

## 2024-05-11 PROCEDURE — S4042: CPT

## 2024-05-14 ENCOUNTER — APPOINTMENT (OUTPATIENT)
Dept: HUMAN REPRODUCTION | Facility: CLINIC | Age: 39
End: 2024-05-14
Payer: COMMERCIAL

## 2024-05-14 PROCEDURE — 76857 US EXAM PELVIC LIMITED: CPT

## 2024-05-14 PROCEDURE — 36415 COLL VENOUS BLD VENIPUNCTURE: CPT

## 2024-05-14 PROCEDURE — 99213 OFFICE O/P EST LOW 20 MIN: CPT | Mod: 25

## 2024-05-17 ENCOUNTER — APPOINTMENT (OUTPATIENT)
Dept: HUMAN REPRODUCTION | Facility: CLINIC | Age: 39
End: 2024-05-17
Payer: COMMERCIAL

## 2024-05-17 PROCEDURE — 99213 OFFICE O/P EST LOW 20 MIN: CPT | Mod: 25

## 2024-05-17 PROCEDURE — 36415 COLL VENOUS BLD VENIPUNCTURE: CPT

## 2024-05-17 PROCEDURE — 76857 US EXAM PELVIC LIMITED: CPT

## 2024-05-19 ENCOUNTER — APPOINTMENT (OUTPATIENT)
Dept: HUMAN REPRODUCTION | Facility: CLINIC | Age: 39
End: 2024-05-19
Payer: COMMERCIAL

## 2024-05-19 PROCEDURE — 76857 US EXAM PELVIC LIMITED: CPT

## 2024-05-19 PROCEDURE — 99213 OFFICE O/P EST LOW 20 MIN: CPT | Mod: 25

## 2024-05-19 PROCEDURE — 36415 COLL VENOUS BLD VENIPUNCTURE: CPT

## 2024-05-21 ENCOUNTER — APPOINTMENT (OUTPATIENT)
Dept: OPHTHALMOLOGY | Facility: CLINIC | Age: 39
End: 2024-05-21
Payer: COMMERCIAL

## 2024-05-21 ENCOUNTER — APPOINTMENT (OUTPATIENT)
Dept: HUMAN REPRODUCTION | Facility: CLINIC | Age: 39
End: 2024-05-21
Payer: COMMERCIAL

## 2024-05-21 ENCOUNTER — NON-APPOINTMENT (OUTPATIENT)
Age: 39
End: 2024-05-21

## 2024-05-21 PROCEDURE — 99213 OFFICE O/P EST LOW 20 MIN: CPT | Mod: 25

## 2024-05-21 PROCEDURE — 36415 COLL VENOUS BLD VENIPUNCTURE: CPT

## 2024-05-21 PROCEDURE — 92285 EXTERNAL OCULAR PHOTOGRAPHY: CPT

## 2024-05-21 PROCEDURE — 76857 US EXAM PELVIC LIMITED: CPT

## 2024-05-21 PROCEDURE — 92004 COMPRE OPH EXAM NEW PT 1/>: CPT

## 2024-05-22 ENCOUNTER — APPOINTMENT (OUTPATIENT)
Dept: HUMAN REPRODUCTION | Facility: CLINIC | Age: 39
End: 2024-05-22
Payer: COMMERCIAL

## 2024-05-22 PROCEDURE — 76857 US EXAM PELVIC LIMITED: CPT

## 2024-05-22 PROCEDURE — 36415 COLL VENOUS BLD VENIPUNCTURE: CPT

## 2024-05-22 PROCEDURE — 99213 OFFICE O/P EST LOW 20 MIN: CPT | Mod: 25

## 2024-05-23 ENCOUNTER — APPOINTMENT (OUTPATIENT)
Dept: HUMAN REPRODUCTION | Facility: CLINIC | Age: 39
End: 2024-05-23
Payer: COMMERCIAL

## 2024-05-23 PROCEDURE — 36415 COLL VENOUS BLD VENIPUNCTURE: CPT

## 2024-05-24 ENCOUNTER — APPOINTMENT (OUTPATIENT)
Dept: HUMAN REPRODUCTION | Facility: CLINIC | Age: 39
End: 2024-05-24
Payer: COMMERCIAL

## 2024-05-24 PROCEDURE — 89281 ASSIST OOCYTE FERTILIZATION: CPT

## 2024-05-24 PROCEDURE — 76948 ECHO GUIDE OVA ASPIRATION: CPT

## 2024-05-24 PROCEDURE — 89261 SPERM ISOLATION COMPLEX: CPT

## 2024-05-24 PROCEDURE — 58970 RETRIEVAL OF OOCYTE: CPT

## 2024-05-24 PROCEDURE — 89254 OOCYTE IDENTIFICATION: CPT

## 2024-05-24 PROCEDURE — 89250 CULTR OOCYTE/EMBRYO <4 DAYS: CPT

## 2024-05-25 ENCOUNTER — APPOINTMENT (OUTPATIENT)
Dept: HUMAN REPRODUCTION | Facility: CLINIC | Age: 39
End: 2024-05-25
Payer: COMMERCIAL

## 2024-05-27 PROCEDURE — 89253 EMBRYO HATCHING: CPT

## 2024-05-28 PROCEDURE — 89272 EXTENDED CULTURE OF OOCYTES: CPT

## 2024-05-29 PROCEDURE — 89342 STORAGE/YEAR EMBRYO(S): CPT

## 2024-05-29 PROCEDURE — 89258 CRYOPRESERVATION EMBRYO(S): CPT

## 2024-05-30 PROCEDURE — 89291 BIOPSY OOCYTE POLAR BODY: CPT

## 2024-05-30 PROCEDURE — 89258 CRYOPRESERVATION EMBRYO(S): CPT

## 2024-05-30 PROCEDURE — 89290 BIOPSY OOCYTE POLAR BODY <=5: CPT

## 2024-06-04 ENCOUNTER — APPOINTMENT (OUTPATIENT)
Dept: HUMAN REPRODUCTION | Facility: CLINIC | Age: 39
End: 2024-06-04
Payer: COMMERCIAL

## 2024-06-04 PROCEDURE — 36415 COLL VENOUS BLD VENIPUNCTURE: CPT

## 2024-06-04 PROCEDURE — 76857 US EXAM PELVIC LIMITED: CPT

## 2024-06-04 PROCEDURE — 99213 OFFICE O/P EST LOW 20 MIN: CPT | Mod: 25

## 2024-06-05 ENCOUNTER — APPOINTMENT (OUTPATIENT)
Dept: HUMAN REPRODUCTION | Facility: CLINIC | Age: 39
End: 2024-06-05
Payer: COMMERCIAL

## 2024-06-05 PROCEDURE — 99213 OFFICE O/P EST LOW 20 MIN: CPT

## 2024-07-09 ENCOUNTER — APPOINTMENT (OUTPATIENT)
Dept: OTOLARYNGOLOGY | Facility: CLINIC | Age: 39
End: 2024-07-09
Payer: COMMERCIAL

## 2024-07-09 VITALS
HEART RATE: 74 BPM | TEMPERATURE: 97.6 F | BODY MASS INDEX: 22.2 KG/M2 | WEIGHT: 130 LBS | SYSTOLIC BLOOD PRESSURE: 123 MMHG | HEIGHT: 64 IN | DIASTOLIC BLOOD PRESSURE: 83 MMHG

## 2024-07-09 DIAGNOSIS — L29.9 PRURITUS, UNSPECIFIED: ICD-10-CM

## 2024-07-09 DIAGNOSIS — L30.9 DERMATITIS, UNSPECIFIED: ICD-10-CM

## 2024-07-09 PROCEDURE — 99214 OFFICE O/P EST MOD 30 MIN: CPT

## 2024-07-09 RX ORDER — MOMETASONE FUROATE 1 MG/G
0.1 CREAM TOPICAL
Qty: 1 | Refills: 0 | Status: ACTIVE | COMMUNITY
Start: 2024-07-09 | End: 1900-01-01

## 2024-07-09 RX ORDER — CIPROFLOXACIN AND DEXAMETHASONE 3; 1 MG/ML; MG/ML
0.3-0.1 SUSPENSION/ DROPS AURICULAR (OTIC)
Qty: 1 | Refills: 1 | Status: ACTIVE | COMMUNITY
Start: 2024-07-09 | End: 1900-01-01

## 2024-09-30 ENCOUNTER — APPOINTMENT (OUTPATIENT)
Dept: HUMAN REPRODUCTION | Facility: CLINIC | Age: 39
End: 2024-09-30
Payer: COMMERCIAL

## 2024-09-30 PROCEDURE — 36415 COLL VENOUS BLD VENIPUNCTURE: CPT

## 2024-09-30 PROCEDURE — 99213 OFFICE O/P EST LOW 20 MIN: CPT | Mod: 25

## 2024-09-30 PROCEDURE — 76857 US EXAM PELVIC LIMITED: CPT

## 2024-09-30 PROCEDURE — S4042: CPT

## 2024-10-08 ENCOUNTER — APPOINTMENT (OUTPATIENT)
Dept: HUMAN REPRODUCTION | Facility: CLINIC | Age: 39
End: 2024-10-08
Payer: COMMERCIAL

## 2024-10-08 ENCOUNTER — NON-APPOINTMENT (OUTPATIENT)
Age: 39
End: 2024-10-08

## 2024-10-08 PROCEDURE — 76857 US EXAM PELVIC LIMITED: CPT

## 2024-10-08 PROCEDURE — 36415 COLL VENOUS BLD VENIPUNCTURE: CPT

## 2024-10-08 PROCEDURE — 99213 OFFICE O/P EST LOW 20 MIN: CPT | Mod: 25

## 2024-10-10 ENCOUNTER — LABORATORY RESULT (OUTPATIENT)
Age: 39
End: 2024-10-10

## 2024-10-10 ENCOUNTER — APPOINTMENT (OUTPATIENT)
Dept: PULMONOLOGY | Facility: CLINIC | Age: 39
End: 2024-10-10
Payer: COMMERCIAL

## 2024-10-10 VITALS
HEART RATE: 99 BPM | HEIGHT: 64 IN | DIASTOLIC BLOOD PRESSURE: 70 MMHG | OXYGEN SATURATION: 98 % | RESPIRATION RATE: 16 BRPM | BODY MASS INDEX: 22.88 KG/M2 | WEIGHT: 134 LBS | SYSTOLIC BLOOD PRESSURE: 120 MMHG | TEMPERATURE: 97 F

## 2024-10-10 DIAGNOSIS — Z72.820 SLEEP DEPRIVATION: ICD-10-CM

## 2024-10-10 DIAGNOSIS — J45.901 ACUTE BRONCHITIS, UNSPECIFIED: ICD-10-CM

## 2024-10-10 DIAGNOSIS — U07.1 COVID-19: ICD-10-CM

## 2024-10-10 DIAGNOSIS — L40.9 PSORIASIS, UNSPECIFIED: ICD-10-CM

## 2024-10-10 DIAGNOSIS — Z82.49 FAMILY HISTORY OF ISCHEMIC HEART DISEASE AND OTHER DISEASES OF THE CIRCULATORY SYSTEM: ICD-10-CM

## 2024-10-10 DIAGNOSIS — J30.9 ALLERGIC RHINITIS, UNSPECIFIED: ICD-10-CM

## 2024-10-10 DIAGNOSIS — Z80.42 FAMILY HISTORY OF MALIGNANT NEOPLASM OF PROSTATE: ICD-10-CM

## 2024-10-10 DIAGNOSIS — J20.9 ACUTE BRONCHITIS, UNSPECIFIED: ICD-10-CM

## 2024-10-10 LAB
25(OH)D3 SERPL-MCNC: 27.2 NG/ML
A1AT SERPL-MCNC: 189 MG/DL
BASOPHILS # BLD AUTO: 0.04 K/UL
BASOPHILS NFR BLD AUTO: 0.7 %
EOSINOPHIL # BLD AUTO: 0.08 K/UL
EOSINOPHIL NFR BLD AUTO: 1.4 %
HCT VFR BLD CALC: 40.3 %
HGB BLD-MCNC: 13.1 G/DL
IMM GRANULOCYTES NFR BLD AUTO: 0.2 %
LYMPHOCYTES # BLD AUTO: 1.83 K/UL
LYMPHOCYTES NFR BLD AUTO: 32.2 %
MAN DIFF?: NORMAL
MCHC RBC-ENTMCNC: 28.4 PG
MCHC RBC-ENTMCNC: 32.5 GM/DL
MCV RBC AUTO: 87.4 FL
MONOCYTES # BLD AUTO: 0.45 K/UL
MONOCYTES NFR BLD AUTO: 7.9 %
NEUTROPHILS # BLD AUTO: 3.28 K/UL
NEUTROPHILS NFR BLD AUTO: 57.6 %
PLATELET # BLD AUTO: 281 K/UL
RBC # BLD: 4.61 M/UL
RBC # FLD: 12.6 %
WBC # FLD AUTO: 5.69 K/UL

## 2024-10-10 PROCEDURE — 95012 NITRIC OXIDE EXP GAS DETER: CPT

## 2024-10-10 PROCEDURE — 94060 EVALUATION OF WHEEZING: CPT

## 2024-10-10 PROCEDURE — 99204 OFFICE O/P NEW MOD 45 MIN: CPT | Mod: 25

## 2024-10-10 PROCEDURE — 94618 PULMONARY STRESS TESTING: CPT

## 2024-10-10 PROCEDURE — ZZZZZ: CPT

## 2024-10-10 PROCEDURE — 94729 DIFFUSING CAPACITY: CPT

## 2024-10-10 PROCEDURE — 94727 GAS DIL/WSHOT DETER LNG VOL: CPT

## 2024-10-10 PROCEDURE — 71046 X-RAY EXAM CHEST 2 VIEWS: CPT

## 2024-10-10 RX ORDER — BECLOMETHASONE DIPROPIONATE 80 UG/1
80 AEROSOL, METERED NASAL
Qty: 1 | Refills: 2 | Status: ACTIVE | COMMUNITY
Start: 2024-10-10 | End: 1900-01-01

## 2024-10-10 RX ORDER — AZITHROMYCIN 500 MG/1
500 TABLET, FILM COATED ORAL DAILY
Qty: 5 | Refills: 0 | Status: ACTIVE | COMMUNITY
Start: 2024-10-10 | End: 1900-01-01

## 2024-10-11 ENCOUNTER — APPOINTMENT (OUTPATIENT)
Dept: HUMAN REPRODUCTION | Facility: CLINIC | Age: 39
End: 2024-10-11
Payer: COMMERCIAL

## 2024-10-11 LAB
24R-OH-CALCIDIOL SERPL-MCNC: 93.2 PG/ML
M PNEUMO IGM SER QL IA: 0.13 INDEX
MYCOPLASMA AG SPEC QL: NEGATIVE

## 2024-10-11 PROCEDURE — 36415 COLL VENOUS BLD VENIPUNCTURE: CPT

## 2024-10-11 PROCEDURE — 99213 OFFICE O/P EST LOW 20 MIN: CPT | Mod: 25

## 2024-10-11 PROCEDURE — 76857 US EXAM PELVIC LIMITED: CPT

## 2024-10-12 LAB
A ALTERNATA IGE QN: <0.1 KUA/L
A ALTERNATA IGE QN: <0.1 KUA/L
A FUMIGATUS IGE QN: <0.1 KUA/L
A FUMIGATUS IGE QN: <0.1 KUA/L
ALMOND IGE QN: <0.1 KUA/L
BERMUDA GRASS IGE QN: <0.1 KUA/L
BOXELDER IGE QN: <0.1 KUA/L
BRAZIL NUT IGE QN: <0.1 KUA/L
C ALBICANS IGE QN: <0.1 KUA/L
C HERBARUM IGE QN: <0.1 KUA/L
C HERBARUM IGE QN: <0.1 KUA/L
CALIF WALNUT IGE QN: <0.1 KUA/L
CASHEW NUT IGE QN: <0.1 KUA/L
CAT DANDER IGE QN: <0.1 KUA/L
CAT DANDER IGE QN: <0.1 KUA/L
CMN PIGWEED IGE QN: <0.1 KUA/L
CODFISH IGE QN: <0.1 KUA/L
COMMON RAGWEED IGE QN: <0.1 KUA/L
COMMON RAGWEED IGE QN: <0.1 KUA/L
COTTONWOOD IGE QN: <0.1 KUA/L
COW MILK IGE QN: <0.1 KUA/L
D FARINAE IGE QN: <0.1 KUA/L
D FARINAE IGE QN: <0.1 KUA/L
D PTERONYSS IGE QN: <0.1 KUA/L
D PTERONYSS IGE QN: <0.1 KUA/L
DEPRECATED A ALTERNATA IGE RAST QL: 0
DEPRECATED A ALTERNATA IGE RAST QL: 0
DEPRECATED A FUMIGATUS IGE RAST QL: 0
DEPRECATED A FUMIGATUS IGE RAST QL: 0
DEPRECATED ALMOND IGE RAST QL: 0
DEPRECATED BERMUDA GRASS IGE RAST QL: 0
DEPRECATED BOXELDER IGE RAST QL: 0
DEPRECATED BRAZIL NUT IGE RAST QL: 0
DEPRECATED C ALBICANS IGE RAST QL: 0
DEPRECATED C HERBARUM IGE RAST QL: 0
DEPRECATED C HERBARUM IGE RAST QL: 0
DEPRECATED CASHEW NUT IGE RAST QL: 0
DEPRECATED CAT DANDER IGE RAST QL: 0
DEPRECATED CAT DANDER IGE RAST QL: 0
DEPRECATED CODFISH IGE RAST QL: 0
DEPRECATED COMMON PIGWEED IGE RAST QL: 0
DEPRECATED COMMON RAGWEED IGE RAST QL: 0
DEPRECATED COMMON RAGWEED IGE RAST QL: 0
DEPRECATED COTTONWOOD IGE RAST QL: 0
DEPRECATED COW MILK IGE RAST QL: 0
DEPRECATED D FARINAE IGE RAST QL: 0
DEPRECATED D FARINAE IGE RAST QL: 0
DEPRECATED D PTERONYSS IGE RAST QL: 0
DEPRECATED D PTERONYSS IGE RAST QL: 0
DEPRECATED DOG DANDER IGE RAST QL: 0
DEPRECATED DOG DANDER IGE RAST QL: 0
DEPRECATED DUCK FEATHER IGE RAST QL: 0
DEPRECATED EGG WHITE IGE RAST QL: 0
DEPRECATED GOOSE FEATHER IGE RAST QL: 0
DEPRECATED GOOSEFOOT IGE RAST QL: 0
DEPRECATED HAZELNUT IGE RAST QL: 0
DEPRECATED LONDON PLANE IGE RAST QL: 0
DEPRECATED M RACEMOSUS IGE RAST QL: 0
DEPRECATED MOUSE URINE PROT IGE RAST QL: 0
DEPRECATED MUGWORT IGE RAST QL: 0
DEPRECATED P NOTATUM IGE RAST QL: 0
DEPRECATED PEANUT IGE RAST QL: 0
DEPRECATED RED CEDAR IGE RAST QL: 0
DEPRECATED ROACH IGE RAST QL: 0
DEPRECATED ROACH IGE RAST QL: 0
DEPRECATED SALMON IGE RAST QL: 0
DEPRECATED SCALLOP IGE RAST QL: <0.1 KUA/L
DEPRECATED SESAME SEED IGE RAST QL: 0
DEPRECATED SHEEP SORREL IGE RAST QL: 0
DEPRECATED SHRIMP IGE RAST QL: 0
DEPRECATED SILVER BIRCH IGE RAST QL: 0
DEPRECATED SOYBEAN IGE RAST QL: 0
DEPRECATED TIMOTHY IGE RAST QL: 0
DEPRECATED TIMOTHY IGE RAST QL: 0
DEPRECATED TUNA IGE RAST QL: 0
DEPRECATED WALNUT IGE RAST QL: 0
DEPRECATED WHEAT IGE RAST QL: 0
DEPRECATED WHITE ASH IGE RAST QL: 0
DEPRECATED WHITE OAK IGE RAST QL: 0
DEPRECATED WHITE OAK IGE RAST QL: 0
DOG DANDER IGE QN: <0.1 KUA/L
DOG DANDER IGE QN: <0.1 KUA/L
DUCK FEATHER IGE QN: <0.1 KUA/L
EGG WHITE IGE QN: <0.1 KUA/L
GOOSE FEATHER IGE QN: <0.1 KUA/L
GOOSEFOOT IGE QN: <0.1 KUA/L
HAZELNUT IGE QN: <0.1 KUA/L
LONDON PLANE IGE QN: <0.1 KUA/L
M PNEUMO IGG SER IA-ACNC: POSITIVE
M PNEUMO IGG SER QL IA: 1.45 INDEX
M RACEMOSUS IGE QN: <0.1 KUA/L
MOUSE URINE PROT IGE QN: <0.1 KUA/L
MUGWORT IGE QN: <0.1 KUA/L
MULBERRY (T70) CLASS: 0
MULBERRY (T70) CONC: <0.1 KUA/L
P NOTATUM IGE QN: <0.1 KUA/L
PEANUT IGE QN: <0.1 KUA/L
RED CEDAR IGE QN: <0.1 KUA/L
ROACH IGE QN: <0.1 KUA/L
ROACH IGE QN: <0.1 KUA/L
SALMON IGE QN: <0.1 KUA/L
SCALLOP IGE QN: 0
SCALLOP IGE QN: <0.1 KUA/L
SESAME SEED IGE QN: <0.1 KUA/L
SHEEP SORREL IGE QN: <0.1 KUA/L
SILVER BIRCH IGE QN: <0.1 KUA/L
SOYBEAN IGE QN: <0.1 KUA/L
TIMOTHY IGE QN: <0.1 KUA/L
TIMOTHY IGE QN: <0.1 KUA/L
TOTAL IGE SMQN RAST: 7 KU/L
TOTAL IGE SMQN RAST: 7 KU/L
TREE ALLERG MIX1 IGE QL: 0
TUNA IGE QN: <0.1 KUA/L
WALNUT IGE QN: <0.1 KUA/L
WHEAT IGE QN: <0.1 KUA/L
WHITE ASH IGE QN: <0.1 KUA/L
WHITE ELM IGE QN: 0
WHITE ELM IGE QN: <0.1 KUA/L
WHITE OAK IGE QN: <0.1 KUA/L
WHITE OAK IGE QN: <0.1 KUA/L

## 2024-10-15 ENCOUNTER — APPOINTMENT (OUTPATIENT)
Dept: HUMAN REPRODUCTION | Facility: CLINIC | Age: 39
End: 2024-10-15
Payer: COMMERCIAL

## 2024-10-15 LAB
B PERT IGG SER-ACNC: 4.9 INDEX
B PERT IGM SER-ACNC: <1 INDEX

## 2024-10-15 PROCEDURE — 36415 COLL VENOUS BLD VENIPUNCTURE: CPT

## 2024-10-16 ENCOUNTER — APPOINTMENT (OUTPATIENT)
Dept: HUMAN REPRODUCTION | Facility: CLINIC | Age: 39
End: 2024-10-16
Payer: COMMERCIAL

## 2024-10-16 LAB
A1AT PHENOTYP SERPL-IMP: NORMAL
A1AT SERPL-MCNC: 188 MG/DL

## 2024-10-16 PROCEDURE — 76998 US GUIDE INTRAOP: CPT

## 2024-10-16 PROCEDURE — 89255 PREPARE EMBRYO FOR TRANSFER: CPT

## 2024-10-16 PROCEDURE — 58974 EMBRYO TRANSFER INTRAUTERINE: CPT

## 2024-10-16 PROCEDURE — 89352 THAWING CRYOPRESRVED EMBRYO: CPT

## 2024-10-16 PROCEDURE — 89398A: CUSTOM

## 2024-10-17 ENCOUNTER — APPOINTMENT (OUTPATIENT)
Dept: HUMAN REPRODUCTION | Facility: CLINIC | Age: 39
End: 2024-10-17
Payer: COMMERCIAL

## 2024-10-18 NOTE — H&P PST ADULT - NSICDXNOFAMILYHX_GEN_ALL_CORE
Head: normocephalic, atraumatic. Face: within normal limits, no involuntary movements and expected facial expressions. Ears: external ears within normal limits
<-- Click to add NO pertinent Family History

## 2024-10-28 ENCOUNTER — APPOINTMENT (OUTPATIENT)
Dept: HUMAN REPRODUCTION | Facility: CLINIC | Age: 39
End: 2024-10-28
Payer: COMMERCIAL

## 2024-10-28 PROCEDURE — 36415 COLL VENOUS BLD VENIPUNCTURE: CPT

## 2024-10-30 ENCOUNTER — APPOINTMENT (OUTPATIENT)
Dept: HUMAN REPRODUCTION | Facility: CLINIC | Age: 39
End: 2024-10-30
Payer: COMMERCIAL

## 2024-10-30 PROCEDURE — 36415 COLL VENOUS BLD VENIPUNCTURE: CPT

## 2024-11-05 ENCOUNTER — APPOINTMENT (OUTPATIENT)
Dept: HUMAN REPRODUCTION | Facility: CLINIC | Age: 39
End: 2024-11-05
Payer: COMMERCIAL

## 2024-11-05 PROCEDURE — 99459 PELVIC EXAMINATION: CPT

## 2024-11-05 PROCEDURE — 99213 OFFICE O/P EST LOW 20 MIN: CPT | Mod: 25

## 2024-11-05 PROCEDURE — 36415 COLL VENOUS BLD VENIPUNCTURE: CPT

## 2024-11-05 PROCEDURE — 76817 TRANSVAGINAL US OBSTETRIC: CPT

## 2024-11-14 ENCOUNTER — APPOINTMENT (OUTPATIENT)
Dept: HUMAN REPRODUCTION | Facility: CLINIC | Age: 39
End: 2024-11-14
Payer: COMMERCIAL

## 2024-11-14 PROCEDURE — 99459 PELVIC EXAMINATION: CPT

## 2024-11-14 PROCEDURE — 36415 COLL VENOUS BLD VENIPUNCTURE: CPT

## 2024-11-14 PROCEDURE — 99213 OFFICE O/P EST LOW 20 MIN: CPT | Mod: 25

## 2024-11-14 PROCEDURE — 76817 TRANSVAGINAL US OBSTETRIC: CPT

## 2024-11-26 ENCOUNTER — APPOINTMENT (OUTPATIENT)
Dept: OBGYN | Facility: CLINIC | Age: 39
End: 2024-11-26
Payer: COMMERCIAL

## 2024-11-26 PROCEDURE — 36415 COLL VENOUS BLD VENIPUNCTURE: CPT

## 2024-11-26 PROCEDURE — 76817 TRANSVAGINAL US OBSTETRIC: CPT

## 2024-11-26 PROCEDURE — 99203 OFFICE O/P NEW LOW 30 MIN: CPT | Mod: 25

## 2024-12-10 ENCOUNTER — APPOINTMENT (OUTPATIENT)
Dept: PULMONOLOGY | Facility: CLINIC | Age: 39
End: 2024-12-10

## 2024-12-19 ENCOUNTER — APPOINTMENT (OUTPATIENT)
Dept: OBGYN | Facility: CLINIC | Age: 39
End: 2024-12-19
Payer: COMMERCIAL

## 2024-12-19 PROCEDURE — 0502F SUBSEQUENT PRENATAL CARE: CPT

## 2024-12-19 PROCEDURE — 76813 OB US NUCHAL MEAS 1 GEST: CPT

## 2024-12-19 PROCEDURE — 36415 COLL VENOUS BLD VENIPUNCTURE: CPT

## 2024-12-19 PROCEDURE — 99213 OFFICE O/P EST LOW 20 MIN: CPT

## 2025-01-14 NOTE — PRE-ANESTHESIA EVALUATION ADULT - NSANTHASARD_GEN_ALL_CORE
I have reviewed and agree with my Chiropractic Technician's note.    CHIEF COMPLAINT: Neck pain, upper back pain, Headaches       SOCIAL HISTORY:  Patient completed Chiropractic Patient History and Chiropractic Systems Review.  These were reviewed with the patient.    SUBJECTIVE (cont'd): Suzette reports an increase in her neck and upper back symptoms since last patient visit.  States she has noticed increased frequency of her headaches over the past several weeks.  Stating headaches 1-2 times per week.  Reports tightness in the upper trapezius and between the shoulder blades bilaterally.  Denies current headache.  Request evaluation and treatment.      OBJECTIVE FINDINGS:   (Cervical spine) Cervical spine facet joint function is within normal limits except for her C4-5 facet joints that exhibited limited passive range of motion and segmental restriction with tenderness upon palpation. The following muscles were examined for normal flexibility and tone: right and left paraspinal muscles; right and left upper trapezius muscle: right and left scalene muscle; right and left levator scapulae muscle;  right and left suboccipital muscle. These muscles were within normal limits except for her suboccipitals, upper trapezius, left levator scapulae muscles that exhibited limited flexibility and were hypertonic at rest.    (Thoracic spine) Thoracic spine facet joint function is within normal limits except for her T2-T4 facet joints that exhibited limited passive range of motion and segmental restriction and tenderness upon palpation. The following muscles were examined for normal flexibility and tone: right and left rhomboid muscle; right and left serratus muscle; thoracic paraspinal muscles. These muscles were within normal limits except for her rhomboids that exhibited limited flexibility and abnormal resting tone.  Posture/Observation/Gait:  Last menstrual period 06/24/2024.  There are no gait abnormalities noted upon  observation. Gait and station are within normal limits.  Observation:   Cervical: forward head  Shoulder: rounded  Spine: increased thoracic kyphosis and increased lumbar lordosis  Comments / Details: Pain and difficulty with mobility tasks  Limited hamstring, hip flexor, and glut muscle length  Tightness and tenderness with palpation throughout gluts bilaterally  Skin:  There are no lesions or abnormalities detected throughout the region(s) of complaint.   Muscle Spasm:  Mild to moderate muscle spasm is noted throughout the suboccipital, upper trapezius, levator scapulae, muscles. Multiple trigger points noted throughout these muscles.   Orthopedic/Neurological tests: None performed today  Assessment:   1. Chronic neck pain    2. Tension headache    3. Myofascial pain    4. Cervical somatic dysfunction    5. Somatic dysfunction of thoracic region    6. Cervicalgia       Complicating Factors/Co-morbidities:   None  I have reviewed the family history and social history as listed in the medical record as obtained by my support staff and agree with their documentation.  I have reviewed the patient's pertinent medical records.  PLAN:  Patient was evaluated and then treated with manipulation to her cervical facet joints via grade 2 mobilization technique; to her cervical, thoracic facet joints via Instrument Assisted technique; to her thoracic facet joints via diversified manipulation technique  to improve function and passive range of motion of facet and/or joints. Patient also treated with contract/relax stretch to muscle noted as taut in objective findings to improve flexibility and decrease strain to spinal structures.  Patient reports no adverse response to manipulation.   Therapeutic Exercise/Rehab/Modalities performed today: Moist heat was applied by my assistant preceding skilled, supervised treatment.  It was applied over the paraspinal musculature adjacent to the treated joints to reduce hypertonicity, promote  analgesia, and to increase blood flow in the surrounding musculature.  Due to bundling of services, a charge will not be added to the patient's account.  Myofascial release and trigger point work performed with Proflex percussive device to her cervical paraspinal,  and upper thoracic paraspinal musculature..    Patient reports no adverse response to treatment.   Patient Instruction/Education:   Patient was educated in the nature of condition and likely pain generators as well as plan of care to resolve symptoms and improve muscular and skeletal function.  Patient noted verbal understanding of condition and is agreeable to plan of care. Patient stated understanding of, and was in agreement with, the discussed instructions.  Goals of Care: Goal of care is to improve muscular and skeletal function and provide symptom relief.   Additional Goals Include and to be obtained by end of this plan of care.   To be obtained by end of this plan of care:  Patient independent with modified and progressed home exercise program.  Patient will decrease symptoms by 60-80% of current Visual Analog Pain Scale Score.  Patient’s active range-of-motion will be within normal limits with no/minimal pain.   Patient will be able to tolerate standing activities for greater than or equal to 90 minutes without pain/difficulty.  Patient will demonstrate proper body mechanics for sitting and standing.  Patient will be able to tolerate sitting activities for greater than or equal to 90 minutes without pain/difficulty.  Patient will be able to sleep/rollover in bed without pain or difficulty.   Patient will be able to walk without pain or difficulty for greater than or equal to 45 minutes.   Patient will be able to bend and lift for activities of daily living and instrumental activities of daily living completion at home and work without pain/difficulty.  Patient’s DOD/VA pain questionnaire will be decreased by 50-70%.     Ice Instruction: Proper and  safe icing instructions were given to the patient and reviewed.   I recommend ice application at home, to be applied every 2-3  times per day or as needed.  The therapy should be applied for 10-20 minutes on affected area-then off affected area for 60 minutes.  A thin, preferably damp, towel should be inserted between the ice pack and the skin.    Other treatment options discussed with patient: None  Plan of care:  Patient is advised to schedule treatment 2x/week for 2-4 weeks, 1x/week for 2-4 weeks.   Patient is to return next week for continued care and treatment of her condition consistent with plan of care.  Treatment today is considered active treatment (AT).    Clinician time: I spent a total of 27 minutes on the day of the visit.  This includes chart review, face to face time, documenting, counseling and coordination of care, not including staff or therapy time completed during this visit.         2E

## 2025-01-17 ENCOUNTER — APPOINTMENT (OUTPATIENT)
Dept: OBGYN | Facility: CLINIC | Age: 40
End: 2025-01-17
Payer: COMMERCIAL

## 2025-01-17 PROCEDURE — 0502F SUBSEQUENT PRENATAL CARE: CPT

## 2025-01-17 PROCEDURE — 36415 COLL VENOUS BLD VENIPUNCTURE: CPT

## 2025-02-24 ENCOUNTER — APPOINTMENT (OUTPATIENT)
Dept: OBGYN | Facility: CLINIC | Age: 40
End: 2025-02-24
Payer: COMMERCIAL

## 2025-02-24 ENCOUNTER — APPOINTMENT (OUTPATIENT)
Dept: ANTEPARTUM | Facility: CLINIC | Age: 40
End: 2025-02-24
Payer: COMMERCIAL

## 2025-02-24 ENCOUNTER — ASOB RESULT (OUTPATIENT)
Age: 40
End: 2025-02-24

## 2025-02-24 PROCEDURE — 76811 OB US DETAILED SNGL FETUS: CPT

## 2025-02-24 PROCEDURE — 0502F SUBSEQUENT PRENATAL CARE: CPT

## 2025-03-18 ENCOUNTER — APPOINTMENT (OUTPATIENT)
Dept: OBGYN | Facility: CLINIC | Age: 40
End: 2025-03-18
Payer: COMMERCIAL

## 2025-03-18 PROCEDURE — 0502F SUBSEQUENT PRENATAL CARE: CPT

## 2025-03-20 ENCOUNTER — APPOINTMENT (OUTPATIENT)
Dept: PEDIATRIC CARDIOLOGY | Facility: CLINIC | Age: 40
End: 2025-03-20
Payer: COMMERCIAL

## 2025-03-20 PROCEDURE — 76820 UMBILICAL ARTERY ECHO: CPT | Mod: 26

## 2025-03-20 PROCEDURE — 99203 OFFICE O/P NEW LOW 30 MIN: CPT | Mod: 25

## 2025-03-20 PROCEDURE — 76825 ECHO EXAM OF FETAL HEART: CPT

## 2025-03-20 PROCEDURE — 76827 ECHO EXAM OF FETAL HEART: CPT

## 2025-03-20 PROCEDURE — 76821 MIDDLE CEREBRAL ARTERY ECHO: CPT | Mod: 26

## 2025-04-09 ENCOUNTER — APPOINTMENT (OUTPATIENT)
Dept: OBGYN | Facility: CLINIC | Age: 40
End: 2025-04-09
Payer: COMMERCIAL

## 2025-04-09 PROCEDURE — 0502F SUBSEQUENT PRENATAL CARE: CPT

## 2025-05-06 ENCOUNTER — APPOINTMENT (OUTPATIENT)
Dept: OBGYN | Facility: CLINIC | Age: 40
End: 2025-05-06
Payer: COMMERCIAL

## 2025-05-06 PROCEDURE — 36415 COLL VENOUS BLD VENIPUNCTURE: CPT

## 2025-05-06 PROCEDURE — 99213 OFFICE O/P EST LOW 20 MIN: CPT

## 2025-05-07 ENCOUNTER — APPOINTMENT (OUTPATIENT)
Dept: OBGYN | Facility: CLINIC | Age: 40
End: 2025-05-07
Payer: COMMERCIAL

## 2025-05-07 PROCEDURE — 0502F SUBSEQUENT PRENATAL CARE: CPT

## 2025-05-07 PROCEDURE — 76819 FETAL BIOPHYS PROFIL W/O NST: CPT | Mod: 59

## 2025-05-07 PROCEDURE — 90471 IMMUNIZATION ADMIN: CPT

## 2025-05-07 PROCEDURE — 90715 TDAP VACCINE 7 YRS/> IM: CPT

## 2025-05-07 PROCEDURE — 76816 OB US FOLLOW-UP PER FETUS: CPT

## 2025-05-12 ENCOUNTER — OUTPATIENT (OUTPATIENT)
Dept: OUTPATIENT SERVICES | Facility: HOSPITAL | Age: 40
LOS: 1 days | Discharge: HOME | End: 2025-05-12

## 2025-05-12 DIAGNOSIS — Z90.89 ACQUIRED ABSENCE OF OTHER ORGANS: Chronic | ICD-10-CM

## 2025-05-12 DIAGNOSIS — O99.119 OTHER DISEASES OF THE BLOOD AND BLOOD-FORMING ORGANS AND CERTAIN DISORDERS INVOLVING THE IMMUNE MECHANISM COMPLICATING PREGNANCY, UNSPECIFIED TRIMESTER: ICD-10-CM

## 2025-05-12 DIAGNOSIS — Z98.890 OTHER SPECIFIED POSTPROCEDURAL STATES: Chronic | ICD-10-CM

## 2025-05-12 DIAGNOSIS — C43.9 MALIGNANT MELANOMA OF SKIN, UNSPECIFIED: Chronic | ICD-10-CM

## 2025-05-13 ENCOUNTER — RESULT REVIEW (OUTPATIENT)
Age: 40
End: 2025-05-13

## 2025-05-13 ENCOUNTER — APPOINTMENT (OUTPATIENT)
Dept: HEMATOLOGY ONCOLOGY | Facility: CLINIC | Age: 40
End: 2025-05-13
Payer: COMMERCIAL

## 2025-05-13 VITALS
HEART RATE: 98 BPM | HEIGHT: 63.39 IN | RESPIRATION RATE: 16 BRPM | BODY MASS INDEX: 29.17 KG/M2 | SYSTOLIC BLOOD PRESSURE: 125 MMHG | TEMPERATURE: 98 F | OXYGEN SATURATION: 100 % | DIASTOLIC BLOOD PRESSURE: 80 MMHG | WEIGHT: 166.67 LBS

## 2025-05-13 DIAGNOSIS — D72.829 ELEVATED WHITE BLOOD CELL COUNT, UNSPECIFIED: ICD-10-CM

## 2025-05-13 LAB
BASOPHILS # BLD AUTO: 0.07 K/UL — SIGNIFICANT CHANGE UP (ref 0–0.2)
BASOPHILS NFR BLD AUTO: 0.4 % — SIGNIFICANT CHANGE UP (ref 0–2)
EOSINOPHIL # BLD AUTO: 0.17 K/UL — SIGNIFICANT CHANGE UP (ref 0–0.5)
EOSINOPHIL NFR BLD AUTO: 0.9 % — SIGNIFICANT CHANGE UP (ref 0–6)
HCT VFR BLD CALC: 35.8 % — SIGNIFICANT CHANGE UP (ref 34.5–45)
HGB BLD-MCNC: 12.7 G/DL — SIGNIFICANT CHANGE UP (ref 11.5–15.5)
IMM GRANULOCYTES NFR BLD AUTO: 1.6 % — HIGH (ref 0–0.9)
LYMPHOCYTES # BLD AUTO: 16.3 % — SIGNIFICANT CHANGE UP (ref 13–44)
LYMPHOCYTES # BLD AUTO: 2.94 K/UL — SIGNIFICANT CHANGE UP (ref 1–3.3)
MCHC RBC-ENTMCNC: 31 PG — SIGNIFICANT CHANGE UP (ref 27–34)
MCHC RBC-ENTMCNC: 35.5 G/DL — SIGNIFICANT CHANGE UP (ref 32–36)
MCV RBC AUTO: 87.3 FL — SIGNIFICANT CHANGE UP (ref 80–100)
MONOCYTES # BLD AUTO: 1.36 K/UL — HIGH (ref 0–0.9)
MONOCYTES NFR BLD AUTO: 7.5 % — SIGNIFICANT CHANGE UP (ref 2–14)
NEUTROPHILS # BLD AUTO: 13.2 K/UL — HIGH (ref 1.8–7.4)
NEUTROPHILS NFR BLD AUTO: 73.3 % — SIGNIFICANT CHANGE UP (ref 43–77)
NRBC BLD AUTO-RTO: 0 /100 WBCS — SIGNIFICANT CHANGE UP (ref 0–0)
PLATELET # BLD AUTO: 267 K/UL — SIGNIFICANT CHANGE UP (ref 150–400)
RBC # BLD: 4.1 M/UL — SIGNIFICANT CHANGE UP (ref 3.8–5.2)
RBC # FLD: 13.6 % — SIGNIFICANT CHANGE UP (ref 10.3–14.5)
WBC # BLD: 18.03 K/UL — HIGH (ref 3.8–10.5)
WBC # FLD AUTO: 18.03 K/UL — HIGH (ref 3.8–10.5)

## 2025-05-13 PROCEDURE — G2211 COMPLEX E/M VISIT ADD ON: CPT

## 2025-05-13 PROCEDURE — 99204 OFFICE O/P NEW MOD 45 MIN: CPT

## 2025-05-14 LAB
ALBUMIN SERPL ELPH-MCNC: 3.7 G/DL
ALP BLD-CCNC: 69 U/L
ALT SERPL-CCNC: 20 U/L
ANION GAP SERPL CALC-SCNC: 14 MMOL/L
AST SERPL-CCNC: 20 U/L
BILIRUB SERPL-MCNC: 0.2 MG/DL
BUN SERPL-MCNC: 11 MG/DL
CALCIUM SERPL-MCNC: 9.5 MG/DL
CHLORIDE SERPL-SCNC: 104 MMOL/L
CO2 SERPL-SCNC: 19 MMOL/L
CREAT SERPL-MCNC: 0.61 MG/DL
EGFRCR SERPLBLD CKD-EPI 2021: 116 ML/MIN/1.73M2
GLUCOSE SERPL-MCNC: 102 MG/DL
POTASSIUM SERPL-SCNC: 4 MMOL/L
PROT SERPL-MCNC: 5.6 G/DL
SODIUM SERPL-SCNC: 137 MMOL/L

## 2025-05-22 ENCOUNTER — APPOINTMENT (OUTPATIENT)
Dept: OBGYN | Facility: CLINIC | Age: 40
End: 2025-05-22
Payer: COMMERCIAL

## 2025-05-22 PROCEDURE — 0502F SUBSEQUENT PRENATAL CARE: CPT

## 2025-06-05 ENCOUNTER — APPOINTMENT (OUTPATIENT)
Dept: OBGYN | Facility: CLINIC | Age: 40
End: 2025-06-05
Payer: COMMERCIAL

## 2025-06-05 PROCEDURE — 0502F SUBSEQUENT PRENATAL CARE: CPT

## 2025-06-05 PROCEDURE — 76816 OB US FOLLOW-UP PER FETUS: CPT

## 2025-06-05 PROCEDURE — 76818 FETAL BIOPHYS PROFILE W/NST: CPT | Mod: 59

## 2025-06-12 ENCOUNTER — APPOINTMENT (OUTPATIENT)
Dept: OBGYN | Facility: CLINIC | Age: 40
End: 2025-06-12
Payer: COMMERCIAL

## 2025-06-12 PROCEDURE — 76818 FETAL BIOPHYS PROFILE W/NST: CPT | Mod: 59

## 2025-06-12 PROCEDURE — 0502F SUBSEQUENT PRENATAL CARE: CPT

## 2025-06-12 PROCEDURE — 76816 OB US FOLLOW-UP PER FETUS: CPT

## 2025-06-19 ENCOUNTER — APPOINTMENT (OUTPATIENT)
Dept: OBGYN | Facility: CLINIC | Age: 40
End: 2025-06-19
Payer: COMMERCIAL

## 2025-06-19 PROCEDURE — 76816 OB US FOLLOW-UP PER FETUS: CPT

## 2025-06-19 PROCEDURE — 59426 ANTEPARTUM CARE ONLY: CPT

## 2025-06-19 PROCEDURE — 76818 FETAL BIOPHYS PROFILE W/NST: CPT | Mod: 59

## 2025-06-19 PROCEDURE — 0502F SUBSEQUENT PRENATAL CARE: CPT

## 2025-06-24 ENCOUNTER — INPATIENT (INPATIENT)
Facility: HOSPITAL | Age: 40
LOS: 1 days | Discharge: HOME | DRG: 951 | End: 2025-06-26
Attending: OBSTETRICS & GYNECOLOGY | Admitting: OBSTETRICS & GYNECOLOGY
Payer: COMMERCIAL

## 2025-06-24 VITALS
DIASTOLIC BLOOD PRESSURE: 75 MMHG | HEART RATE: 94 BPM | TEMPERATURE: 98 F | SYSTOLIC BLOOD PRESSURE: 126 MMHG | OXYGEN SATURATION: 97 %

## 2025-06-24 DIAGNOSIS — Z90.89 ACQUIRED ABSENCE OF OTHER ORGANS: Chronic | ICD-10-CM

## 2025-06-24 DIAGNOSIS — C43.9 MALIGNANT MELANOMA OF SKIN, UNSPECIFIED: Chronic | ICD-10-CM

## 2025-06-24 DIAGNOSIS — Z98.890 OTHER SPECIFIED POSTPROCEDURAL STATES: Chronic | ICD-10-CM

## 2025-06-24 DIAGNOSIS — Z33.1 PREGNANT STATE, INCIDENTAL: ICD-10-CM

## 2025-06-24 LAB
BASOPHILS # BLD AUTO: 0.07 K/UL — SIGNIFICANT CHANGE UP (ref 0–0.2)
BASOPHILS # BLD MANUAL: 0 K/UL — SIGNIFICANT CHANGE UP (ref 0–0.2)
BASOPHILS NFR BLD AUTO: 0.4 % — SIGNIFICANT CHANGE UP (ref 0–2)
BASOPHILS NFR BLD MANUAL: 0 % — SIGNIFICANT CHANGE UP (ref 0–2)
BLD GP AB SCN SERPL QL: NEGATIVE — SIGNIFICANT CHANGE UP
EOSINOPHIL # BLD AUTO: 0.18 K/UL — SIGNIFICANT CHANGE UP (ref 0–0.5)
EOSINOPHIL # BLD MANUAL: 0 K/UL — SIGNIFICANT CHANGE UP (ref 0–0.5)
EOSINOPHIL NFR BLD AUTO: 1 % — SIGNIFICANT CHANGE UP (ref 0–6)
EOSINOPHIL NFR BLD MANUAL: 0 % — SIGNIFICANT CHANGE UP (ref 0–6)
HCT VFR BLD CALC: 38.5 % — SIGNIFICANT CHANGE UP (ref 34.5–45)
HGB BLD-MCNC: 13.3 G/DL — SIGNIFICANT CHANGE UP (ref 11.5–15.5)
IMM GRANULOCYTES # BLD AUTO: 0.23 K/UL — HIGH (ref 0–0.07)
IMM GRANULOCYTES NFR BLD AUTO: 1.2 % — HIGH (ref 0–0.9)
LYMPHOCYTES # BLD AUTO: 3.31 K/UL — HIGH (ref 1–3.3)
LYMPHOCYTES # BLD MANUAL: 4.38 K/UL — HIGH (ref 1–3.3)
LYMPHOCYTES NFR BLD AUTO: 17.6 % — SIGNIFICANT CHANGE UP (ref 13–44)
LYMPHOCYTES NFR BLD MANUAL: 23.3 % — SIGNIFICANT CHANGE UP (ref 13–44)
MANUAL MYELOCYTE #: 0.17 K/UL — HIGH (ref 0–0)
MCHC RBC-ENTMCNC: 30.5 PG — SIGNIFICANT CHANGE UP (ref 27–34)
MCHC RBC-ENTMCNC: 34.5 G/DL — SIGNIFICANT CHANGE UP (ref 32–36)
MCV RBC AUTO: 88.3 FL — SIGNIFICANT CHANGE UP (ref 80–100)
MONOCYTES # BLD AUTO: 1.57 K/UL — HIGH (ref 0–0.9)
MONOCYTES # BLD MANUAL: 0.49 K/UL — SIGNIFICANT CHANGE UP (ref 0–0.9)
MONOCYTES NFR BLD AUTO: 8.3 % — SIGNIFICANT CHANGE UP (ref 2–14)
MONOCYTES NFR BLD MANUAL: 2.6 % — SIGNIFICANT CHANGE UP (ref 2–14)
MYELOCYTES NFR BLD: 0.9 % — HIGH (ref 0–0)
NEUTROPHILS # BLD AUTO: 13.45 K/UL — HIGH (ref 1.8–7.4)
NEUTROPHILS # BLD MANUAL: 13.77 K/UL — HIGH (ref 1.8–7.4)
NEUTROPHILS NFR BLD AUTO: 71.5 % — SIGNIFICANT CHANGE UP (ref 43–77)
NEUTROPHILS NFR BLD MANUAL: 73.2 % — SIGNIFICANT CHANGE UP (ref 43–77)
NRBC # BLD AUTO: 0 K/UL — SIGNIFICANT CHANGE UP (ref 0–0)
NRBC # FLD: 0 K/UL — SIGNIFICANT CHANGE UP (ref 0–0)
NRBC BLD AUTO-RTO: 0 /100 WBCS — SIGNIFICANT CHANGE UP (ref 0–0)
PLAT MORPH BLD: NORMAL — SIGNIFICANT CHANGE UP
PLATELET # BLD AUTO: 300 K/UL — SIGNIFICANT CHANGE UP (ref 150–400)
PMV BLD: 10.1 FL — SIGNIFICANT CHANGE UP (ref 7–13)
RBC # BLD: 4.36 M/UL — SIGNIFICANT CHANGE UP (ref 3.8–5.2)
RBC # FLD: 13.5 % — SIGNIFICANT CHANGE UP (ref 10.3–14.5)
RBC BLD AUTO: NORMAL — SIGNIFICANT CHANGE UP
RH IG SCN BLD-IMP: POSITIVE — SIGNIFICANT CHANGE UP
WBC # BLD: 18.81 K/UL — HIGH (ref 3.8–10.5)
WBC # FLD AUTO: 18.81 K/UL — HIGH (ref 3.8–10.5)

## 2025-06-24 RX ORDER — SODIUM CHLORIDE 9 G/1000ML
1000 INJECTION, SOLUTION INTRAVENOUS
Refills: 0 | Status: DISCONTINUED | OUTPATIENT
Start: 2025-06-24 | End: 2025-06-25

## 2025-06-24 RX ORDER — OXYTOCIN-SODIUM CHLORIDE 0.9% IV SOLN 30 UNIT/500ML 30-0.9/5 UT/ML-%
167 SOLUTION INTRAVENOUS
Qty: 30 | Refills: 0 | Status: DISCONTINUED | OUTPATIENT
Start: 2025-06-24 | End: 2025-06-25

## 2025-06-24 RX ORDER — CITRIC ACID/SODIUM CITRATE 300-500 MG
15 SOLUTION, ORAL ORAL EVERY 6 HOURS
Refills: 0 | Status: DISCONTINUED | OUTPATIENT
Start: 2025-06-24 | End: 2025-06-25

## 2025-06-24 RX ADMIN — SODIUM CHLORIDE 125 MILLILITER(S): 9 INJECTION, SOLUTION INTRAVENOUS at 23:40

## 2025-06-24 RX ADMIN — SODIUM CHLORIDE 125 MILLILITER(S): 9 INJECTION, SOLUTION INTRAVENOUS at 23:00

## 2025-06-24 NOTE — OB PROVIDER H&P - NSLOWPPHRISK_OBGYN_A_OB
No previous uterine incision/Cade Pregnancy/Less than or equal to 4 previous vaginal births/No known bleeding disorder/No history of postpartum hemorrhage

## 2025-06-24 NOTE — OB PROVIDER H&P - ASSESSMENT
A/P: 40y  at 38.6 weeks GA presents to L&D for IOL for AMA.  -Admit to L&D  -Consent signed  -Admission labs  -CLD  -IV fluids  -Labor: Intact. Latent labor. Bony q3-6 min. Induce labor with buccal cytotec and cervical balloon at MN, then pitocin at 4a.  -Fetus: Cat I tracing. Continuous toco and fetal monitoring.   -GBS: Negative, no GBS ppx required   -Analgesia: epi PRN  -DVT ppx: Ambulate and SCD's while in bed     Discussed with Dr. Hawk, attending    Adina Hung, PGY1

## 2025-06-24 NOTE — OB PROVIDER H&P - NSICDXPASTSURGICALHX_GEN_ALL_CORE_FT
DISPLAY PLAN FREE TEXT PAST SURGICAL HISTORY:  History of augmentation of both breasts implants placed 11 years ago----removed 6/2019    History of tonsillectomy     Melanoma of skin 13 years ago    S/P dilatation and curettage

## 2025-06-24 NOTE — OB PROVIDER H&P - HISTORY OF PRESENT ILLNESS
40y  at 38.6 weeks GA presents to L&D for IOL for AMA. Patient denies vaginal bleeding, contractions and leakage of fluid. She endorses good fetal movement. Denies fevers, chills, nausea and vomiting. No other complaints at this time. Prenatal course is significant for: leukocytosis in pregnancy, seen by hematology, noted to be inflammation superimposed on normal pregnancy state. No interventions.    [place]: 2025    POB:  FT  7#3   TOP x1 s/p D&E (T18)   SAB x1, no D&C  PGYN: hx abnormal pap with subsequent normal paps, hx oral HSV denies ever having genital lesions; denies fibroids, ovarian cysts, STD hx   PMH: hx melanoma s/p excision (20+years ago), illness-induced asthma (no hospitalizations/intubations, no inhaler use this pregnancy), psoriasis  PSH: breast augmentation and removal, D&C x1 ( for adhesions), D&E x1 (), melanoma excision, tonsillectomy  SH: Denies EtOH, tobacco and illicit drug use during this pregnancy; feels safe at home   Psych Hx: denies hx of anxiety or depression  Meds: PNVs, bASA  Allergies: NKDA    EFW: 3500g    GBS: negative

## 2025-06-24 NOTE — OB PROVIDER IHI INDUCTION/AUGMENTATION NOTE - NS_OBIHIINDOTHER_OBGYN_ALL_OB_FT
Called Pt and reminded them of their appt on 12/13/24 and let them know they have labs due prior to their appt  
AMA

## 2025-06-24 NOTE — OB PROVIDER H&P - NSHPPHYSICALEXAM_GEN_ALL_CORE
T(C): 36.8 (06-24-25 @ 22:08), Max: 36.8 (06-24-25 @ 21:49)  HR: 92 (06-24-25 @ 22:08) (87 - 95)  BP: 126/75 (06-24-25 @ 22:08) (126/75 - 126/75)  RR: 18 (06-24-25 @ 22:08) (18 - 18)  SpO2: 97% (06-24-25 @ 22:08) (96% - 98%)  Gen: NAD, well-appearing   Abd: Soft, gravid  SVE: 2.5/50/-3  Bedside sono: vertex  FHT: baseline 140 bpm, moderate variability, +Accels, -decels  [place]: q3-6 min T(C): 36.8 (06-24-25 @ 22:08), Max: 36.8 (06-24-25 @ 21:49)  HR: 92 (06-24-25 @ 22:08) (87 - 95)  BP: 126/75 (06-24-25 @ 22:08) (126/75 - 126/75)  RR: 18 (06-24-25 @ 22:08) (18 - 18)  SpO2: 97% (06-24-25 @ 22:08) (96% - 98%)  Gen: NAD, well-appearing   Abd: Soft, gravid  SVE: 2.5/50/-3  Sterile speculum exam: negative for genital lesions  Bedside sono: vertex  FHT: baseline 140 bpm, moderate variability, +Accels, -decels  [place]: q3-6 min

## 2025-06-25 LAB — T PALLIDUM AB TITR SER: NEGATIVE — SIGNIFICANT CHANGE UP

## 2025-06-25 PROCEDURE — 85025 COMPLETE CBC W/AUTO DIFF WBC: CPT

## 2025-06-25 PROCEDURE — 86901 BLOOD TYPING SEROLOGIC RH(D): CPT

## 2025-06-25 PROCEDURE — 86850 RBC ANTIBODY SCREEN: CPT

## 2025-06-25 PROCEDURE — 59410 OBSTETRICAL CARE: CPT

## 2025-06-25 PROCEDURE — 59050 FETAL MONITOR W/REPORT: CPT

## 2025-06-25 PROCEDURE — 86780 TREPONEMA PALLIDUM: CPT

## 2025-06-25 PROCEDURE — 86900 BLOOD TYPING SEROLOGIC ABO: CPT

## 2025-06-25 RX ORDER — MAGNESIUM HYDROXIDE 400 MG/5ML
30 SUSPENSION ORAL
Refills: 0 | Status: DISCONTINUED | OUTPATIENT
Start: 2025-06-25 | End: 2025-06-26

## 2025-06-25 RX ORDER — PRENATAL 136/IRON/FOLIC ACID 27 MG-1 MG
1 TABLET ORAL DAILY
Refills: 0 | Status: DISCONTINUED | OUTPATIENT
Start: 2025-06-25 | End: 2025-06-26

## 2025-06-25 RX ORDER — IBUPROFEN 200 MG
600 TABLET ORAL EVERY 6 HOURS
Refills: 0 | Status: COMPLETED | OUTPATIENT
Start: 2025-06-25 | End: 2026-05-24

## 2025-06-25 RX ORDER — KETOROLAC TROMETHAMINE 30 MG/ML
30 INJECTION, SOLUTION INTRAMUSCULAR; INTRAVENOUS ONCE
Refills: 0 | Status: DISCONTINUED | OUTPATIENT
Start: 2025-06-25 | End: 2025-06-25

## 2025-06-25 RX ORDER — IBUPROFEN 200 MG
600 TABLET ORAL EVERY 6 HOURS
Refills: 0 | Status: DISCONTINUED | OUTPATIENT
Start: 2025-06-25 | End: 2025-06-26

## 2025-06-25 RX ORDER — DIBUCAINE 10 MG/G
1 OINTMENT TOPICAL EVERY 6 HOURS
Refills: 0 | Status: DISCONTINUED | OUTPATIENT
Start: 2025-06-25 | End: 2025-06-26

## 2025-06-25 RX ORDER — ACETAMINOPHEN 500 MG/5ML
975 LIQUID (ML) ORAL
Refills: 0 | Status: DISCONTINUED | OUTPATIENT
Start: 2025-06-25 | End: 2025-06-26

## 2025-06-25 RX ORDER — DIPHENHYDRAMINE HCL 12.5MG/5ML
25 ELIXIR ORAL EVERY 6 HOURS
Refills: 0 | Status: DISCONTINUED | OUTPATIENT
Start: 2025-06-25 | End: 2025-06-26

## 2025-06-25 RX ORDER — HYDROCORTISONE 10 MG/G
1 CREAM TOPICAL EVERY 6 HOURS
Refills: 0 | Status: DISCONTINUED | OUTPATIENT
Start: 2025-06-25 | End: 2025-06-26

## 2025-06-25 RX ORDER — DIPHENHYDRAMINE HCL 12.5MG/5ML
25 ELIXIR ORAL ONCE
Refills: 0 | Status: COMPLETED | OUTPATIENT
Start: 2025-06-25 | End: 2025-06-25

## 2025-06-25 RX ORDER — MODIFIED LANOLIN 100 %
1 CREAM (GRAM) TOPICAL EVERY 6 HOURS
Refills: 0 | Status: DISCONTINUED | OUTPATIENT
Start: 2025-06-25 | End: 2025-06-26

## 2025-06-25 RX ORDER — WITCH HAZEL LEAF
1 FLUID EXTRACT MISCELLANEOUS EVERY 4 HOURS
Refills: 0 | Status: DISCONTINUED | OUTPATIENT
Start: 2025-06-25 | End: 2025-06-26

## 2025-06-25 RX ORDER — BENZOCAINE 220 MG/G
1 SPRAY, METERED PERIODONTAL EVERY 6 HOURS
Refills: 0 | Status: DISCONTINUED | OUTPATIENT
Start: 2025-06-25 | End: 2025-06-26

## 2025-06-25 RX ORDER — PRAMOXINE HCL 1 %
1 GEL (GRAM) TOPICAL EVERY 4 HOURS
Refills: 0 | Status: DISCONTINUED | OUTPATIENT
Start: 2025-06-25 | End: 2025-06-26

## 2025-06-25 RX ORDER — SIMETHICONE 80 MG
80 TABLET,CHEWABLE ORAL EVERY 4 HOURS
Refills: 0 | Status: DISCONTINUED | OUTPATIENT
Start: 2025-06-25 | End: 2025-06-26

## 2025-06-25 RX ORDER — OXYTOCIN-SODIUM CHLORIDE 0.9% IV SOLN 30 UNIT/500ML 30-0.9/5 UT/ML-%
167 SOLUTION INTRAVENOUS
Qty: 30 | Refills: 0 | Status: DISCONTINUED | OUTPATIENT
Start: 2025-06-25 | End: 2025-06-26

## 2025-06-25 RX ORDER — OXYTOCIN-SODIUM CHLORIDE 0.9% IV SOLN 30 UNIT/500ML 30-0.9/5 UT/ML-%
4 SOLUTION INTRAVENOUS
Qty: 30 | Refills: 0 | Status: DISCONTINUED | OUTPATIENT
Start: 2025-06-25 | End: 2025-06-26

## 2025-06-25 RX ADMIN — KETOROLAC TROMETHAMINE 30 MILLIGRAM(S): 30 INJECTION, SOLUTION INTRAMUSCULAR; INTRAVENOUS at 09:47

## 2025-06-25 RX ADMIN — Medication 1 TABLET(S): at 12:20

## 2025-06-25 RX ADMIN — Medication 975 MILLIGRAM(S): at 12:24

## 2025-06-25 RX ADMIN — Medication 600 MILLIGRAM(S): at 16:39

## 2025-06-25 RX ADMIN — Medication 25 MILLIGRAM(S): at 04:14

## 2025-06-25 RX ADMIN — Medication 975 MILLIGRAM(S): at 12:58

## 2025-06-25 RX ADMIN — Medication 1 APPLICATION(S): at 01:11

## 2025-06-25 RX ADMIN — Medication 600 MILLIGRAM(S): at 15:51

## 2025-06-25 RX ADMIN — Medication 975 MILLIGRAM(S): at 17:25

## 2025-06-25 RX ADMIN — OXYTOCIN-SODIUM CHLORIDE 0.9% IV SOLN 30 UNIT/500ML 4 MILLIUNIT(S)/MIN: 30-0.9/5 SOLUTION at 04:03

## 2025-06-25 RX ADMIN — Medication 600 MILLIGRAM(S): at 21:00

## 2025-06-25 NOTE — OB PROVIDER LABOR PROGRESS NOTE - ASSESSMENT
Pt is a 39yo  @39w admitted for IOL for AMA.    - Cervical balloon placed at 1a, 80cc instilled in uterine balloon, 40cc instilled in vaginal balloon; patient tolerated, no complications  - Continue cont EFM, toco, IVF  - Epidural in place, patient comfortable  - S/p buccal cytotec x1  - To start pitocin at 4a    Plan per Dr. Serrano, attending  Adina Hung MD PGY1
attg note  cx 5/80/-2  fhr cat 1  arm clear  pit at 8  epid in place  joanna otero md

## 2025-06-25 NOTE — OB PROVIDER LABOR PROGRESS NOTE - NS_SUBJECTIVE/OBJECTIVE_OBGYN_ALL_OB_FT
S:  Pt seen and examined for cervical balloon placement.    O:  Vital Signs Last 24 Hrs  T(C): 36.8 (25 Jun 2025 00:23), Max: 36.8 (24 Jun 2025 21:49)  T(F): 98.2 (24 Jun 2025 22:08), Max: 98.24 (24 Jun 2025 21:49)  HR: 104 (25 Jun 2025 01:01) (76 - 128)  BP: 116/67 (25 Jun 2025 01:00) (102/66 - 135/81)  BP(mean): --  RR: 18 (25 Jun 2025 00:23) (18 - 18)  SpO2: 97% (25 Jun 2025 01:01) (96% - 98%)    Parameters below as of 24 Jun 2025 22:08  Patient On (Oxygen Delivery Method): room air

## 2025-06-25 NOTE — OB PROVIDER DELIVERY SUMMARY - NSLOWPPHRISK_OBGYN_A_OB
No previous uterine incision/Cade Pregnancy/Less than or equal to 4 previous vaginal births/No known bleeding disorder/No history of postpartum hemorrhage/No other PPH risks indicated

## 2025-06-25 NOTE — OB PROVIDER DELIVERY SUMMARY - AMNIOTIC FLUID COLOR, LABOR
Pt is in sodium on admission is 130   Recovered to 137 w/in 24 hrs   Resolved     Plan  · Was likely due to decreased oral intake  · Continue NS @ 75 cc/hr for now  · Continue to monitor  · Trend BMP  · Do NOT over-correct (slow correction rate to < 6-8 / 24 hrs) clear

## 2025-06-25 NOTE — OB PROVIDER DELIVERY SUMMARY - NSPROVIDERDELIVERYNOTE_OBGYN_ALL_OB_FT
[facility]ERSON NL FEMALE INFANT---DELAYED CORD CLAMPING X 60 SECS  APGARS 9/9  PLACENTA SPONT INTACT---UTERUS EXPLORED AND EMPTY  2ND DEG LAC REPAIRED   CC'S  PT TO RR IN GOOD CONDITION  ROBERTO DUONG MD

## 2025-06-25 NOTE — PRE-ANESTHESIA EVALUATION ADULT - NSANTHOBSERVEDRD_ENT_A_CORE
This note was copied from the mother's chart.  LC rounds, pt reports infant just completed feeding, was more alert after deep suctioning. Pt states latch was deep and denies pain at latch. Support and encouragement offered. Pt desires d/c today; Lactation discharge education completed. Plan of care is for pt to follow basic breastfeeding education, frequent feeding on demand, and to monitor baby's voids and stools. Encouraged pt to hand express after every feeding today and to begin pumping at any time infant does not latch. Reviewed  intake requirements and monitoring voids/stools. Breastfeeding guide, including First Alert survey, resource list, and lactation warmline phone number reviewed. Pt to notify doctor for maternal or infant concerns, as reviewed with LC. Pt has OPC scheduled 12/10 at 1400 at [facility]. Pt verbalizes understanding and questions answered.      No

## 2025-06-25 NOTE — OB RN DELIVERY SUMMARY - NS_SEPSISRSKCALC_OBGYN_ALL_OB_FT
EOS calculated successfully. EOS Risk Factor: 0.5/1000 live births ([facility] national incidence); GA=39w;Temp=98.42; ROM=2.417; GBS='Negative'; Antibiotics='No antibiotics or any antibiotics < 2 hrs prior to birth'

## 2025-06-25 NOTE — OB PROVIDER DELIVERY SUMMARY - NSSELHIDDEN_OBGYN_ALL_OB_FT
[NS_DeliveryAttending1_OBGYN_ALL_OB_FT:[id number]],[NS_DeliveryRN_OBGYN_ALL_OB_FT:[id number]=]

## 2025-06-25 NOTE — OB RN DELIVERY SUMMARY - NSSELHIDDEN_OBGYN_ALL_OB_FT
[NS_DeliveryAttending1_OBGYN_ALL_OB_FT:[id number]],[NS_DeliveryRN_OBGYN_ALL_OB_FT:[id number]=]

## 2025-06-26 ENCOUNTER — TRANSCRIPTION ENCOUNTER (OUTPATIENT)
Age: 40
End: 2025-06-26

## 2025-06-26 VITALS
OXYGEN SATURATION: 98 % | SYSTOLIC BLOOD PRESSURE: 97 MMHG | DIASTOLIC BLOOD PRESSURE: 60 MMHG | HEART RATE: 78 BPM | TEMPERATURE: 98 F | RESPIRATION RATE: 18 BRPM

## 2025-06-26 PROCEDURE — 86901 BLOOD TYPING SEROLOGIC RH(D): CPT

## 2025-06-26 PROCEDURE — 59050 FETAL MONITOR W/REPORT: CPT

## 2025-06-26 PROCEDURE — 86850 RBC ANTIBODY SCREEN: CPT

## 2025-06-26 PROCEDURE — 85025 COMPLETE CBC W/AUTO DIFF WBC: CPT

## 2025-06-26 PROCEDURE — 86780 TREPONEMA PALLIDUM: CPT

## 2025-06-26 PROCEDURE — 86900 BLOOD TYPING SEROLOGIC ABO: CPT

## 2025-06-26 RX ADMIN — Medication 975 MILLIGRAM(S): at 12:12

## 2025-06-26 RX ADMIN — Medication 975 MILLIGRAM(S): at 05:42

## 2025-06-26 RX ADMIN — Medication 600 MILLIGRAM(S): at 02:46

## 2025-06-26 RX ADMIN — Medication 600 MILLIGRAM(S): at 09:56

## 2025-06-26 RX ADMIN — Medication 975 MILLIGRAM(S): at 00:40

## 2025-06-26 RX ADMIN — Medication 600 MILLIGRAM(S): at 08:56

## 2025-06-26 RX ADMIN — Medication 975 MILLIGRAM(S): at 11:12

## 2025-06-26 NOTE — DISCHARGE NOTE OB - MATERIALS PROVIDED
Vaccinations/[facility]  Screening Program/  Immunization Record/Bottle Feeding Log/Guide to Postpartum Care/[facility] Hearing Screen Program/Back To Sleep Handout/Shaken Baby Prevention Handout/Birth Certificate Instructions

## 2025-06-26 NOTE — DISCHARGE NOTE OB - PATIENT PORTAL LINK FT
You can access the FollowMyHealth Patient Portal offered by [facility] by registering at the following website: [email or web address]. By joining [facility]’s FollowMyHealth portal, you will also be able to view your health information using other applications (apps) compatible with our system.

## 2025-06-26 NOTE — PROGRESS NOTE ADULT - ASSESSMENT
A/P: 41yo PPD#1 s/p .  Patient is stable post-partum.   - Pain well controlled, continue current pain regimen with Motrin, Tylenol, and Oxycodone prn  - Increase ambulation, SCDs when not ambulating  - Continue regular diet    Padmini Cui, PGY-2

## 2025-06-26 NOTE — DISCHARGE NOTE OB - MEDICATION SUMMARY - MEDICATIONS TO TAKE
I will START or STAY ON the medications listed below when I get home from the hospital:    Motrin 600 mg oral tablet  -- 1 tab(s) by mouth every 6 hours  -- Indication: For Pain    Tylenol 500 mg oral tablet  -- 2 tab(s) by mouth every 6 hours  -- Indication: For Pain    Ventolin HFA 90 mcg/inh inhalation aerosol  -- 2 puff(s) inhaled every 6 hours, As Needed  -- Indication: For asthma    Symbicort 160 mcg-4.5 mcg/inh inhalation aerosol  -- 2 puff(s) inhaled 2 times a day  -- Indication: For asthma

## 2025-06-26 NOTE — PROGRESS NOTE ADULT - SUBJECTIVE AND OBJECTIVE BOX
OB Progress Note:  PPD#1    S: 41yo  PPD#1 s/p . Patient feels well. Pain is well controlled. She is tolerating a regular diet. She is voiding spontaneously, and ambulating without difficulty. Denies CP/SOB. Denies lightheadedness/dizziness. Denies N/V.    O:  Vitals:  Vital Signs Last 24 Hrs  T(C): 36.4 (2025 05:51), Max: 37 (2025 08:19)  T(F): 97.5 (2025 05:51), Max: 98.6 (2025 08:19)  HR: 78 (2025 05:51) (74 - 111)  BP: 97/60 (2025 05:51) (86/64 - 124/68)  BP(mean): --  RR: 18 (2025 05:51) (16 - 18)  SpO2: 98% (2025 05:51) (94% - 98%)    Parameters below as of 2025 05:51  Patient On (Oxygen Delivery Method): room air        MEDICATIONS  (STANDING):  acetaminophen     Tablet .. 975 milliGRAM(s) Oral <User Schedule>  diphtheria/tetanus/pertussis (acellular) Vaccine (Adacel) 0.5 milliLiter(s) IntraMuscular once  ibuprofen  Tablet. 600 milliGRAM(s) Oral every 6 hours  oxytocin Infusion 167 milliUNIT(s)/Min (167 mL/Hr) IV Continuous <Continuous>  oxytocin Infusion. 4 milliUNIT(s)/Min (4 mL/Hr) IV Continuous <Continuous>  prenatal multivitamin 1 Tablet(s) Oral daily  sodium chloride 0.9% lock flush 3 milliLiter(s) IV Push every 8 hours      Labs:  Blood type: AB Positive  Rubella IgG: RPR: Negative                          13.3   18.81[H] >-----------< 300    (  @ 22:50 )             38.5                  Physical Exam:  General: NAD  Abdomen: soft, non-tender, non-distended, fundus firm  Vaginal: Lochia wnl  Extremities: Non-tender calves bilaterally     OB Progress Note:  PPD#1    S: 39yo  PPD#1 s/p . Patient feels well. Pain is well controlled. She is tolerating a regular diet. She is voiding spontaneously, and ambulating without difficulty. Denies CP/SOB. Denies lightheadedness/dizziness. Denies N/V. Patient notes hand swelling this AM (ongoing since pregnancy)     O:  Vitals:  Vital Signs Last 24 Hrs  T(C): 36.4 (2025 05:51), Max: 37 (2025 08:19)  T(F): 97.5 (2025 05:51), Max: 98.6 (2025 08:19)  HR: 78 (2025 05:51) (74 - 111)  BP: 97/60 (2025 05:51) (86/64 - 124/68)  BP(mean): --  RR: 18 (2025 05:51) (16 - 18)  SpO2: 98% (2025 05:51) (94% - 98%)    Parameters below as of 2025 05:51  Patient On (Oxygen Delivery Method): room air        MEDICATIONS  (STANDING):  acetaminophen     Tablet .. 975 milliGRAM(s) Oral <User Schedule>  diphtheria/tetanus/pertussis (acellular) Vaccine (Adacel) 0.5 milliLiter(s) IntraMuscular once  ibuprofen  Tablet. 600 milliGRAM(s) Oral every 6 hours  oxytocin Infusion 167 milliUNIT(s)/Min (167 mL/Hr) IV Continuous <Continuous>  oxytocin Infusion. 4 milliUNIT(s)/Min (4 mL/Hr) IV Continuous <Continuous>  prenatal multivitamin 1 Tablet(s) Oral daily  sodium chloride 0.9% lock flush 3 milliLiter(s) IV Push every 8 hours      Labs:  Blood type: AB Positive  Rubella IgG: RPR: Negative                          13.3   18.81[H] >-----------< 300    (  @ 22:50 )             38.5                  Physical Exam:  General: NAD  Abdomen: soft, non-tender, non-distended, fundus firm  Vaginal: Lochia wnl  Extremities: Non-tender calves bilaterally

## 2025-06-26 NOTE — DISCHARGE NOTE OB - CARE PROVIDER_API CALL
Tyrell Serrano  Obstetrics & Gynecology  [address] [place] [address], [address]  [place] [address]  Phone: [phone]  Fax: [phone]  Follow Up Time:

## 2025-06-26 NOTE — DISCHARGE NOTE OB - FINANCIAL ASSISTANCE
[facility] provides services at a reduced cost to those who are determined to be eligible through [facility]’s financial assistance program. Information regarding [facility]’s financial assistance program can be found by going to [email or web address] or by calling [phone].

## 2025-06-26 NOTE — DISCHARGE NOTE OB - NS MD DC FALL RISK RISK
For information on Fall & Injury Prevention, visit: [email or web address] OR  [email or web address] OR  https://www.cdc.gov/steadi/patient.html

## 2025-06-29 ENCOUNTER — EMERGENCY (EMERGENCY)
Facility: HOSPITAL | Age: 40
LOS: 1 days | End: 2025-06-29
Attending: EMERGENCY MEDICINE
Payer: COMMERCIAL

## 2025-06-29 VITALS
TEMPERATURE: 98 F | HEIGHT: 64 IN | SYSTOLIC BLOOD PRESSURE: 136 MMHG | OXYGEN SATURATION: 99 % | HEART RATE: 85 BPM | WEIGHT: 169.98 LBS | RESPIRATION RATE: 18 BRPM | DIASTOLIC BLOOD PRESSURE: 84 MMHG

## 2025-06-29 DIAGNOSIS — Z98.890 OTHER SPECIFIED POSTPROCEDURAL STATES: Chronic | ICD-10-CM

## 2025-06-29 DIAGNOSIS — C43.9 MALIGNANT MELANOMA OF SKIN, UNSPECIFIED: Chronic | ICD-10-CM

## 2025-06-29 DIAGNOSIS — Z90.89 ACQUIRED ABSENCE OF OTHER ORGANS: Chronic | ICD-10-CM

## 2025-06-29 LAB
ADD ON TEST-SPECIMEN IN LAB: SIGNIFICANT CHANGE UP
ALBUMIN SERPL ELPH-MCNC: 4 G/DL — SIGNIFICANT CHANGE UP (ref 3.3–5)
ALP SERPL-CCNC: 100 U/L — SIGNIFICANT CHANGE UP (ref 40–120)
ALT FLD-CCNC: 43 U/L — SIGNIFICANT CHANGE UP (ref 10–45)
ANION GAP SERPL CALC-SCNC: 14 MMOL/L — SIGNIFICANT CHANGE UP (ref 5–17)
APPEARANCE UR: CLEAR — SIGNIFICANT CHANGE UP
APTT BLD: 25.8 SEC — LOW (ref 26.1–36.8)
AST SERPL-CCNC: 30 U/L — SIGNIFICANT CHANGE UP (ref 10–40)
BACTERIA # UR AUTO: NEGATIVE /HPF — SIGNIFICANT CHANGE UP
BASOPHILS # BLD AUTO: 0.08 K/UL — SIGNIFICANT CHANGE UP (ref 0–0.2)
BASOPHILS NFR BLD AUTO: 0.5 % — SIGNIFICANT CHANGE UP (ref 0–2)
BILIRUB SERPL-MCNC: 0.2 MG/DL — SIGNIFICANT CHANGE UP (ref 0.2–1.2)
BILIRUB UR-MCNC: NEGATIVE — SIGNIFICANT CHANGE UP
BLD GP AB SCN SERPL QL: NEGATIVE — SIGNIFICANT CHANGE UP
BUN SERPL-MCNC: 22 MG/DL — SIGNIFICANT CHANGE UP (ref 7–23)
CALCIUM SERPL-MCNC: 9.6 MG/DL — SIGNIFICANT CHANGE UP (ref 8.4–10.5)
CAST: 0 /LPF — SIGNIFICANT CHANGE UP (ref 0–4)
CHLORIDE SERPL-SCNC: 102 MMOL/L — SIGNIFICANT CHANGE UP (ref 96–108)
CO2 SERPL-SCNC: 23 MMOL/L — SIGNIFICANT CHANGE UP (ref 22–31)
COLOR SPEC: YELLOW — SIGNIFICANT CHANGE UP
CREAT SERPL-MCNC: 0.85 MG/DL — SIGNIFICANT CHANGE UP (ref 0.5–1.3)
DIFF PNL FLD: ABNORMAL
EGFR: 89 ML/MIN/1.73M2 — SIGNIFICANT CHANGE UP
EGFR: 89 ML/MIN/1.73M2 — SIGNIFICANT CHANGE UP
EOSINOPHIL # BLD AUTO: 0.16 K/UL — SIGNIFICANT CHANGE UP (ref 0–0.5)
EOSINOPHIL NFR BLD AUTO: 1 % — SIGNIFICANT CHANGE UP (ref 0–6)
GLUCOSE SERPL-MCNC: 88 MG/DL — SIGNIFICANT CHANGE UP (ref 70–99)
GLUCOSE UR QL: NEGATIVE MG/DL — SIGNIFICANT CHANGE UP
HCG SERPL-ACNC: 1061 MIU/ML — HIGH
HCT VFR BLD CALC: 35.1 % — SIGNIFICANT CHANGE UP (ref 34.5–45)
HGB BLD-MCNC: 11.9 G/DL — SIGNIFICANT CHANGE UP (ref 11.5–15.5)
IMM GRANULOCYTES # BLD AUTO: 0.16 K/UL — HIGH (ref 0–0.07)
IMM GRANULOCYTES NFR BLD AUTO: 1 % — HIGH (ref 0–0.9)
INR BLD: 0.83 RATIO — LOW (ref 0.85–1.16)
KETONES UR QL: NEGATIVE MG/DL — SIGNIFICANT CHANGE UP
LEUKOCYTE ESTERASE UR-ACNC: ABNORMAL
LYMPHOCYTES # BLD AUTO: 2.37 K/UL — SIGNIFICANT CHANGE UP (ref 1–3.3)
LYMPHOCYTES NFR BLD AUTO: 14.7 % — SIGNIFICANT CHANGE UP (ref 13–44)
MCHC RBC-ENTMCNC: 30.6 PG — SIGNIFICANT CHANGE UP (ref 27–34)
MCHC RBC-ENTMCNC: 33.9 G/DL — SIGNIFICANT CHANGE UP (ref 32–36)
MCV RBC AUTO: 90.2 FL — SIGNIFICANT CHANGE UP (ref 80–100)
MONOCYTES # BLD AUTO: 0.98 K/UL — HIGH (ref 0–0.9)
MONOCYTES NFR BLD AUTO: 6.1 % — SIGNIFICANT CHANGE UP (ref 2–14)
NEUTROPHILS # BLD AUTO: 12.32 K/UL — HIGH (ref 1.8–7.4)
NEUTROPHILS NFR BLD AUTO: 76.7 % — SIGNIFICANT CHANGE UP (ref 43–77)
NITRITE UR-MCNC: NEGATIVE — SIGNIFICANT CHANGE UP
NRBC # BLD AUTO: 0 K/UL — SIGNIFICANT CHANGE UP (ref 0–0)
NRBC # FLD: 0 K/UL — SIGNIFICANT CHANGE UP (ref 0–0)
NRBC BLD AUTO-RTO: 0 /100 WBCS — SIGNIFICANT CHANGE UP (ref 0–0)
PH UR: 7 — SIGNIFICANT CHANGE UP (ref 5–8)
PLATELET # BLD AUTO: 316 K/UL — SIGNIFICANT CHANGE UP (ref 150–400)
PMV BLD: 9.1 FL — SIGNIFICANT CHANGE UP (ref 7–13)
POTASSIUM SERPL-MCNC: 4.2 MMOL/L — SIGNIFICANT CHANGE UP (ref 3.5–5.3)
POTASSIUM SERPL-SCNC: 4.2 MMOL/L — SIGNIFICANT CHANGE UP (ref 3.5–5.3)
PROT SERPL-MCNC: 6.4 G/DL — SIGNIFICANT CHANGE UP (ref 6–8.3)
PROT UR-MCNC: SIGNIFICANT CHANGE UP MG/DL
PROTHROM AB SERPL-ACNC: 9.6 SEC — LOW (ref 9.9–13.4)
RBC # BLD: 3.89 M/UL — SIGNIFICANT CHANGE UP (ref 3.8–5.2)
RBC # FLD: 13.3 % — SIGNIFICANT CHANGE UP (ref 10.3–14.5)
RBC CASTS # UR COMP ASSIST: 737 /HPF — HIGH (ref 0–4)
RH IG SCN BLD-IMP: POSITIVE — SIGNIFICANT CHANGE UP
SODIUM SERPL-SCNC: 139 MMOL/L — SIGNIFICANT CHANGE UP (ref 135–145)
SP GR SPEC: 1.01 — SIGNIFICANT CHANGE UP (ref 1–1.03)
SQUAMOUS # UR AUTO: 1 /HPF — SIGNIFICANT CHANGE UP (ref 0–5)
UROBILINOGEN FLD QL: 0.2 MG/DL — SIGNIFICANT CHANGE UP (ref 0.2–1)
WBC # BLD: 16.07 K/UL — HIGH (ref 3.8–10.5)
WBC # FLD AUTO: 16.07 K/UL — HIGH (ref 3.8–10.5)
WBC UR QL: 4 /HPF — SIGNIFICANT CHANGE UP (ref 0–5)

## 2025-06-29 PROCEDURE — 85025 COMPLETE CBC W/AUTO DIFF WBC: CPT

## 2025-06-29 PROCEDURE — 83880 ASSAY OF NATRIURETIC PEPTIDE: CPT

## 2025-06-29 PROCEDURE — 99284 EMERGENCY DEPT VISIT MOD MDM: CPT

## 2025-06-29 PROCEDURE — 83615 LACTATE (LD) (LDH) ENZYME: CPT

## 2025-06-29 PROCEDURE — 86850 RBC ANTIBODY SCREEN: CPT

## 2025-06-29 PROCEDURE — 84702 CHORIONIC GONADOTROPIN TEST: CPT

## 2025-06-29 PROCEDURE — 86900 BLOOD TYPING SEROLOGIC ABO: CPT

## 2025-06-29 PROCEDURE — 85730 THROMBOPLASTIN TIME PARTIAL: CPT

## 2025-06-29 PROCEDURE — 86901 BLOOD TYPING SEROLOGIC RH(D): CPT

## 2025-06-29 PROCEDURE — 85610 PROTHROMBIN TIME: CPT

## 2025-06-29 PROCEDURE — 83735 ASSAY OF MAGNESIUM: CPT

## 2025-06-29 PROCEDURE — 80053 COMPREHEN METABOLIC PANEL: CPT

## 2025-06-29 PROCEDURE — 84550 ASSAY OF BLOOD/URIC ACID: CPT

## 2025-06-29 RX ORDER — ACETAMINOPHEN 500 MG/5ML
1000 LIQUID (ML) ORAL ONCE
Refills: 0 | Status: COMPLETED | OUTPATIENT
Start: 2025-06-29 | End: 2025-06-29

## 2025-06-29 RX ADMIN — Medication 500 MILLILITER(S): at 21:43

## 2025-06-29 RX ADMIN — Medication 400 MILLIGRAM(S): at 21:43

## 2025-06-29 NOTE — CONSULT NOTE ADULT - SUBJECTIVE AND OBJECTIVE BOX
******NOTE NOT FINALIZED*******    Gyn Consult Note  MAYANK BARROSO  40y  Female [id number]    HPI:  39yo  PPD#4 s/p  with 2nd degree laceration EBL:250ccs presents to the ED today with heavy vaginal bleeding x 1 day. Pt reports that she was soaking a pad an hour for a couple of hours today and required using a towel due to heavy vaginal bleeding. Patient reports that since in the ED she feels a gush of blood that soaks her pad every time she stands up. Pt reports intermittent abdominal cramping, denies abdominal pain. Pt denies dizziness/lightheadedness, CP/SOB, palpitations, syncope.    In the ED, patient is hemodynamically stable. Pain is well controlled s/p IV tylenol.      Name of GYN Physician: Dr. Serrano    OBHx: P1:  FT , uncomplicated  P2:  FT 25 TOP x1 s/p D&E (T18)   SAB x1, no D&C  GYNHx: hx abnormal pap with subsequent normal paps, hx oral HSV, Denies fibroids, cysts, endometriosis, STI's, abnormal pap smears   PMH: hx melanoma s/p excision (20+years ago), asthma, psoriasis  PSH: breast augmentation and removal, D&C x1 ( for adhesions), D&E x1 (), melanoma excision, tonsillectomy  Meds: PNV, tylenol prn, motrin prn  All: NKDA  Soc: denies tobacco, alcohol or recreational drug use      Physical Exam:  Patient declined Chaperone for exam  General: sitting comfortably in bed, NAD   CV: extremities well perfused  Lungs: breathing well on RA, speaking in full sentences.  Back: No CVA tenderness  Abd: Soft, non-tender, non-distended. Bowel sounds present.    :  about 50% blue ondina soaked with bright red blood over 30 minutes. External labia wnl.  Speculum Exam: Upon speculum placement, about 30ccs of dark red blood with clots in vaginal vault. Slow active ooze seen from external os, no vaginal or cervical lacerations appreciated.  Ext: non-tender b/l       T(C): 36.8 (25 @ 20:21), Max: 36.8 (25 @ 20:21)  HR: 85 (25 @ 20:21) (85 - 85)  BP: 136/84 (25 @ 20:21) (136/84 - 136/84)  RR: 18 (25 @ 20:21) (18 - 18)  SpO2: 99% (25 @ 20:21) (99% - 99%)      LABS:                              11.9   16.07 )-----------( 316      ( 2025 20:52 )             35.1         139  |  102  |  22  ----------------------------<  88  4.2   |  23  |  0.85    Ca    9.6      2025 20:52  Mg     2.0         TPro  6.4  /  Alb  4.0  /  TBili  0.2  /  DBili  x   /  AST  30  /  ALT  43  /  AlkPhos  100      I&O's Detail    PT/INR - ( 2025 20:52 )   PT: 9.6 sec;   INR: 0.83 ratio         PTT - ( 2025 20:52 )  PTT:25.8 sec  Urinalysis Basic - ( 2025 20:52 )    Color: x / Appearance: x / SG: x / pH: x  Gluc: 88 mg/dL / Ketone: x  / Bili: x / Urobili: x   Blood: x / Protein: x / Nitrite: x   Leuk Esterase: x / RBC: x / WBC x   Sq Epi: x / Non Sq Epi: x / Bacteria: x          RADIOLOGY & ADDITIONAL STUDIES:     ******NOTE NOT FINALIZED*******    Gyn Consult Note  MAYANK BARROSO  40y  Female [id number]    HPI:  39yo  PPD#4 s/p  with 2nd degree laceration EBL:250ccs presents to the ED today with heavy vaginal bleeding x 1 day. Pt reports that she was soaking a pad an hour for a couple of hours today and required using a towel due to heavy vaginal bleeding. Patient reports that since in the ED she feels a gush of blood that soaks her pad every time she stands up. Pt reports intermittent abdominal cramping, denies abdominal pain. Pt denies dizziness/lightheadedness, CP/SOB, palpitations, syncope.    In the ED, patient is hemodynamically stable. Pain is well controlled s/p IV tylenol.      Name of GYN Physician: Dr. Serrano    OBHx: P1:  FT , uncomplicated  P2:  FT 25 TOP x1 s/p D&E (T18)   SAB x1, no D&C  GYNHx: hx abnormal pap with subsequent normal paps, hx oral HSV, Denies fibroids, cysts, endometriosis, STI's, abnormal pap smears   PMH: hx melanoma s/p excision (20+years ago), asthma, psoriasis  PSH: breast augmentation and removal, D&C x1 ( for adhesions), D&E x1 (), melanoma excision, tonsillectomy  Meds: PNV, tylenol prn, motrin prn  All: NKDA  Soc: denies tobacco, alcohol or recreational drug use      Physical Exam:  Patient declined Chaperone for exam  General: sitting comfortably in bed, NAD   CV: extremities well perfused  Lungs: breathing well on RA, speaking in full sentences.  Back: No CVA tenderness  Abd: Soft, non-tender, non-distended. Bowel sounds present.    :  about 50% blue ondina soaked with bright red blood over 30 minutes. External labia wnl. Perineal sutures intact  Speculum Exam: Upon speculum placement, about 30ccs of dark red blood with clots in vaginal vault. Slow active ooze seen from external os, no vaginal or cervical lacerations appreciated.  Ext: non-tender b/l       T(C): 36.8 (25 @ 20:21), Max: 36.8 (25 @ 20:21)  HR: 85 (25 @ 20:21) (85 - 85)  BP: 136/84 (25 @ 20:21) (136/84 - 136/84)  RR: 18 (25 @ 20:21) (18 - 18)  SpO2: 99% (25 @ 20:21) (99% - 99%)      LABS:                              11.9   16.07 )-----------( 316      ( 2025 20:52 )             35.1         139  |  102  |  22  ----------------------------<  88  4.2   |  23  |  0.85    Ca    9.6      2025 20:52  Mg     2.0         TPro  6.4  /  Alb  4.0  /  TBili  0.2  /  DBili  x   /  AST  30  /  ALT  43  /  AlkPhos  100      I&O's Detail    PT/INR - ( 2025 20:52 )   PT: 9.6 sec;   INR: 0.83 ratio         PTT - ( 2025 20:52 )  PTT:25.8 sec  Urinalysis Basic - ( 2025 20:52 )    Color: x / Appearance: x / SG: x / pH: x  Gluc: 88 mg/dL / Ketone: x  / Bili: x / Urobili: x   Blood: x / Protein: x / Nitrite: x   Leuk Esterase: x / RBC: x / WBC x   Sq Epi: x / Non Sq Epi: x / Bacteria: x          RADIOLOGY & ADDITIONAL STUDIES:     Gyn Consult Note  MAYANK BARROSO  40y  Female [id number]    HPI:  39yo  PPD#4 s/p  with 2nd degree laceration EBL:250ccs presents to the ED today with heavy vaginal bleeding x 1 day. Pt reports that she was soaking a pad an hour for a couple of hours today and required using a towel due to heavy vaginal bleeding. Patient reports that since in the ED she feels a gush of blood that soaks her pad every time she stands up. She has passed some clots, but not larger than a fist. Pt reports intermittent abdominal cramping, denies abdominal pain. Pt denies dizziness/lightheadedness, CP/SOB, palpitations, syncope.    In the ED, patient is hemodynamically stable. Pain is well controlled s/p IV tylenol. H/H on arrival 11.9/35.1.      Name of GYN Physician: Dr. Serrano    OBHx: P1:  FT , uncomplicated  P2:  FT 25 TOP x1 s/p D&E (T18)   SAB x1, no D&C  GYNHx: hx abnormal pap with subsequent normal paps, hx oral HSV, Denies fibroids, cysts, endometriosis, STI's, abnormal pap smears   PMH: hx melanoma s/p excision (20+years ago), asthma, psoriasis  PSH: breast augmentation and removal, D&C x1 ( for adhesions), D&E x1 (), melanoma excision, tonsillectomy  Meds: PNV, tylenol prn, motrin prn  All: NKDA  Soc: denies tobacco, alcohol or recreational drug use      Physical Exam:  Patient declined Chaperone for exam  General: sitting comfortably in bed, NAD   CV: extremities well perfused  Lungs: breathing well on RA, speaking in full sentences.  Back: No CVA tenderness  Abd: Soft, non-tender, non-distended. Bowel sounds present.    :  about 50% blue ondina soaked with bright red blood over 30 minutes. External labia wnl. Perineal sutures intact  Speculum Exam: Upon speculum placement, about 30ccs of dark red blood with clots in vaginal vault. Slow active ooze seen from external os, no vaginal or cervical lacerations appreciated.  Ext: non-tender b/l       T(C): 36.8 (25 @ 20:21), Max: 36.8 (25 @ 20:21)  HR: 85 (25 @ 20:21) (85 - 85)  BP: 136/84 (25 @ 20:21) (136/84 - 136/84)  RR: 18 (25 @ 20:21) (18 - 18)  SpO2: 99% (25 @ 20:21) (99% - 99%)      LABS:                              11.9   16.07 )-----------( 316      ( 2025 20:52 )             35.1         139  |  102  |  22  ----------------------------<  88  4.2   |  23  |  0.85    Ca    9.6      2025 20:52  Mg     2.0         TPro  6.4  /  Alb  4.0  /  TBili  0.2  /  DBili  x   /  AST  30  /  ALT  43  /  AlkPhos  100      I&O's Detail    PT/INR - ( 2025 20:52 )   PT: 9.6 sec;   INR: 0.83 ratio         PTT - ( 2025 20:52 )  PTT:25.8 sec  Urinalysis Basic - ( 2025 20:52 )    Color: x / Appearance: x / SG: x / pH: x  Gluc: 88 mg/dL / Ketone: x  / Bili: x / Urobili: x   Blood: x / Protein: x / Nitrite: x   Leuk Esterase: x / RBC: x / WBC x   Sq Epi: x / Non Sq Epi: x / Bacteria: x          RADIOLOGY & ADDITIONAL STUDIES:    < from: US Pelvis Complete (US Pelvis Complete .) (25 @ 00:43) >  EXAM:  US PELVIC COMPLETE   ORDERED BY:  SONJA AGUIRRE DATE:  2025          INTERPRETATION:  CLINICAL INFORMATION: Postpartum vaginal bleeding.    COMPARISON: Pelvic ultrasound 2022    TECHNIQUE:  Transabdominal pelvic sonogram only. Color and Spectral Doppler was   performed.    FINDINGS:  Uterus: 17.9 cm x 8.7 cm x 11.0 cm. Enlarged uterus.  Endometrium: Thickened heterogeneously echogenic endometrium measuring up   to 23 mm. Lower segment avascular heterogeneously echogenic collection   measuring 5.1 x 4.2 x 5.4 cm, likely clot.    Right ovary: Not visualized.  Left ovary: Not visualized.    Fluid: None.    IMPRESSION:  Thickened heterogeneously echogenic endometrium with large clot in the   loweruterine segment measuring up to 5.4 cm. No areas of vascular flow   to suggest retained products of conception.        --- End of Report ---          HILDA NICOLAS MD; Resident Radiologist  This document has been electronically signed.  ARIES PRUITT MD; Attending Radiologist  This document has been electronically signed. 2025  3:44AM    < end of copied text >   Gyn Consult Note  MAYANK BARROSO  40y  Female [id number]    HPI:  39yo  PPD#4 s/p  with 2nd degree laceration EBL:250ccs presents to the ED today with heavy vaginal bleeding x 1 day. Pt reports that she was soaking a pad an hour for a couple of hours today and required using a towel due to heavy vaginal bleeding. Patient reports that since in the ED she feels a gush of blood that soaks her pad every time she stands up. She has passed some clots, but not larger than a fist. Pt reports intermittent abdominal cramping, denies abdominal pain. Pt denies dizziness/lightheadedness, CP/SOB, palpitations, syncope.    In the ED, patient is hemodynamically stable. Pain is well controlled s/p IV tylenol. H/H on arrival 11.9/35.1.    Name of GYN Physician: Dr. Serrano    OBHx: P1:  FT , uncomplicated  P2:  FT 25 TOP x1 s/p D&E (T18)   SAB x1, no D&C  GYNHx: hx abnormal pap with subsequent normal paps, hx oral HSV, Denies fibroids, cysts, endometriosis, STI's, abnormal pap smears   PMH: hx melanoma s/p excision (20+years ago), asthma, psoriasis  PSH: breast augmentation and removal, D&C x1 ( for adhesions), D&E x1 (), melanoma excision, tonsillectomy  Meds: PNV, tylenol prn, motrin prn  All: NKDA  Soc: denies tobacco, alcohol or recreational drug use      Physical Exam:  Patient declined Chaperone for exam  General: sitting comfortably in bed, NAD   CV: extremities well perfused  Lungs: breathing well on RA, speaking in full sentences.  Back: No CVA tenderness  Abd: Soft, non-tender, non-distended. Bowel sounds present.    :  about 50% blue ondina soaked with bright red blood over 30 minutes. External labia wnl. Perineal sutures intact  Speculum Exam: Upon speculum placement, about 30ccs of dark red blood with clots in vaginal vault. Slow active ooze seen from external os, no vaginal or cervical lacerations appreciated.  Ext: non-tender b/l       T(C): 36.8 (25 @ 20:21), Max: 36.8 (25 @ 20:21)  HR: 85 (25 @ 20:21) (85 - 85)  BP: 136/84 (25 @ 20:21) (136/84 - 136/84)  RR: 18 (25 @ 20:21) (18 - 18)  SpO2: 99% (25 @ 20:21) (99% - 99%)      LABS:                              11.9   16.07 )-----------( 316      ( 2025 20:52 )             35.1         139  |  102  |  22  ----------------------------<  88  4.2   |  23  |  0.85    Ca    9.6      2025 20:52  Mg     2.0         TPro  6.4  /  Alb  4.0  /  TBili  0.2  /  DBili  x   /  AST  30  /  ALT  43  /  AlkPhos  100      I&O's Detail    PT/INR - ( 2025 20:52 )   PT: 9.6 sec;   INR: 0.83 ratio         PTT - ( 2025 20:52 )  PTT:25.8 sec  Urinalysis Basic - ( 2025 20:52 )    Color: x / Appearance: x / SG: x / pH: x  Gluc: 88 mg/dL / Ketone: x  / Bili: x / Urobili: x   Blood: x / Protein: x / Nitrite: x   Leuk Esterase: x / RBC: x / WBC x   Sq Epi: x / Non Sq Epi: x / Bacteria: x          RADIOLOGY & ADDITIONAL STUDIES:    < from: US Pelvis Complete (US Pelvis Complete .) (25 @ 00:43) >  EXAM:  US PELVIC COMPLETE   ORDERED BY:  SONJA AGUIRRE DATE:  2025          INTERPRETATION:  CLINICAL INFORMATION: Postpartum vaginal bleeding.    COMPARISON: Pelvic ultrasound 2022    TECHNIQUE:  Transabdominal pelvic sonogram only. Color and Spectral Doppler was   performed.    FINDINGS:  Uterus: 17.9 cm x 8.7 cm x 11.0 cm. Enlarged uterus.  Endometrium: Thickened heterogeneously echogenic endometrium measuring up   to 23 mm. Lower segment avascular heterogeneously echogenic collection   measuring 5.1 x 4.2 x 5.4 cm, likely clot.    Right ovary: Not visualized.  Left ovary: Not visualized.    Fluid: None.    IMPRESSION:  Thickened heterogeneously echogenic endometrium with large clot in the   loweruterine segment measuring up to 5.4 cm. No areas of vascular flow   to suggest retained products of conception.        --- End of Report ---          HILDA NICOLAS MD; Resident Radiologist  This document has been electronically signed.  ARIES PRUITT MD; Attending Radiologist  This document has been electronically signed. 2025  3:44AM    < end of copied text >

## 2025-06-29 NOTE — ED PROVIDER NOTE - CLINICAL SUMMARY MEDICAL DECISION MAKING FREE TEXT BOX
40-year-old female, , recent scheduled induction of vaginal delivery 5 days ago at 39 weeks, presenting with postpartum bleeding.  Patient reports that an hour prior to arrival, she squatted down and noticed a gush of blood.  Patient reports that she soaked through a pad and then had to use a towel for blood.  Patient denies any dizziness, chest pain, shortness of breath,.  Denies any abdominal pain.  Does endorse some lower pelvic cramping.  Denies fevers/chills, headache, right upper quadrant abdominal pain, petechiae, visual changes.  Denies any history of clotting disorders.    Vital signs show hypertension, otherwise nonactionable vital signs.    GENERAL: Mildly anxious  HEAD:  Atraumatic, Normocephalic  EYES: EOMI, conjunctiva and sclera clear  ENT: Moist mucous membranes  CHEST/LUNG: Unlabored respirations. Clear to auscultation bilaterally. No rales, rhonchi, wheezing, or rubs  HEART: Regular rate and rhythm. No murmurs, rubs, or gallops  ABDOMEN: Soft, nondistended, nontender  EXTREMITIES:  No cyanosis or edema.   NERVOUS SYSTEM:  No focal deficits.   PELVIC: Performed by Dr. Mor Fermin; outer stitches intact. No active bleeding but pooling with coughing/bearing down    Differential include but not limited to retained products, coagulopathy, bleeding from the laceration. 40-year-old female, , recent scheduled induction of vaginal delivery 5 days ago at 39 weeks, presenting with postpartum bleeding.  Patient reports that an hour prior to arrival, she squatted down and noticed a gush of blood.  Patient reports that she soaked through a pad and then had to use a towel for blood.  Patient denies any dizziness, chest pain, shortness of breath,.  Denies any abdominal pain.  Does endorse some lower pelvic cramping.  Denies fevers/chills, headache, right upper quadrant abdominal pain, petechiae, visual changes.  Denies any history of clotting disorders.    Vital signs show hypertension, otherwise nonactionable vital signs.    GENERAL: Mildly anxious  HEAD:  Atraumatic, Normocephalic  EYES: EOMI, conjunctiva and sclera clear  ENT: Moist mucous membranes  CHEST/LUNG: Unlabored respirations. Clear to auscultation bilaterally. No rales, rhonchi, wheezing, or rubs  HEART: Regular rate and rhythm. No murmurs, rubs, or gallops  ABDOMEN: Soft, nondistended, nontender  EXTREMITIES:  No cyanosis or edema.   NERVOUS SYSTEM:  No focal deficits.   PELVIC: Performed by Dr. Mor Fermin; outer stitches intact. No active bleeding but pooling with coughing/bearing down    Differential include but not limited to retained products, coagulopathy, bleeding from the laceration.    STEFANIE   40-year-old female history of G4, P2 status post vaginal delivery on Wednesday presents with acute onset of vaginal bleeding today.  Patient states she was giving her son a bath when she started developing acute bleeding.  Denies any abdominal pain, nausea, vomiting, diarrhea, fevers chills and was doing well overall.  On exam patient is awake and alert x 3, abdomen soft nontender, pelvic exam with mild bleeding in the vault with clots, sutures intact.  Will consult OB, obtain type and screen, labs and monitor.

## 2025-06-29 NOTE — ED PROVIDER NOTE - PROGRESS NOTE DETAILS
Erica Alarcon, PGY-3: d/w obgyn resident and attending. recommending Methergine.   rx sent to pharmacy.     discussed plan with patient, as well as return precautions. Patient in agreement with plan. Feels well enough to go home.

## 2025-06-29 NOTE — ED ADULT NURSE NOTE - NSFALLHARMRISKINTERV_ED_ALL_ED

## 2025-06-29 NOTE — CONSULT NOTE ADULT - NS PANP COMMENT GEN_ALL_CORE FT
agree with above  pt seen in ED  pt resting comfortable now reporting minimal VB  pt tolerated ambulating  will discharge home on methergine series  bleeding precautions reviewed  will f/u in office this week  JR Charli PATINO

## 2025-06-29 NOTE — ED ADULT NURSE REASSESSMENT NOTE - NS ED NURSE REASSESS COMMENT FT1
Pt requesting to use restroom. Per MD Kaur, pt ok to ambulate to restroom. Pt prefers to use bed pan. Pt assisted onto bedpan. Soiled pads changed. Hematuria noted. Patient denies any pain currently.

## 2025-06-29 NOTE — CONSULT NOTE ADULT - ASSESSMENT
A/P:  41yo  PPD#4 s/p  with 2nd degree laceration EBL: 250ccs presents to the ED today with heavy vaginal bleeding x few hours. TVUS showing On exam, patient with slow ooze from cervical os. Patient otherwise asymptomatic and hemodynamically stable in ED. H/H: 11.9/35.1. A/P:  41yo  PPD#4 s/p  with 2nd degree laceration EBL: 250ccs presents to the ED today with heavy vaginal bleeding x few hours. TVUS showing large clot in LAWANDA, no retained POCs. On exam, patient with slow ooze from cervical os. Patient otherwise asymptomatic and hemodynamically stable in ED. H/H: 11.9/35.1->10.5/30.5. Vaginal bleeding likely 2/2 large clot.    -Will recommend starting pt on methergine series. Methergine 0.2mg Q4hr x 6 doses.  -D/W pt that she may continue to have vaginal bleeding and continue to pass clots.  -Pt to f/u in clinic with primary OBGYN this week.  -Patient is stable from an OBGYN standpoint.  -Rest of care per ED team.    D/W CAREY Lima, PGY1  A/P:  41yo  PPD#4 s/p  with 2nd degree laceration EBL: 250ccs presents to the ED today with heavy vaginal bleeding x few hours. TVUS showing large clot in LAWANDA, no retained POCs. On exam, patient with slow ooze from cervical os. Patient otherwise asymptomatic and hemodynamically stable in ED. H/H: 11.9/35.1->10.5/30.5. Vaginal bleeding likely 2/2 large clot.    -Will recommend starting pt on methergine series. Methergine 0.2mg Q4hr x 6 doses. 1st dose should be Methergine 0.2mg IM in ED, next 5 doses will be PO and will be sent to her pharmacy.  -D/W pt that she may continue to have vaginal bleeding and continue to pass clots.  -Pt to f/u in clinic with primary OBGYN this week.  -Patient is stable from an OBGYN standpoint.  -Rest of care per ED team.    D/W CAREY Lima, PGY1

## 2025-06-29 NOTE — ED ADULT NURSE NOTE - OBJECTIVE STATEMENT
40Y F BIB self from home p/w c/o vaginal bleeding. A&Ox4, ambulatory at baseline without assistive devices, . Pt reports scheduled induction on Tuesday, , w/o complications, was discharged Thursday. States 1hr of bright red vaginal bleeding with clots 1 hour prior to ED arrival with intermittent abd cramping, 2/10 on pain scale. Pt reports increased bleeding upon movement, unable to quantify how many pads used. Endorsing chronic R hand swelling with some redness since pregnancy. Upon assessment, abd non tender upon palpation, reports abd soreness, pad saturated with blood and clots. Denies any cp, sob, n/v/d/f/c/headache/dizziness, vision changes.  at bedside, no other complaints at this time, safety & comfort maintained.

## 2025-06-29 NOTE — ED PROVIDER NOTE - NSFOLLOWUPINSTRUCTIONS_ED_ALL_ED_FT
You were seen in the emergency department for vaginal bleeding. You were found to have a clot in the uterus.    1) Follow-up with your ob/gyn within 1 week.    2) Continue to take all medications as prescribed. Methergine was sent to your pharmacy to help you pass the blood clot seen on ultrasound    3) Rest and stay hydrated. Pain can be managed with Acetaminophen (aka Tylenol) and Ibuprofen (Motrin or Advil)  over the counter as directed.    4) Return to the ER for any new or worsening symptoms. Signs to look out for include severe pain, fevers, dizziness, chest pain, shortness of breath, worsening bleeding, or any other worsening or concerning symptoms.      Please read all attached patient information.

## 2025-06-29 NOTE — ED PROVIDER NOTE - PATIENT PORTAL LINK FT
You can access the FollowMyHealth Patient Portal offered by [facility] by registering at the following website: [email or web address] By joining [employer]’s FollowMyHealth portal, you will also be able to view your health information using other applications (apps) compatible with our system.

## 2025-06-29 NOTE — ED ADULT TRIAGE NOTE - CHIEF COMPLAINT QUOTE
Post vaginal delivery four days ago, reports heavy vaginal bleeding started today. Patient unable to quantify amount of pads. Reports mild abdominal cramping.

## 2025-06-30 VITALS
HEART RATE: 69 BPM | SYSTOLIC BLOOD PRESSURE: 119 MMHG | OXYGEN SATURATION: 98 % | TEMPERATURE: 99 F | DIASTOLIC BLOOD PRESSURE: 65 MMHG | RESPIRATION RATE: 20 BRPM

## 2025-06-30 LAB
BASOPHILS # BLD AUTO: 0.06 K/UL — SIGNIFICANT CHANGE UP (ref 0–0.2)
BASOPHILS NFR BLD AUTO: 0.4 % — SIGNIFICANT CHANGE UP (ref 0–2)
EOSINOPHIL # BLD AUTO: 0.17 K/UL — SIGNIFICANT CHANGE UP (ref 0–0.5)
EOSINOPHIL NFR BLD AUTO: 1.2 % — SIGNIFICANT CHANGE UP (ref 0–6)
HCT VFR BLD CALC: 30.5 % — LOW (ref 34.5–45)
HGB BLD-MCNC: 10.5 G/DL — LOW (ref 11.5–15.5)
IMM GRANULOCYTES # BLD AUTO: 0.1 K/UL — HIGH (ref 0–0.07)
IMM GRANULOCYTES NFR BLD AUTO: 0.7 % — SIGNIFICANT CHANGE UP (ref 0–0.9)
LYMPHOCYTES # BLD AUTO: 2.58 K/UL — SIGNIFICANT CHANGE UP (ref 1–3.3)
LYMPHOCYTES NFR BLD AUTO: 19 % — SIGNIFICANT CHANGE UP (ref 13–44)
MCHC RBC-ENTMCNC: 30.6 PG — SIGNIFICANT CHANGE UP (ref 27–34)
MCHC RBC-ENTMCNC: 34.4 G/DL — SIGNIFICANT CHANGE UP (ref 32–36)
MCV RBC AUTO: 88.9 FL — SIGNIFICANT CHANGE UP (ref 80–100)
MONOCYTES # BLD AUTO: 0.77 K/UL — SIGNIFICANT CHANGE UP (ref 0–0.9)
MONOCYTES NFR BLD AUTO: 5.7 % — SIGNIFICANT CHANGE UP (ref 2–14)
NEUTROPHILS # BLD AUTO: 9.93 K/UL — HIGH (ref 1.8–7.4)
NEUTROPHILS NFR BLD AUTO: 73 % — SIGNIFICANT CHANGE UP (ref 43–77)
NRBC # BLD AUTO: 0 K/UL — SIGNIFICANT CHANGE UP (ref 0–0)
NRBC # FLD: 0 K/UL — SIGNIFICANT CHANGE UP (ref 0–0)
NRBC BLD AUTO-RTO: 0 /100 WBCS — SIGNIFICANT CHANGE UP (ref 0–0)
PLATELET # BLD AUTO: 281 K/UL — SIGNIFICANT CHANGE UP (ref 150–400)
PMV BLD: 8.8 FL — SIGNIFICANT CHANGE UP (ref 7–13)
RBC # BLD: 3.43 M/UL — LOW (ref 3.8–5.2)
RBC # FLD: 13.3 % — SIGNIFICANT CHANGE UP (ref 10.3–14.5)
WBC # BLD: 13.61 K/UL — HIGH (ref 3.8–10.5)
WBC # FLD AUTO: 13.61 K/UL — HIGH (ref 3.8–10.5)

## 2025-06-30 PROCEDURE — 83615 LACTATE (LD) (LDH) ENZYME: CPT

## 2025-06-30 PROCEDURE — 86901 BLOOD TYPING SEROLOGIC RH(D): CPT

## 2025-06-30 PROCEDURE — 80053 COMPREHEN METABOLIC PANEL: CPT

## 2025-06-30 PROCEDURE — 81001 URINALYSIS AUTO W/SCOPE: CPT

## 2025-06-30 PROCEDURE — 83880 ASSAY OF NATRIURETIC PEPTIDE: CPT

## 2025-06-30 PROCEDURE — 85025 COMPLETE CBC W/AUTO DIFF WBC: CPT

## 2025-06-30 PROCEDURE — 83735 ASSAY OF MAGNESIUM: CPT

## 2025-06-30 PROCEDURE — 76856 US EXAM PELVIC COMPLETE: CPT | Mod: 26

## 2025-06-30 PROCEDURE — 76856 US EXAM PELVIC COMPLETE: CPT

## 2025-06-30 PROCEDURE — 84550 ASSAY OF BLOOD/URIC ACID: CPT

## 2025-06-30 PROCEDURE — 85730 THROMBOPLASTIN TIME PARTIAL: CPT

## 2025-06-30 PROCEDURE — 85610 PROTHROMBIN TIME: CPT

## 2025-06-30 PROCEDURE — 86900 BLOOD TYPING SEROLOGIC ABO: CPT

## 2025-06-30 PROCEDURE — 86850 RBC ANTIBODY SCREEN: CPT

## 2025-06-30 PROCEDURE — 84702 CHORIONIC GONADOTROPIN TEST: CPT

## 2025-06-30 RX ADMIN — Medication 0.2 MILLIGRAM(S): at 06:14

## 2025-06-30 NOTE — ED ADULT NURSE REASSESSMENT NOTE - NS ED NURSE REASSESS COMMENT FT1
Pt returned from US. Pt assisted onto bedpan. Soiled pads changed. Hematuria noted. Patient denies any pain currently.

## 2025-06-30 NOTE — ED ADULT NURSE REASSESSMENT NOTE - NS ED NURSE REASSESS COMMENT FT1
Pt placed on bed pan. Hematuria noted on bed pan. Soiled linen & pads changed. VSS, denies any pain, dizziness, headache. No other complaints at this time, safety & comfort maintained.

## 2025-07-07 ENCOUNTER — APPOINTMENT (OUTPATIENT)
Dept: OBGYN | Facility: CLINIC | Age: 40
End: 2025-07-07
Payer: COMMERCIAL

## 2025-07-07 PROCEDURE — 76830 TRANSVAGINAL US NON-OB: CPT

## 2025-07-07 PROCEDURE — 99213 OFFICE O/P EST LOW 20 MIN: CPT | Mod: 25

## 2025-07-07 PROCEDURE — 76856 US EXAM PELVIC COMPLETE: CPT

## 2025-07-09 ENCOUNTER — TRANSCRIPTION ENCOUNTER (OUTPATIENT)
Age: 40
End: 2025-07-09

## 2025-07-09 ENCOUNTER — OUTPATIENT (OUTPATIENT)
Dept: OUTPATIENT SERVICES | Facility: HOSPITAL | Age: 40
LOS: 1 days | End: 2025-07-09
Payer: COMMERCIAL

## 2025-07-09 ENCOUNTER — APPOINTMENT (OUTPATIENT)
Dept: OBGYN | Facility: HOSPITAL | Age: 40
End: 2025-07-09

## 2025-07-09 VITALS
DIASTOLIC BLOOD PRESSURE: 57 MMHG | HEART RATE: 61 BPM | HEIGHT: 64 IN | RESPIRATION RATE: 16 BRPM | WEIGHT: 154.98 LBS | OXYGEN SATURATION: 99 % | TEMPERATURE: 98 F | SYSTOLIC BLOOD PRESSURE: 94 MMHG

## 2025-07-09 VITALS
HEART RATE: 53 BPM | RESPIRATION RATE: 20 BRPM | OXYGEN SATURATION: 100 % | DIASTOLIC BLOOD PRESSURE: 55 MMHG | SYSTOLIC BLOOD PRESSURE: 106 MMHG

## 2025-07-09 DIAGNOSIS — C43.9 MALIGNANT MELANOMA OF SKIN, UNSPECIFIED: Chronic | ICD-10-CM

## 2025-07-09 DIAGNOSIS — Z90.89 ACQUIRED ABSENCE OF OTHER ORGANS: Chronic | ICD-10-CM

## 2025-07-09 DIAGNOSIS — Z98.890 OTHER SPECIFIED POSTPROCEDURAL STATES: Chronic | ICD-10-CM

## 2025-07-09 LAB
BLD GP AB SCN SERPL QL: NEGATIVE — SIGNIFICANT CHANGE UP
RH IG SCN BLD-IMP: POSITIVE — SIGNIFICANT CHANGE UP

## 2025-07-09 PROCEDURE — 86901 BLOOD TYPING SEROLOGIC RH(D): CPT

## 2025-07-09 PROCEDURE — 86900 BLOOD TYPING SEROLOGIC ABO: CPT

## 2025-07-09 PROCEDURE — 58558 HYSTEROSCOPY BIOPSY: CPT

## 2025-07-09 PROCEDURE — 88305 TISSUE EXAM BY PATHOLOGIST: CPT

## 2025-07-09 PROCEDURE — 88305 TISSUE EXAM BY PATHOLOGIST: CPT | Mod: 26

## 2025-07-09 PROCEDURE — 86850 RBC ANTIBODY SCREEN: CPT

## 2025-07-09 RX ORDER — ONDANSETRON HCL/PF 4 MG/2 ML
4 VIAL (ML) INJECTION ONCE
Refills: 0 | Status: DISCONTINUED | OUTPATIENT
Start: 2025-07-09 | End: 2025-07-23

## 2025-07-09 RX ORDER — OXYCODONE HYDROCHLORIDE 30 MG/1
5 TABLET ORAL ONCE
Refills: 0 | Status: DISCONTINUED | OUTPATIENT
Start: 2025-07-09 | End: 2025-07-09

## 2025-07-09 NOTE — ASU DISCHARGE PLAN (ADULT/PEDIATRIC) - NS MD DC FALL RISK RISK
For information on Fall & Injury Prevention, visit: [email or web address] OR  [email or web address] OR  https://www.cdc.gov/steadi/patient.html

## 2025-07-09 NOTE — ASU DISCHARGE PLAN (ADULT/PEDIATRIC) - FINANCIAL ASSISTANCE
[facility] provides services at a reduced cost to those who are determined to be eligible through [facility]’s financial assistance program. Information regarding [facility]’s financial assistance program can be found by going to [email or web address] or by calling [phone].

## 2025-07-09 NOTE — BRIEF OPERATIVE NOTE - NSICDXBRIEFPOSTOP_GEN_ALL_CORE_FT
POST-OP DIAGNOSIS:  Retained portions of placenta and membranes, without hemorrhage 09-Jul-2025 07:58:20  Tyrell Serrano

## 2025-07-09 NOTE — ASU DISCHARGE PLAN (ADULT/PEDIATRIC) - CARE PROVIDER_API CALL
Tyrell Serrano  Obstetrics & Gynecology  [address] [place] [address], [address]  [place] [address]  Phone: [phone]  Fax: [phone]  Follow Up Time:

## 2025-07-09 NOTE — H&P ADULT - NSVTERISKREFERASSESS_GEN_ALL_CORE
TCM DISCHARGE FOLLOW UP CALL    Discharge Date:  5/31/2019  Reason for hospital stay (discharge diagnosis)::  Abd pain, vomiting  Are you feeling better, the same or worse since your discharge?:  Patient is feeling the same (Yesterday pt hd the same upper abd pain he had while hospitalized. It waxed and waned in intensity. Tums helpd for about 3 hrs at a time. Tried Zofran even though he didn't have nausea.He is having regular BMs, no bolld. Food doesn't impact pain intensity.)  Do you feel like you have a plan in the event of a health emergency?: Yes (He talks witth his wife when he doesn't feel well.)    As part of your discharge plan, were  home care services ordered for you?: No    Did you receive any new medications, or was there a change to your medications?: Yes    Are you taking those medications, or do you have any established regiment?:  Pt is taking new meds as prescribed  Do you have any follow up visits scheduled with your PCP or Specialist?:  Yes, with PCP and Yes, with Specialist (6/5 Dr Martinez.)  (RN) Is PCP appt scheduled soon enough (within 14 days of discharge date)?: Yes    Who are you seeing and when is it scheduled?:  Dr Rose 7/2       Refer to the Assessment tab to view/cancel completed assessment.

## 2025-07-09 NOTE — ASU DISCHARGE PLAN (ADULT/PEDIATRIC) - NURSING INSTRUCTIONS
You received IV Tylenol, Ketorolac ( similar as Ibuprofen) in OR today.  You may take Tylenol, up to 1000mg/q6hr, not to exceed 4000mg/day and alternate with Ibuprofen, up to 400mg/q6hr, not to exceed 2400mg/day. You may take either one every 6 hours, you may alternate these medications so that you take one every 3 hours (e.g., Tylenol at 12pm, Ibuprofen at 3pm, Tylenol at 6pm, Ibuprofen at 9pm, etc...). Follow the maximum doses and administration instructions on the bottles.  OK to take Tylenol/Acetaminophen at / after 1:20PM______ for pain and every 6 hours after as needed.  OK to take Motrin/Ibuprofen at / after 1:20PM _____ for pain and every 6 hours after as needed.

## 2025-07-09 NOTE — BRIEF OPERATIVE NOTE - OPERATION/FINDINGS
uterus with retained poc's, easily removed with suction D&C----subsequent uterine atony and bleeding treated with iv pitocin (20 units), rectal cytotec (1000 mcg), and IM methergine (0.2 mg) with resolution

## 2025-07-09 NOTE — H&P ADULT - HISTORY OF PRESENT ILLNESS
s/p  2 weeks ago with pp bleeding, and sono noted to have retained poc's, admitted for suction D&C---rba reviewed

## 2025-07-09 NOTE — BRIEF OPERATIVE NOTE - NSICDXBRIEFPREOP_GEN_ALL_CORE_FT
PRE-OP DIAGNOSIS:  Retained products of conception after delivery without hemorrhage 09-Jul-2025 07:57:46  Tyrell Serrano

## 2025-07-09 NOTE — ASU PATIENT PROFILE, ADULT - PRO MENTAL HEALTH SX RECENT
Sodium 131 from yesterday's lab. NaCl running currently at 125. No further orders needed per anesthesia.  
none
0.83

## 2025-07-09 NOTE — BRIEF OPERATIVE NOTE - NSICDXBRIEFPROCEDURE_GEN_ALL_CORE_FT
PROCEDURES:  Diagnostic hysteroscopy 09-Jul-2025 07:58:52  Tyrell Serrano  Hysteroscopy with dilation of cervix and curettage procedure using suction 09-Jul-2025 07:59:20  Tyrell Serrano

## 2025-07-17 ENCOUNTER — APPOINTMENT (OUTPATIENT)
Dept: OBGYN | Facility: CLINIC | Age: 40
End: 2025-07-17

## 2025-07-17 LAB — SURGICAL PATHOLOGY STUDY: SIGNIFICANT CHANGE UP

## 2025-07-17 PROCEDURE — 99212 OFFICE O/P EST SF 10 MIN: CPT

## 2025-07-18 ENCOUNTER — APPOINTMENT (OUTPATIENT)
Dept: OBGYN | Facility: CLINIC | Age: 40
End: 2025-07-18
Payer: COMMERCIAL

## 2025-07-18 ENCOUNTER — TRANSCRIPTION ENCOUNTER (OUTPATIENT)
Age: 40
End: 2025-07-18

## 2025-07-18 PROCEDURE — 99212 OFFICE O/P EST SF 10 MIN: CPT

## 2025-07-19 ENCOUNTER — EMERGENCY (EMERGENCY)
Facility: HOSPITAL | Age: 40
LOS: 1 days | End: 2025-07-19
Attending: STUDENT IN AN ORGANIZED HEALTH CARE EDUCATION/TRAINING PROGRAM | Admitting: STUDENT IN AN ORGANIZED HEALTH CARE EDUCATION/TRAINING PROGRAM
Payer: COMMERCIAL

## 2025-07-19 VITALS
OXYGEN SATURATION: 99 % | HEART RATE: 84 BPM | WEIGHT: 149.91 LBS | DIASTOLIC BLOOD PRESSURE: 80 MMHG | SYSTOLIC BLOOD PRESSURE: 120 MMHG | HEIGHT: 64 IN | RESPIRATION RATE: 14 BRPM | TEMPERATURE: 98 F

## 2025-07-19 VITALS
SYSTOLIC BLOOD PRESSURE: 118 MMHG | DIASTOLIC BLOOD PRESSURE: 78 MMHG | OXYGEN SATURATION: 99 % | HEART RATE: 88 BPM | RESPIRATION RATE: 14 BRPM

## 2025-07-19 DIAGNOSIS — Z98.890 OTHER SPECIFIED POSTPROCEDURAL STATES: Chronic | ICD-10-CM

## 2025-07-19 DIAGNOSIS — C43.9 MALIGNANT MELANOMA OF SKIN, UNSPECIFIED: Chronic | ICD-10-CM

## 2025-07-19 DIAGNOSIS — Z90.89 ACQUIRED ABSENCE OF OTHER ORGANS: Chronic | ICD-10-CM

## 2025-07-19 LAB
ALBUMIN SERPL ELPH-MCNC: 3.1 G/DL — LOW (ref 3.3–5)
ALP SERPL-CCNC: 73 U/L — SIGNIFICANT CHANGE UP (ref 30–120)
ALT FLD-CCNC: 29 U/L — SIGNIFICANT CHANGE UP (ref 10–60)
ANION GAP SERPL CALC-SCNC: 7 MMOL/L — SIGNIFICANT CHANGE UP (ref 5–17)
APPEARANCE UR: CLEAR — SIGNIFICANT CHANGE UP
AST SERPL-CCNC: 21 U/L — SIGNIFICANT CHANGE UP (ref 10–40)
BACTERIA # UR AUTO: ABNORMAL /HPF
BASOPHILS # BLD AUTO: 0.04 K/UL — SIGNIFICANT CHANGE UP (ref 0–0.2)
BASOPHILS NFR BLD AUTO: 0.6 % — SIGNIFICANT CHANGE UP (ref 0–2)
BILIRUB SERPL-MCNC: 0.4 MG/DL — SIGNIFICANT CHANGE UP (ref 0.2–1.2)
BILIRUB UR-MCNC: NEGATIVE — SIGNIFICANT CHANGE UP
BUN SERPL-MCNC: 16 MG/DL — SIGNIFICANT CHANGE UP (ref 7–23)
CALCIUM SERPL-MCNC: 8.4 MG/DL — SIGNIFICANT CHANGE UP (ref 8.4–10.5)
CHLORIDE SERPL-SCNC: 103 MMOL/L — SIGNIFICANT CHANGE UP (ref 96–108)
CO2 SERPL-SCNC: 26 MMOL/L — SIGNIFICANT CHANGE UP (ref 22–31)
COLOR SPEC: SIGNIFICANT CHANGE UP
CREAT SERPL-MCNC: 1.14 MG/DL — SIGNIFICANT CHANGE UP (ref 0.5–1.3)
DIFF PNL FLD: ABNORMAL
EGFR: 62 ML/MIN/1.73M2 — SIGNIFICANT CHANGE UP
EGFR: 62 ML/MIN/1.73M2 — SIGNIFICANT CHANGE UP
EOSINOPHIL # BLD AUTO: 0.03 K/UL — SIGNIFICANT CHANGE UP (ref 0–0.5)
EOSINOPHIL NFR BLD AUTO: 0.5 % — SIGNIFICANT CHANGE UP (ref 0–6)
EPI CELLS # UR: PRESENT
GLUCOSE SERPL-MCNC: 93 MG/DL — SIGNIFICANT CHANGE UP (ref 70–99)
GLUCOSE UR QL: NEGATIVE MG/DL — SIGNIFICANT CHANGE UP
HCG SERPL-ACNC: 2 MIU/ML — SIGNIFICANT CHANGE UP
HCT VFR BLD CALC: 32.4 % — LOW (ref 34.5–45)
HGB BLD-MCNC: 10.7 G/DL — LOW (ref 11.5–15.5)
IMM GRANULOCYTES # BLD AUTO: 0.02 K/UL — SIGNIFICANT CHANGE UP (ref 0–0.07)
IMM GRANULOCYTES NFR BLD AUTO: 0.3 % — SIGNIFICANT CHANGE UP (ref 0–0.9)
KETONES UR QL: 15 MG/DL
LACTATE SERPL-SCNC: 0.8 MMOL/L — SIGNIFICANT CHANGE UP (ref 0.7–2)
LEUKOCYTE ESTERASE UR-ACNC: ABNORMAL
LIDOCAIN IGE QN: 32 U/L — SIGNIFICANT CHANGE UP (ref 16–77)
LYMPHOCYTES # BLD AUTO: 1.42 K/UL — SIGNIFICANT CHANGE UP (ref 1–3.3)
LYMPHOCYTES NFR BLD AUTO: 22.4 % — SIGNIFICANT CHANGE UP (ref 13–44)
MAGNESIUM SERPL-MCNC: 1.9 MG/DL — SIGNIFICANT CHANGE UP (ref 1.6–2.6)
MCHC RBC-ENTMCNC: 28.5 PG — SIGNIFICANT CHANGE UP (ref 27–34)
MCHC RBC-ENTMCNC: 33 G/DL — SIGNIFICANT CHANGE UP (ref 32–36)
MCV RBC AUTO: 86.2 FL — SIGNIFICANT CHANGE UP (ref 80–100)
MONOCYTES # BLD AUTO: 0.54 K/UL — SIGNIFICANT CHANGE UP (ref 0–0.9)
MONOCYTES NFR BLD AUTO: 8.5 % — SIGNIFICANT CHANGE UP (ref 2–14)
NEUTROPHILS # BLD AUTO: 4.29 K/UL — SIGNIFICANT CHANGE UP (ref 1.8–7.4)
NEUTROPHILS NFR BLD AUTO: 67.7 % — SIGNIFICANT CHANGE UP (ref 43–77)
NITRITE UR-MCNC: NEGATIVE — SIGNIFICANT CHANGE UP
NRBC # BLD AUTO: 0 K/UL — SIGNIFICANT CHANGE UP (ref 0–0)
NRBC # FLD: 0 K/UL — SIGNIFICANT CHANGE UP (ref 0–0)
NRBC BLD AUTO-RTO: 0 /100 WBCS — SIGNIFICANT CHANGE UP (ref 0–0)
PH UR: 5.5 — SIGNIFICANT CHANGE UP (ref 5–8)
PLATELET # BLD AUTO: 304 K/UL — SIGNIFICANT CHANGE UP (ref 150–400)
PMV BLD: 8.6 FL — SIGNIFICANT CHANGE UP (ref 7–13)
POTASSIUM SERPL-MCNC: 3.4 MMOL/L — LOW (ref 3.5–5.3)
POTASSIUM SERPL-SCNC: 3.4 MMOL/L — LOW (ref 3.5–5.3)
PROT SERPL-MCNC: 6.2 G/DL — SIGNIFICANT CHANGE UP (ref 6–8.3)
PROT UR-MCNC: 30 MG/DL
RBC # BLD: 3.76 M/UL — LOW (ref 3.8–5.2)
RBC # FLD: 12.8 % — SIGNIFICANT CHANGE UP (ref 10.3–14.5)
RBC CASTS # UR COMP ASSIST: 25 /HPF — HIGH (ref 0–4)
SODIUM SERPL-SCNC: 136 MMOL/L — SIGNIFICANT CHANGE UP (ref 135–145)
SP GR SPEC: 1.03 — HIGH (ref 1–1.03)
UROBILINOGEN FLD QL: 1 MG/DL — SIGNIFICANT CHANGE UP (ref 0.2–1)
WBC # BLD: 6.34 K/UL — SIGNIFICANT CHANGE UP (ref 3.8–10.5)
WBC # FLD AUTO: 6.34 K/UL — SIGNIFICANT CHANGE UP (ref 3.8–10.5)
WBC UR QL: 15 /HPF — HIGH (ref 0–5)

## 2025-07-19 PROCEDURE — 96361 HYDRATE IV INFUSION ADD-ON: CPT

## 2025-07-19 PROCEDURE — 87077 CULTURE AEROBIC IDENTIFY: CPT

## 2025-07-19 PROCEDURE — 85025 COMPLETE CBC W/AUTO DIFF WBC: CPT

## 2025-07-19 PROCEDURE — 99284 EMERGENCY DEPT VISIT MOD MDM: CPT | Mod: 25

## 2025-07-19 PROCEDURE — 87507 IADNA-DNA/RNA PROBE TQ 12-25: CPT

## 2025-07-19 PROCEDURE — 80053 COMPREHEN METABOLIC PANEL: CPT

## 2025-07-19 PROCEDURE — 84702 CHORIONIC GONADOTROPIN TEST: CPT

## 2025-07-19 PROCEDURE — 96374 THER/PROPH/DIAG INJ IV PUSH: CPT

## 2025-07-19 PROCEDURE — 76830 TRANSVAGINAL US NON-OB: CPT | Mod: 26

## 2025-07-19 PROCEDURE — 83735 ASSAY OF MAGNESIUM: CPT

## 2025-07-19 PROCEDURE — 99285 EMERGENCY DEPT VISIT HI MDM: CPT

## 2025-07-19 PROCEDURE — 36415 COLL VENOUS BLD VENIPUNCTURE: CPT

## 2025-07-19 PROCEDURE — 87045 FECES CULTURE AEROBIC BACT: CPT

## 2025-07-19 PROCEDURE — 76830 TRANSVAGINAL US NON-OB: CPT

## 2025-07-19 PROCEDURE — 96375 TX/PRO/DX INJ NEW DRUG ADDON: CPT

## 2025-07-19 PROCEDURE — 83605 ASSAY OF LACTIC ACID: CPT

## 2025-07-19 PROCEDURE — 83690 ASSAY OF LIPASE: CPT

## 2025-07-19 PROCEDURE — 81001 URINALYSIS AUTO W/SCOPE: CPT

## 2025-07-19 PROCEDURE — 87086 URINE CULTURE/COLONY COUNT: CPT

## 2025-07-19 RX ORDER — AMOXICILLIN AND CLAVULANATE POTASSIUM 500; 125 MG/1; MG/1
1 TABLET, FILM COATED ORAL
Qty: 10 | Refills: 0
Start: 2025-07-19 | End: 2025-07-23

## 2025-07-19 RX ORDER — METOCLOPRAMIDE HCL 10 MG
10 TABLET ORAL ONCE
Refills: 0 | Status: COMPLETED | OUTPATIENT
Start: 2025-07-19 | End: 2025-07-19

## 2025-07-19 RX ORDER — CEFTRIAXONE 500 MG/1
1000 INJECTION, POWDER, FOR SOLUTION INTRAMUSCULAR; INTRAVENOUS ONCE
Refills: 0 | Status: COMPLETED | OUTPATIENT
Start: 2025-07-19 | End: 2025-07-19

## 2025-07-19 RX ORDER — KETOROLAC TROMETHAMINE 30 MG/ML
15 INJECTION, SOLUTION INTRAMUSCULAR; INTRAVENOUS ONCE
Refills: 0 | Status: DISCONTINUED | OUTPATIENT
Start: 2025-07-19 | End: 2025-07-19

## 2025-07-19 RX ADMIN — KETOROLAC TROMETHAMINE 15 MILLIGRAM(S): 30 INJECTION, SOLUTION INTRAMUSCULAR; INTRAVENOUS at 14:44

## 2025-07-19 RX ADMIN — Medication 1000 MILLILITER(S): at 12:45

## 2025-07-19 RX ADMIN — Medication 1000 MILLILITER(S): at 11:44

## 2025-07-19 RX ADMIN — CEFTRIAXONE 100 MILLIGRAM(S): 500 INJECTION, POWDER, FOR SOLUTION INTRAMUSCULAR; INTRAVENOUS at 11:44

## 2025-07-19 RX ADMIN — Medication 10 MILLIGRAM(S): at 13:54

## 2025-07-19 NOTE — ED ADULT NURSE NOTE - STOOL PATTERN
Detail Level: Zone Plan: Non latex Compression stockings knee high with a zipper 20-30 mmHg wear daily \\nafter Washing wound apply levicyn cover with Sterile gauze and paper tape diarrhea

## 2025-07-19 NOTE — ED PROVIDER NOTE - AVIAN FLU SYMPTOMS
Department of Anesthesiology  Preprocedure Note       Name:  Joshua Cristina   Age:  37 y.o.  :  1986                                          MRN:  [id number]         Date:  2024      Surgeon: Surgeon(s):  Ena Fairchild MD    Procedure: Procedure(s):  HYSTEROSCOPY, DILATATION AND CURETTAGE, POLYPECTOMY    Medications prior to admission:   Prior to Admission medications    Medication Sig Start Date End Date Taking? Authorizing Provider   Phentermine-Topiramate (QSYMIA) 7.5-46 MG CP24 Take 1 capsule by mouth daily. Indications: migraine, wt loss Max Daily Amount: 1 capsule   Yes Brian Love MD   rizatriptan (MAXALT) 10 MG tablet Take 1 tablet by mouth once as needed for Migraine May repeat in 2 hours if needed   Yes Brian Love MD   Quercetin 250 MG TABS Take 1 tablet by mouth daily Indications: immune support   Yes Brian Love MD   ZINC-MAGNESIUM ASPART-VIT B6 PO Take 1 tablet by mouth daily Indications: immune support   Yes Brian Love MD   ELDERBERRY PO Take 100 mg by mouth daily   Yes Brian Love MD   vitamin D (VITAMIN D3) 250 MCG (93397 UT) CAPS capsule Take 1 capsule by mouth daily   Yes Brian Love MD   NONFORMULARY Take 1 tablet by mouth daily Excedrin Head Care   Yes Brian Love MD   fexofenadine (ALLEGRA ALLERGY) 180 MG tablet Take 1 tablet by mouth daily Indications: allergies   Yes Brian Love MD   Ascorbic Acid (VITAMIN C) 250 MG tablet Take 2 tablets by mouth daily   Yes Brian Love MD   Turmeric 500 MG TABS Take 1 tablet by mouth daily   Yes Brian Love MD   Ginger, Zingiber officinalis, 50 MG TABS Take 1 tablet by mouth daily   Yes Brian Love MD       Current medications:    Current Facility-Administered Medications   Medication Dose Route Frequency Provider Last Rate Last Admin    lactated ringers IV soln infusion   IntraVENous Continuous Ena Fairchild  mL/hr at  No

## 2025-07-19 NOTE — ED ADULT NURSE REASSESSMENT NOTE - NS ED NURSE REASSESS COMMENT FT1
pt feels better. pain better and actually has an appetite and wants to go home. pt re evaluated by md and to be d'c/d  iv d'c/d  no s/s infiltration upon removal pt discharged stable and ambulatory in nad at present d/c instruction reinforced and pt verbalized understanding vital signs as charted

## 2025-07-19 NOTE — ED ADULT NURSE NOTE - CAS TRG GEN SKIN CONDITION
Future/goal oriented/Highly motivated for treatment/Positive attitude
Alert and oriented to person, place and time/Awake
Warm/Dry

## 2025-07-19 NOTE — ED PROVIDER NOTE - CONSULTANT FREE TEXT FOR MDM DISCUSSED CASE WITH QUESTION
dr. garcia (GYN) - Ultrasound results reviewed. H&H stable from patient's baseline.  Patient does not have increased vaginal bleeding.  She can be safely discharged with outpatient gynecology follow-up.

## 2025-07-19 NOTE — ED PROVIDER NOTE - CLINICAL SUMMARY MEDICAL DECISION MAKING FREE TEXT BOX
40 year old F,  PPD approx 3 weeks, post op day 10 D&C for retained products of conception, presents to the ED for 4 days of fever (Tmax 101) and diarrhea. Describes diarrhea has watery, non-bloody and 10+ episodes every 12 hours. Also endorsing abdominal cramping. Admits to continued vaginal bleeding since D&C. Saw her gynecologist yesterday, who did basic blood work and was discharged. Last took Tylenol about 2 hours prior to arrival. Denies chest pain, shortness of breath, vomiting, dysuria, vaginal discharge.    Physical Exam:  Gen: NAD, awake and alert, non-toxic appearing  HEENT: Atraumatic, oropharynx clear, moist mucous membranes, normal conjunctiva  Cardio: RRR, no murmurs, rubs or gallops  Lung: CTAB, no respiratory distress, no wheezes/rhonchi/rales B/L  Abd: soft, Suprapubic tenderness palpation without rebound or guarding.  No CVA tenderness  MSK: no visible deformities, ROMx4   Neuro: No focal sensory or motor deficits  Skin: Warm, well perfused, no rash, no leg swelling     Patient presents with diarrhea, most consistent with viral gastroenteritis.  Patient also with suprapubic tenderness and continuos vaginal bleeding in setting of recent D&C, considering endometritis vs. continued retained products of conception. Vitals WNL, does not meet SIRS criteria at this time.  CBC, CMP, TVUS, UA, reassess  Will reassess the need for additional interventions as clinically warranted. Refer to any progress notes for updates on clinical course and as a continuation of this MDM.

## 2025-07-19 NOTE — ED ADULT TRIAGE NOTE - CHIEF COMPLAINT QUOTE
fever, diarrhea since Thursday, s/p normal vaginal delivery 6/25, had retained placenta/d&c 7/4 OBGYN [facility]

## 2025-07-19 NOTE — ED PROVIDER NOTE - PATIENT PORTAL LINK FT
You can access the FollowMyHealth Patient Portal offered by [facility] by registering at the following website: [email or web address]. By joining [email or web address] FollowMyHealth portal, you will also be able to view your health information using other applications (apps) compatible with our system.

## 2025-07-19 NOTE — ED ADULT NURSE NOTE - OBJECTIVE STATEMENT
diarrhea since thursday night with mucus, denies bloody.. denies n/v. c/o fever up to 101.9. c/o loss of appetite. s/p d and c /7/04 for retained products of conception post delivery. still c/o mild vaginal bleeding. not breastfeeding .saw gyn yesterday for same and had blood drawn and pending

## 2025-07-19 NOTE — ED PROVIDER NOTE - NSFOLLOWUPINSTRUCTIONS_ED_ALL_ED_FT
You were evaluated in the emergency department for diarrhea and fever.  Your blood work here was nonactionable.  Your ultrasound revealed complex fluid, likely blood.  We spoke to GYN here, who agrees it is safe for discharge.  This may be normal in the setting of recent D&C, please follow-up with your gynecologist as directed.    You have been diagnosed with a urinary tract infection. A dose of antibiotics was given to you in the emergency department.  Please take your next dose this evening.    We recommend you take 600mg ibuprofen every 6 hours or tylenol 650mg every 6 hours as needed for pain. If needed, you can alternate these medications so that you take one medication every 3 hours. For instance, at noon take ibuprofen, then at 3pm take tylenol, then at 6pm take ibuprofen.    It is important that you stay well-hydrated with drinks such as Gatorade, Pedialyte.    Return to the emergency department for increasing bleeding, dizziness, persistent fever over 100.4, or any other concerning symptoms.

## 2025-07-20 LAB
CULTURE RESULTS: SIGNIFICANT CHANGE UP
GI PCR PANEL: DETECTED
SALMONELLA DNA STL QL NAA+PROBE: DETECTED
SPECIMEN SOURCE: SIGNIFICANT CHANGE UP

## 2025-07-20 RX ORDER — ONDANSETRON HCL/PF 4 MG/2 ML
1 VIAL (ML) INJECTION
Refills: 0
Start: 2025-07-20

## 2025-07-20 RX ORDER — CIPROFLOXACIN HCL 250 MG
1 TABLET ORAL
Qty: 10 | Refills: 0
Start: 2025-07-20 | End: 2025-07-24

## 2025-07-20 RX ORDER — ONDANSETRON HCL/PF 4 MG/2 ML
1 VIAL (ML) INJECTION
Qty: 12 | Refills: 0
Start: 2025-07-20 | End: 2025-07-23

## 2025-07-21 NOTE — ED POST DISCHARGE NOTE - DETAILS
Discussed with patient regarding positive Salmonella.  The patient is on Cipro already, although has a negative urine culture.  The patient will finish her antibiotic, and will follow-up with primary care.  Discussed abdominal pain, diarrhea illness precaution instructions.

## 2025-07-24 LAB
CULTURE RESULTS: ABNORMAL
SPECIMEN SOURCE: SIGNIFICANT CHANGE UP

## 2025-07-31 LAB — CULTURE RESULTS: SIGNIFICANT CHANGE UP

## 2025-08-01 ENCOUNTER — APPOINTMENT (OUTPATIENT)
Dept: OBGYN | Facility: CLINIC | Age: 40
End: 2025-08-01
Payer: COMMERCIAL

## 2025-08-01 PROCEDURE — 0503F POSTPARTUM CARE VISIT: CPT

## 2025-09-18 ENCOUNTER — APPOINTMENT (OUTPATIENT)
Dept: MAMMOGRAPHY | Facility: CLINIC | Age: 40
End: 2025-09-18
Payer: COMMERCIAL

## 2025-09-18 PROCEDURE — 77063 BREAST TOMOSYNTHESIS BI: CPT

## 2025-09-18 PROCEDURE — 77067 SCR MAMMO BI INCL CAD: CPT

## 2025-09-23 PROBLEM — D48.5 NEOPLASM OF UNCERTAIN BEHAVIOR OF SCALP: Status: ACTIVE | Noted: 2025-09-23

## 2025-09-23 PROBLEM — D48.5 NEOPLASM OF UNCERTAIN BEHAVIOR OF SKIN: Status: ACTIVE | Noted: 2025-09-23

## 2025-09-23 PROBLEM — Z80.8 FAMILY HISTORY OF MALIGNANT NEOPLASM OF SKIN: Status: ACTIVE | Noted: 2025-09-23

## 2025-09-23 PROBLEM — L82.0 INFLAMED SEBORRHEIC KERATOSIS: Status: ACTIVE | Noted: 2025-09-23

## (undated) DEVICE — Device

## (undated) DEVICE — POSITIONER FOAM EGG CRATE ULNAR 2PCS (PINK)

## (undated) DEVICE — PREP BETADINE KIT

## (undated) DEVICE — DRAPE LIGHT HANDLE COVER (GREEN)

## (undated) DEVICE — MARKING PEN W RULER

## (undated) DEVICE — PACK LITHOTOMY

## (undated) DEVICE — DRSG TELFA 3 X 8

## (undated) DEVICE — DRAPE 1/2 SHEET 40X57"

## (undated) DEVICE — SOL IRR POUR NS 0.9% 500ML

## (undated) DEVICE — VACUUM CURETTE BERKLEY OLYMPUS CURVED 11MM

## (undated) DEVICE — SOL IRR BAG NS 0.9% 3000ML

## (undated) DEVICE — POSITIONER PATIENT SAFETY STRAP 3X60"

## (undated) DEVICE — WARMING BLANKET UPPER ADULT

## (undated) DEVICE — MYOSURE SCOPE SEAL